# Patient Record
Sex: MALE | Race: WHITE | NOT HISPANIC OR LATINO | Employment: OTHER | URBAN - METROPOLITAN AREA
[De-identification: names, ages, dates, MRNs, and addresses within clinical notes are randomized per-mention and may not be internally consistent; named-entity substitution may affect disease eponyms.]

---

## 2020-10-17 ENCOUNTER — APPOINTMENT (OUTPATIENT)
Dept: URGENT CARE | Facility: PHYSICIAN GROUP | Age: 65
End: 2020-10-17
Payer: COMMERCIAL

## 2020-11-19 ENCOUNTER — HOSPITAL ENCOUNTER (OUTPATIENT)
Dept: LAB | Facility: MEDICAL CENTER | Age: 65
End: 2020-11-19
Attending: FAMILY MEDICINE
Payer: COMMERCIAL

## 2020-11-19 PROCEDURE — U0003 INFECTIOUS AGENT DETECTION BY NUCLEIC ACID (DNA OR RNA); SEVERE ACUTE RESPIRATORY SYNDROME CORONAVIRUS 2 (SARS-COV-2) (CORONAVIRUS DISEASE [COVID-19]), AMPLIFIED PROBE TECHNIQUE, MAKING USE OF HIGH THROUGHPUT TECHNOLOGIES AS DESCRIBED BY CMS-2020-01-R: HCPCS

## 2020-11-19 PROCEDURE — C9803 HOPD COVID-19 SPEC COLLECT: HCPCS

## 2020-11-20 LAB — COVID ORDER STATUS COVID19: NORMAL

## 2020-11-21 LAB
SARS-COV-2 RNA RESP QL NAA+PROBE: NOTDETECTED
SPECIMEN SOURCE: NORMAL

## 2020-12-17 ENCOUNTER — OFFICE VISIT (OUTPATIENT)
Dept: URGENT CARE | Facility: PHYSICIAN GROUP | Age: 65
End: 2020-12-17
Payer: COMMERCIAL

## 2020-12-17 VITALS
HEART RATE: 102 BPM | SYSTOLIC BLOOD PRESSURE: 174 MMHG | WEIGHT: 260 LBS | TEMPERATURE: 97.7 F | DIASTOLIC BLOOD PRESSURE: 100 MMHG | BODY MASS INDEX: 38.51 KG/M2 | OXYGEN SATURATION: 96 % | RESPIRATION RATE: 20 BRPM | HEIGHT: 69 IN

## 2020-12-17 DIAGNOSIS — M54.2 ANTERIOR NECK PAIN: ICD-10-CM

## 2020-12-17 DIAGNOSIS — J35.1 SWOLLEN TONSIL: ICD-10-CM

## 2020-12-17 DIAGNOSIS — R03.0 ELEVATED BLOOD PRESSURE READING: ICD-10-CM

## 2020-12-17 LAB
HETEROPH AB SER QL LA: NEGATIVE
INT CON NEG: NEGATIVE
INT CON NEG: NEGATIVE
INT CON POS: POSITIVE
INT CON POS: POSITIVE
S PYO AG THROAT QL: NEGATIVE

## 2020-12-17 PROCEDURE — 86308 HETEROPHILE ANTIBODY SCREEN: CPT | Performed by: FAMILY MEDICINE

## 2020-12-17 PROCEDURE — 99203 OFFICE O/P NEW LOW 30 MIN: CPT | Performed by: FAMILY MEDICINE

## 2020-12-17 PROCEDURE — 87880 STREP A ASSAY W/OPTIC: CPT | Performed by: FAMILY MEDICINE

## 2020-12-17 RX ORDER — MULTIVITAMIN
CAPSULE ORAL DAILY
COMMUNITY
End: 2023-06-21

## 2020-12-17 RX ORDER — VITAMIN B COMPLEX
CAPSULE ORAL DAILY
COMMUNITY

## 2020-12-18 NOTE — PROGRESS NOTES
"Subjective:      Nando Nelson is a 65 y.o. male who presents with Pharyngitis (swollen tonsils,blisters back of throat, x 4 weeks)            This is a new problem.  65 present for evaluation of blisters on the tonsils and intermittent swelling on the tonsils for the past 4 weeks but also having trouble especially at night as if he cannot breathe.  He feels his anterior right neck is swollen.  He denies any trouble swallowing per se.  He denies any trouble breathing at the present time.  No fever or chills reported.  Review of system otherwise negative.      Review of Systems   All other systems reviewed and are negative.         Objective:     BP (!) 174/100 (BP Location: Left arm, Patient Position: Sitting, BP Cuff Size: Large adult)   Pulse (!) 102   Temp 36.5 °C (97.7 °F) (Temporal)   Resp 20   Ht 1.753 m (5' 9\")   Wt 117.9 kg (260 lb)   SpO2 96%   BMI 38.40 kg/m²      Physical Exam  Constitutional:       General: He is not in acute distress.     Appearance: He is not ill-appearing, toxic-appearing or diaphoretic.   HENT:      Head: Normocephalic and atraumatic.        Comments: Some palpation of the right side of neck in the outlined area.  No erythema or swelling noted     Right Ear: Tympanic membrane, ear canal and external ear normal.      Left Ear: Tympanic membrane, ear canal and external ear normal.      Nose: No rhinorrhea.      Mouth/Throat:      Mouth: Mucous membranes are moist.      Pharynx: No pharyngeal swelling, oropharyngeal exudate, posterior oropharyngeal erythema or uvula swelling.        Comments: Some smaller blisters noted on the right tonsil  Eyes:      Conjunctiva/sclera: Conjunctivae normal.   Neck:      Musculoskeletal: Neck supple.   Cardiovascular:      Rate and Rhythm: Normal rate and regular rhythm.      Heart sounds: No murmur. No friction rub. No gallop.    Pulmonary:      Effort: Pulmonary effort is normal. No respiratory distress.      Breath sounds: No stridor. No wheezing " or rales.   Lymphadenopathy:      Cervical: No cervical adenopathy.   Skin:     General: Skin is warm.      Coloration: Skin is not jaundiced or pale.      Findings: No rash.   Neurological:      Mental Status: He is alert and oriented to person, place, and time.   Psychiatric:         Mood and Affect: Mood normal.            Negative mono and strep         Assessment/Plan:        1. Anterior neck pain    2. Elevated blood pressure reading    3. Sore throat  - POCT Rapid Strep A  - POCT Mononucleosis (mono)      Discussed in length that we cannot rule out any infectious process just by looking at the oropharynx and given the anterior neck pain, also intermittent trouble with breathing at night and patient feels the neck is swollen at times even if it is past few weeks he should have this looked at immediately in the ED setting where he can have his kidney function tested before IV contrast and imaging that needs to be done given his specially elevated blood pressure today which has been to some degree the case for months but not this high.  Plan per orders and instructions  Warning signs reviewed

## 2020-12-18 NOTE — PATIENT INSTRUCTIONS
With your history of trouble with the neck at times , one worry about a process in the neck soft tissue pressing on airways. Your blood pressure is also high and you need to have kidney function done before imaging with contrast that you need    Please to to nearest emergency department of your choice for evaluation

## 2021-03-03 DIAGNOSIS — Z23 NEED FOR VACCINATION: ICD-10-CM

## 2021-07-08 ENCOUNTER — SLEEP CENTER VISIT (OUTPATIENT)
Dept: SLEEP MEDICINE | Facility: MEDICAL CENTER | Age: 66
End: 2021-07-08
Payer: MEDICARE

## 2021-07-08 VITALS
SYSTOLIC BLOOD PRESSURE: 160 MMHG | WEIGHT: 260 LBS | DIASTOLIC BLOOD PRESSURE: 94 MMHG | HEIGHT: 69 IN | RESPIRATION RATE: 16 BRPM | OXYGEN SATURATION: 98 % | BODY MASS INDEX: 38.51 KG/M2 | HEART RATE: 89 BPM

## 2021-07-08 DIAGNOSIS — G47.33 OSA (OBSTRUCTIVE SLEEP APNEA): ICD-10-CM

## 2021-07-08 DIAGNOSIS — I10 HYPERTENSION, UNSPECIFIED TYPE: ICD-10-CM

## 2021-07-08 PROCEDURE — 99203 OFFICE O/P NEW LOW 30 MIN: CPT | Performed by: PREVENTIVE MEDICINE

## 2021-07-08 RX ORDER — AZELASTINE HCL 205.5 UG/1
2 SPRAY NASAL
COMMUNITY
Start: 2021-05-10

## 2021-07-08 RX ORDER — ZOLPIDEM TARTRATE 5 MG/1
5 TABLET ORAL NIGHTLY PRN
Qty: 2 TABLET | Refills: 0 | Status: SHIPPED | OUTPATIENT
Start: 2021-07-08 | End: 2021-12-16

## 2021-07-08 NOTE — PROGRESS NOTES
"  CC: \" I need a new Pap machine.\"    HPI:  Nando Nelson is a 66 y.o. male kindly referred by Qasim BRUSH M.D..  This patient has been using Pap therapy for multiple years.  His machine is well over 5 years old and he is interested in getting a new machine that actually has a humidifier..  He is not using his PAP therapy at this time because it is irritating to his nasal passages and sinuses in this dry climate.  He is experiencing allergic rhinitis due to pollen and he also has a history of essential hypertension and GERD and SOB..        Symptom Summary:  Snoring: +  Very loud snoring: +  Witnessed apneas: +  Resuscitative snorts: +  Nocturnal shortness of breath: +  Non-restorative sleep: +  EPWORTH SCORE:    11 /24, this is consistent with excessive daytime sleepiness  Insomnia: +  Nocturnal awakenings: +  Nocturia: +  Dyspnea-ASV okay including APAP and ASV and ASV is used for patients that have central sleep apnea it has a whole different he has measured system match normal breaths and APAP is just a CPAP of 2 pressures and your body can admits to feeling that the mean of 88-year you are lying will basically like the machine will have much air within that range is necessary to keep the airway open so gets done it for obstructive sleep apnea pain that help get fatigue: +  Tiredness: +  Falls asleep accidentally: +   Napping or returning to bed after arising: +  Restless legs: -  Limb movements during sleep: -  Nocturnal headaches: -  Morning Headaches: -    Significant comorbidities and modifying factors include  GERD, HTN, hx of Bladder cancer      There are no problems to display for this patient.      Past Medical History:   Diagnosis Date   • Apnea, sleep     sometimes   • Daytime sleepiness    • Gasping for breath     sometimes   • Snoring     Occ        History reviewed. No pertinent surgical history.    Family History   Problem Relation Age of Onset   • Sleep Apnea Sister        Social History "     Socioeconomic History   • Marital status:      Spouse name: Not on file   • Number of children: Not on file   • Years of education: Not on file   • Highest education level: Not on file   Occupational History   • Not on file   Tobacco Use   • Smoking status: Never Smoker   • Smokeless tobacco: Never Used   Vaping Use   • Vaping Use: Never used   Substance and Sexual Activity   • Alcohol use: Yes     Comment: occ    • Drug use: Yes     Types: Marijuana     Comment: occ    • Sexual activity: Not on file   Other Topics Concern   • Not on file   Social History Narrative   • Not on file     Social Determinants of Health     Financial Resource Strain:    • Difficulty of Paying Living Expenses:    Food Insecurity:    • Worried About Running Out of Food in the Last Year:    • Ran Out of Food in the Last Year:    Transportation Needs:    • Lack of Transportation (Medical):    • Lack of Transportation (Non-Medical):    Physical Activity:    • Days of Exercise per Week:    • Minutes of Exercise per Session:    Stress:    • Feeling of Stress :    Social Connections:    • Frequency of Communication with Friends and Family:    • Frequency of Social Gatherings with Friends and Family:    • Attends Taoism Services:    • Active Member of Clubs or Organizations:    • Attends Club or Organization Meetings:    • Marital Status:    Intimate Partner Violence:    • Fear of Current or Ex-Partner:    • Emotionally Abused:    • Physically Abused:    • Sexually Abused:            ALLERGIES: Patient has no known allergies.    ROS    Constitutional: Denies weight loss, endorses fatigue.  Ears/Nose/Mouth/Throat: Endorses rhinitis/nasal congestion,  Cardiovascular: Denies chest pain, tightness, palpitations, swelling in legs/feet, difficulty breathing when lying down but gets better when sitting up.   Respiratory: Endorses shortness of breath while awake,  Sleep: per HPI  Gastrointestinal: Endorses heartburn.  Genitourinary: REPORTS  "nocturia  Musculoskeletal: Endorses back pain.   Neurological: Denies frequent headaches,weakness, dizziness.    PHYSICAL EXAM    Neck circumference:19.5  in  /94 (BP Location: Left arm, Patient Position: Sitting, BP Cuff Size: Large adult)   Pulse 89   Resp 16   Ht 1.753 m (5' 9\")   Wt 118 kg (260 lb)   SpO2 98%   BMI 38.40 kg/m²   Appearance: Well-nourished, well-developed,  looks stated age, no acute distress  Eyes:   EOMI  ENMT: MASKED  Neck: Supple, trachea midline  Respiratory effort:  No intercostal retractions or use of accessory muscles  Musculoskeletal:  Grossly normal; gait and station normal  Neurologic:  oriented to person, time, place, and purpose; judgement intact  Psychiatric:  No depression, anxiety, agitation        Medical Decision Making:  The medical record was reviewed in its as pertains to this referral. This includes records from primary care, consultants notes,  referral request, hospital records, labs, imaging.    Any available diagnostic and titration nocturnal polysomnograms, home sleep apnea tests, continuous nocturnal oximetry results, multiple sleep latency tests, and compliance reports were reviewed with the patient.    Assessment:    1. Hypertension, unspecified type  - Polysomnography, 4 or More; Future    2. RICHARD (obstructive sleep apnea)  - Polysomnography, 4 or More; Future  - zolpidem (AMBIEN) 5 MG Tab; Take 1 tablet by mouth at bedtime as needed for Sleep for up to 2 doses. Use only on the night of your sleep study.  Dispense: 2 tablet; Refill: 0    Other orders  - Azelastine HCl 0.15 % Solution; Administer 2 Sprays into each nostril.  - Fexofenadine HCl (ALLEGRA ALLERGY PO); Take  by mouth.  - Pseudoephedrine HCl (SUDAFED PO); Take  by mouth.      PLAN:   This patient was diagnosed with obstructive sleep apnea several years ago.  His machine is older and he does not have a humidification chamber.  We will schedule a nocturnal polysomnogram (split night).  Split at " greater than 5/h x 2 hours.  Patient will use Ambien.      The risks of untreated sleep apnea were discussed with the patient at length. Patients with RICHARD are at increased risk of cardiovascular disease including coronary artery disease, systemic arterial hypertension, pulmonary arterial hypertension, cardiac arrhythmias, and stroke. RICHARD patients have an increased risk of motor vehicle accidents, type 2 diabetes, chronic kidney disease, and non-alcoholic liver disease. The patient was advised to avoid driving a motor vehicle when drowsy.        Have advised the patient to follow up with the appropriate healthcare practitioners for all other medical problems and issues.              Return for PSG results MUST BE 5 week days after study, Discuss results of sleep study.

## 2021-07-12 ENCOUNTER — PATIENT MESSAGE (OUTPATIENT)
Dept: SLEEP MEDICINE | Facility: MEDICAL CENTER | Age: 66
End: 2021-07-12

## 2021-07-13 NOTE — TELEPHONE ENCOUNTER
From: Nando Nelson  To: Physician Magnolia Downey  Sent: 7/12/2021 11:18 AM PDT  Subject: Prescription Question    Hi Dr. Downye,  My pharmacy has not yet received the prescription for the sleep medication that I am to take for my sleep study on July 25th. Should I be concerned? or will that be sent to the pharmacy just prior to the 25th?  Thank you,  Nando Nelson

## 2021-07-25 ENCOUNTER — SLEEP STUDY (OUTPATIENT)
Dept: SLEEP MEDICINE | Facility: MEDICAL CENTER | Age: 66
End: 2021-07-25
Attending: PREVENTIVE MEDICINE
Payer: MEDICARE

## 2021-07-25 DIAGNOSIS — I10 HYPERTENSION, UNSPECIFIED TYPE: ICD-10-CM

## 2021-07-25 DIAGNOSIS — G47.33 OSA (OBSTRUCTIVE SLEEP APNEA): ICD-10-CM

## 2021-07-27 NOTE — PROCEDURES
RECORDING TECHNIQUE:   After the scalp was prepared, gold plated electrodes were applied to the scalp according to the International 10-20 System. EEG (electroencephalogram) was continuously monitored from the O1-M2, O2-M1, C3-M2, C4-M1, F3-M2, and F4-M1. EOGs (electrooculograms) were monitored by electrodes placed at the left and right outer canthi.  Chin EMG (electromyogram) was monitored by electrodes placed on the mentalis and sub-mentalis muscles.  Nasal and oral airflow were monitored using a triple port thermocouple as well as oronasal pressure transducer.  Respiratory effort was measured by inductive plethysmography technology employing abdominal and thoracic belts.  Blood oxygen saturation and pulse were monitored by pulse oximetry.  Heart rhythm was monitored by surface electrocardiogram.  Leg EMG was studied using surface electrodes placed on left and right anterior tibialis.  A microphone was used to monitor tracheal sounds and snoring.  Body position was monitored and documented by technician observation.    MONTAGE: Standard    STUDY TYPE: Split-night titration    SCORING CRITERIA:    A modification of the AASM manual for scoring of sleep and associated events was used.  Obstructive apneas were scored by cessation of airflow for at least 10 seconds with continuing respiratory effort.  Central apneas  were  scored by cessation of airflow for at least 10 seconds with no respiratory effort.  Hypopneas were scored by a 30% or more reduction in airflow for at least 10 seconds accompanied by arterial oxygen desaturation of 3%  or an arousal.  CMS, for Medicare patients , hypopneas are scored by 30% with mild reduction in airflow for at least 10 seconds accompanied by arterial saturation decreased at 4%.       Study data:    DIAGNOSTIC:  Study start time was 10:55:59 PM. Total recording time was 3h 0.0m (180 minutes) with a total sleep time of 2h 41.5m (161 minutes) resulting in a sleep efficiency of  89.72%.  Sleep latency from the start of the study was 06 minutes and REM latency from sleep onset was 61 minutes.  Respiratory:   There were 41 apneas in total consisting of 40 obstructive apneas, 0 mixed apneas, and 1 central apneas. There were 153 hypopneas in total.  The apnea index was 15.23 per hour and the hypopnea index was 56.84 per hour.  The overall AHI was 72.1, with a REM AHI of 82.50, and a supine AHI of 123.03.  Limb Movements:  There were a total of 81 periodic leg movements, of which 0 were PLMS arousals. This resulted in a PLMS index of 30.1 and a PLMS arousal index of 0.0  Oximetry:  The mean SaO2 was 87.0% for the diagnostic portion of the study, with a minimum SaO2 of 68.0%..  This patient had 139 minutes with oxygen saturation under 88 percent.    TREATMENT:    Treatment recording time was 4h 24.0m (264 minutes) with a total sleep time of 3h 49.0m (229 minutes) resulting in a sleep efficiency of 86.7%.   Sleep latency from the start of treatment was 14 minutes and REM latency from sleep onset was 1h 12.0m minutes.   The patient had 91 arousals in total for an arousal index of 23.8.  Respiratory:   There were 48 apneas in total consisting of 23 obstructive apneas, 25 central apneas, and 0 mixed apneas for an apnea index of 12.58.   The patient had 96 hypopneas in total, which resulted in a hypopnea index of 25.15.   The overall AHI was 37.73, with a REM AHI of 20.66, and a supine AHI of 47.93.   Limb Movements:  There were a total of 74 periodic leg movements, of which 0 were PLMS arousals. This resulted in a PLMS index of 19.4 and a PLMS arousal index of 0.0.  Oximetry:  The mean SaO2 during treatment was 90.0%, with a minimum oxygen saturation of 78.0%..  This patient spent 105.5 minutes under 89 percent oxygen saturation      TITRATION:CPAP was tried from 6cm H2O to 15 cm H2O. Bipap was tried from 16/12 cm H2O to 18/14 cm H2O.         This was a fully attended sleep study .   This test was  technically adequate.  CPAP was tried from 6cm H2O to 15 cm H2O. Bipap was tried from 16/12 cm H2O to 18/14 cm H2O. Although it appears that he did  best on 18/14 cmH2O with an AHI of 4.44 as the last pressure of the night-- he had been tries on this exact pressure just 30 mins earlier and he had an AHI of 86.67.  This titration was not  adequate.       ASSESSMENT:     DIAGNOSTIC :  Severe  obstructive sleep apnea hypopnea - AHI 72.1  Severe Nocturnal desaturation - jinny saturation 68% - saturations <89% below for 139 minutes of TST.    TREATMENT :  Severe obstructive sleep apnea hypopnea - AHI 37.73  Severe Nocturnal desaturation - jinny saturation 78.0% - saturations <89% below for 105.5 minutes of TST.        RECOMMENDATION:      This patient had a split night study on July 25, 2021.  The patient failed CPAP and then BiPAP was tried.  However the BiPAP titration was not adequate to identify the proper pressures for treatment.  It was during the BiPAP titration that he developed treatment emergent centrals.  This patient will be returned to the lab for a full night titration that will include BiPAP and possibly ASV.  This patient also had persistent severe nocturnal desaturation splint may indeed need oxygen supplementation unless ideal pressures of modality can be identified to treat his sleep apnea.                 HYPNOGRAM:

## 2021-08-13 ENCOUNTER — SLEEP CENTER VISIT (OUTPATIENT)
Dept: SLEEP MEDICINE | Facility: MEDICAL CENTER | Age: 66
End: 2021-08-13
Payer: MEDICARE

## 2021-08-13 VITALS
BODY MASS INDEX: 39.1 KG/M2 | RESPIRATION RATE: 16 BRPM | HEIGHT: 69 IN | OXYGEN SATURATION: 97 % | DIASTOLIC BLOOD PRESSURE: 80 MMHG | WEIGHT: 264 LBS | HEART RATE: 97 BPM | SYSTOLIC BLOOD PRESSURE: 150 MMHG

## 2021-08-13 DIAGNOSIS — G47.39 TREATMENT-EMERGENT CENTRAL SLEEP APNEA: ICD-10-CM

## 2021-08-13 DIAGNOSIS — G47.33 OSA (OBSTRUCTIVE SLEEP APNEA): ICD-10-CM

## 2021-08-13 DIAGNOSIS — G47.34 NOCTURNAL OXYGEN DESATURATION: ICD-10-CM

## 2021-08-13 PROCEDURE — 95811 POLYSOM 6/>YRS CPAP 4/> PARM: CPT | Performed by: PREVENTIVE MEDICINE

## 2021-08-13 PROCEDURE — 99213 OFFICE O/P EST LOW 20 MIN: CPT | Performed by: PREVENTIVE MEDICINE

## 2021-08-13 NOTE — PROGRESS NOTES
"CC: \" The sleep study was OK.\"        HPI:  Nando Nelson is a 66 y.o.male  kindly referred by Qasim BRUSH M.D. This patient has been using Pap therapy for multiple years.  His machine is well over 5 years old and he is interested in getting a new machine that actually has a humidifier..  He is not using his PAP therapy at this time because it is irritating to his nasal passages and sinuses in this dry climate.  He is experiencing allergic rhinitis due to pollen and he also has a history of essential hypertension and GERD and SOB..    SLEEP STUDY RESULTS:    This was a fully attended sleep study .   This test was technically adequate.  CPAP was tried from 6cm H2O to 15 cm H2O. Bipap was tried from 16/12 cm H2O to 18/14 cm H2O. Although it appears that he did  best on 18/14 cmH2O with an AHI of 4.44 as the last pressure of the night-- he had been tries on this exact pressure just 30 mins earlier and he had an AHI of 86.67.  This titration was not  adequate.       ASSESSMENT:     DIAGNOSTIC :  Severe  obstructive sleep apnea hypopnea - AHI 72.1  Severe Nocturnal desaturation - jinny saturation 68% - saturations <89% below for 139 minutes of TST.     TREATMENT :  Severe obstructive sleep apnea hypopnea - AHI 37.73  Severe Nocturnal desaturation - jinny saturation 78.0% - saturations <89% below for 105.5 minutes of TST.        There are no problems to display for this patient.      Past Medical History:   Diagnosis Date   • Apnea, sleep     sometimes   • Daytime sleepiness    • Gasping for breath     sometimes   • Snoring     Occ        History reviewed. No pertinent surgical history.    Family History   Problem Relation Age of Onset   • Sleep Apnea Sister        Social History     Socioeconomic History   • Marital status:      Spouse name: Not on file   • Number of children: Not on file   • Years of education: Not on file   • Highest education level: Not on file   Occupational History   • Not on file " "  Tobacco Use   • Smoking status: Never Smoker   • Smokeless tobacco: Never Used   Vaping Use   • Vaping Use: Never used   Substance and Sexual Activity   • Alcohol use: Yes     Comment: occ    • Drug use: Yes     Types: Marijuana     Comment: occ    • Sexual activity: Not on file   Other Topics Concern   • Not on file   Social History Narrative   • Not on file     Social Determinants of Health     Financial Resource Strain:    • Difficulty of Paying Living Expenses:    Food Insecurity:    • Worried About Running Out of Food in the Last Year:    • Ran Out of Food in the Last Year:    Transportation Needs:    • Lack of Transportation (Medical):    • Lack of Transportation (Non-Medical):    Physical Activity:    • Days of Exercise per Week:    • Minutes of Exercise per Session:    Stress:    • Feeling of Stress :    Social Connections:    • Frequency of Communication with Friends and Family:    • Frequency of Social Gatherings with Friends and Family:    • Attends Congregational Services:    • Active Member of Clubs or Organizations:    • Attends Club or Organization Meetings:    • Marital Status:    Intimate Partner Violence:    • Fear of Current or Ex-Partner:    • Emotionally Abused:    • Physically Abused:    • Sexually Abused:        Current Outpatient Medications   Medication Sig Dispense Refill   • Azelastine HCl 0.15 % Solution Administer 2 Sprays into each nostril.     • Fexofenadine HCl (ALLEGRA ALLERGY PO) Take  by mouth.     • Pseudoephedrine HCl (SUDAFED PO) Take  by mouth.     • Multiple Vitamin (MULTIVITAMIN) capsule Take  by mouth.     • B Complex Cap Take  by mouth.     • zolpidem (AMBIEN) 5 MG Tab Take 1 tablet by mouth at bedtime as needed for Sleep for up to 2 doses. Use only on the night of your sleep study. (Patient not taking: Reported on 8/13/2021) 2 tablet 0     No current facility-administered medications for this visit.    \"CURRENT RX\"    ALLERGIES: Patient has no known " "allergies.    ROS    Constitutional: Denies fever, chills, sweats,  weight loss, fatigue  Cardiovascular: Denies chest pain, tightness, palpitations, swelling in legs/feet, fainting, difficulty breathing when lying down but gets better when sitting up.   Respiratory: Denies shortness of breath, cough, sputum, wheezing, painful breathing, coughing up blood.   Sleep: per HPI  Gastrointestinal: Denies  difficulty swallowing, nausea, abdominal pain, diarrhea, constipation, heartburn.  Musculoskeletal: Denies painful joints, sore muscles, back pain.        PHYSICAL EXAM      /80 (BP Location: Left arm, Patient Position: Sitting, BP Cuff Size: Large adult)   Pulse 97   Resp 16   Ht 1.753 m (5' 9\")   Wt 120 kg (264 lb)   SpO2 97%   BMI 38.99 kg/m²   Appearance: Well-nourished, looks stated age no acute distress  Eyes:  , EOMI  ENMT: masked  Neck: , trachea midline, no masses  Respiratory effort:  No intercostal retractions or use of accessory muscles  Musculoskeletal:  Grossly normal; gait and station normal; digits and nails normal  Neurologic: oriented to person, time, place, and purpose; judgement intact  Psychiatric:  No depression, anxiety, agitation      Medical Decision Making       The medical record was reviewed in its entirety including the referral notes, records from primary care, consultants notes, hospital records, lab, imaging, microbiology, immunology, and immunizations.  Care gaps identified and reviewed with the patient.    Diagnostic and titration nocturnal polysomnogram's, home sleep apnea tests, continuous nocturnal oximetry results, multiple sleep latency tests, and compliance reports reviewed.    ASSESSMENT:    1. RICHARD (obstructive sleep apnea)  - Polysomnography Titration; Future    2. Nocturnal oxygen desaturation  - Polysomnography Titration; Future    3. Treatment-emergent central sleep apnea  - Polysomnography Titration; Future      PLAN:/ASSESSMENT:     DIAGNOSTIC :  Severe  " obstructive sleep apnea hypopnea - AHI 72.1  Severe Nocturnal desaturation - jinny saturation 68% - saturations <89% below for 139 minutes of TST.     TREATMENT :  Severe obstructive sleep apnea hypopnea - AHI 37.73  Severe Nocturnal desaturation - jinny saturation 78.0% - saturations <89% below for 105.5 minutes of TST.        RECOMMENDATION:      This patient had a split night study on July 25, 2021.  The patient failed CPAP and then BiPAP was tried.  However the BiPAP titration was not adequate to identify the proper pressures for treatment.  It was during the BiPAP titration that he developed treatment emergent centrals.  This patient will lreturn to the lab for a full night titration that will include BiPAP and possibly ASV.  This patient also had persistent severe nocturnal desaturations and  may indeed need oxygen supplementation unless ideal pressures of the proper modality can be identified to treat his sleep apnea.      The risks of untreated sleep apnea were discussed with the patient at length. Patients with RICHARD are at increased risk of cardiovascular disease including coronary artery disease, systemic arterial hypertension, pulmonary arterial hypertension, cardiac arrythmias, and stroke. RICHARD patients have an increased risk of motor vehicle accidents, type 2 diabetes, chronic kidney disease, and non-alcoholic liver disease. The patient was advised to avoid driving a motor vehicle when drowsy.    RTC:

## 2021-08-30 ENCOUNTER — HOSPITAL ENCOUNTER (OUTPATIENT)
Facility: MEDICAL CENTER | Age: 66
End: 2021-08-30
Attending: PHYSICIAN ASSISTANT
Payer: MEDICARE

## 2021-08-30 PROCEDURE — 87077 CULTURE AEROBIC IDENTIFY: CPT

## 2021-08-30 PROCEDURE — 87086 URINE CULTURE/COLONY COUNT: CPT

## 2021-08-30 PROCEDURE — 87186 SC STD MICRODIL/AGAR DIL: CPT

## 2021-09-02 LAB
BACTERIA UR CULT: ABNORMAL
BACTERIA UR CULT: ABNORMAL
SIGNIFICANT IND 70042: ABNORMAL
SITE SITE: ABNORMAL
SOURCE SOURCE: ABNORMAL

## 2021-09-08 ENCOUNTER — SLEEP STUDY (OUTPATIENT)
Dept: SLEEP MEDICINE | Facility: MEDICAL CENTER | Age: 66
End: 2021-09-08
Attending: PREVENTIVE MEDICINE
Payer: MEDICARE

## 2021-09-08 DIAGNOSIS — G47.34 NOCTURNAL OXYGEN DESATURATION: ICD-10-CM

## 2021-09-08 DIAGNOSIS — G47.33 OSA (OBSTRUCTIVE SLEEP APNEA): ICD-10-CM

## 2021-09-08 DIAGNOSIS — G47.39 TREATMENT-EMERGENT CENTRAL SLEEP APNEA: ICD-10-CM

## 2021-09-24 NOTE — PROCEDURES
MONTAGE: Standard    STUDY TYPE: Titration    RECORDING TECHNIQUE:   After the scalp was prepared, gold plated electrodes were applied to the scalp according to the International 10-20 System. EEG (electroencephalogram) was continuously monitored from the O1-M2, O2-M1, C3-M2, C4-M1, F3-M2, and F4-M1. EOGs (electrooculograms) were monitored by electrodes placed at the left and right outer canthi. Chin EMG (electromyogram) was monitored by electrodes placed on the mentalis and sub-mentalis muscles. Nasal and oral airflow were monitored using a triple port thermocouple as well as oronasal pressure transducer. Respiratory effort was measured by inductive plethysmography technology employing abdominal and thoracic belts. Blood oxygen saturation and pulse were monitored by pulse oximetry. Heart rhythm was monitored by surface electrocardiogram. Leg EMG was studied using surface electrodes placed on left and right anterior tibialis. A microphone was used to monitor tracheal sounds and snoring. Body position was monitored and documented by technician observation.   SCORING CRITERIA:   A modification of the AASM manual for scoring of sleep and associated events was used. Obstructive apneas were scored by cessation of airflow for at least 10 seconds with continuing respiratory effort. Central apneas were scored by cessation of airflow for at least 10 seconds with no respiratory effort. Hypopneas were scored by a 30% or more reduction in airflow for at least 10 seconds accompanied by arterial oxygen desaturation of 3% or an arousal. For CMS (Medicare) patients, per AASM rule 1B, hypopneas are scored by 30% with mild reduction in airflow for at least 10 seconds accompanied by arterial saturation decreased at 4%.      Study start time was 10:55:22 PM. Diagnostic recording time was 6h 36.0m with a total sleep time of 5h 23.5m resulting in a sleep efficiency of 81.69%%. Sleep latency from the start of the study was 05 minutes  and the latency from sleep to REM was 137 minutes. In total,74 arousals were scored for an arousal index of 13.7.    Respiratory:  There were a total of 6 apneas consisting of 1 obstructive apneas, 0 mixed apneas, and 5 central apneas. A total of 29 hypopneas were scored. The apnea index was 1.11 per hour and the hypopnea index was 5.38 per hour resulting in an overall AHI of 6.49. AHI during REM was 8.7 and AHI while supine was 6.46.    Oximetry:  There was a mean oxygen saturation of 91.0% with a minimum oxygen saturation of 78.0%. The patient spent 68.8 minutes of TST with SaO2 <88%.    Cardiac:  The highest heart rate seen while awake was 86 BPM while the highest heart rate during sleep was 83 BPM with an average sleeping heart rate of 71 BPM.    Limb Movements: Data are unreliable.    Titration: Starting at 10/6 cmH2O patient was titrated further up to 18/14 cmH2O    This was a fully attended sleep study. This test was technically adequate. This patient was titrated on BiPAP starting at 10/6 cm of water pressure. Patient was titrated up to 18/14 cm of water pressure. Patient did best at 17/13 cm of water pressure. Patient spent 85 minutes at that pressure and their AHI was 0.8 which is considered normal.    Assessment:     Mild obstructive Sleep Apnea Hypopnea - AHI 6.49.  Moderate to severe nocturnal desaturation - jinny saturation 78.0% - saturations <88% below for 68.8 minutes of TST.    Recommendation:     It is recommended this patient be provided a ResMed air curve 10 the auto BiPAP machine.  With heated humidification.  It will be set in spontaneous mode with IPAP set at 17 cmH2O and EPAP set at 13 cmH2O.  If the patient needs more range this Pap machine can be set as an auto BiPAP.  Oxygen supplementation at 2 L/min will also be ordered.  In the future once this patient is stable and compliant on his Pap therapy a repeat OPO. can be done using his PAP therapy only to determine whether or not his  supplemental oxygen is truly needed.        HYPNOGRAM:

## 2021-10-05 PROCEDURE — 95811 POLYSOM 6/>YRS CPAP 4/> PARM: CPT | Performed by: PREVENTIVE MEDICINE

## 2021-10-06 ENCOUNTER — OFFICE VISIT (OUTPATIENT)
Dept: SLEEP MEDICINE | Facility: MEDICAL CENTER | Age: 66
End: 2021-10-06
Payer: MEDICARE

## 2021-10-06 VITALS
DIASTOLIC BLOOD PRESSURE: 86 MMHG | HEART RATE: 97 BPM | SYSTOLIC BLOOD PRESSURE: 150 MMHG | WEIGHT: 260 LBS | BODY MASS INDEX: 38.51 KG/M2 | RESPIRATION RATE: 16 BRPM | OXYGEN SATURATION: 95 % | HEIGHT: 69 IN

## 2021-10-06 DIAGNOSIS — G47.34 NOCTURNAL OXYGEN DESATURATION: ICD-10-CM

## 2021-10-06 DIAGNOSIS — G47.33 OSA TREATED WITH BIPAP: ICD-10-CM

## 2021-10-06 PROCEDURE — 99214 OFFICE O/P EST MOD 30 MIN: CPT | Performed by: PREVENTIVE MEDICINE

## 2021-10-06 NOTE — PROGRESS NOTES
"CC: \" How did I do on my sleep test?\"        HPI:  Nando Nelson is a 66 y.o.male  kindly referred by Qasim BRUSH M.D. This patient has been using Pap therapy for multiple years.  His machine is well over 5 years old and he is interested in getting a new machine that actually has a humidifier..  He is not using his PAP therapy at this time because it is irritating to his nasal passages and sinuses in this dry climate.  He is experiencing allergic rhinitis due to pollen and he also has a history of essential hypertension and GERD and SOB.  He was last seen in this clinic by this provider on August 13, 2021.  At that time his first sleep study results were discussed see below:     SLEEP STUDY RESULTS:  7/25/2021     This was a fully attended sleep study .   This test was technically adequate.  CPAP was tried from 6cm H2O to 15 cm H2O. Bipap was tried from 16/12 cm H2O to 18/14 cm H2O. Although it appears that he did  best on 18/14 cmH2O with an AHI of 4.44 as the last pressure of the night-- he had been tries on this exact pressure just 30 mins earlier and he had an AHI of 86.67.  This titration was not  adequate.       ASSESSMENT:     DIAGNOSTIC :  Severe  obstructive sleep apnea hypopnea - AHI 72.1  Severe Nocturnal desaturation - jinny saturation 68% - saturations <89% below for 139 minutes of TST.     TREATMENT :  Severe obstructive sleep apnea hypopnea - AHI 37.73  Severe Nocturnal desaturation - jinny saturation 78.0% - saturations <89% below for 105.5 minutes of TST.  This was an inadequate titration.    Patient was then referred to a full night titration with BiPAP.  He underwent that study on September 8, 2021.  See results below    Sleep study results:  TYPE: Full night titration with BiPAP  DATE: September 8, 2021  TREATMENT AHI: 6.49  TREATMENT Oxygen Jinny: 78.0% and patient spent 68.8 minutes under an SaO2 of 88%  TITRATION: Patient did best at an IPAP of 17 and EPAP of 13 cm water pressure.  He was " that pressure for well over an hour and his AHI was 0.8.          There are no problems to display for this patient.      Past Medical History:   Diagnosis Date   • Apnea, sleep     sometimes   • Daytime sleepiness    • Gasping for breath     sometimes   • Snoring     Occ        History reviewed. No pertinent surgical history.    Family History   Problem Relation Age of Onset   • Sleep Apnea Sister        Social History     Socioeconomic History   • Marital status:      Spouse name: Not on file   • Number of children: Not on file   • Years of education: Not on file   • Highest education level: Not on file   Occupational History   • Not on file   Tobacco Use   • Smoking status: Never Smoker   • Smokeless tobacco: Never Used   Vaping Use   • Vaping Use: Never used   Substance and Sexual Activity   • Alcohol use: Yes     Comment: occ    • Drug use: Yes     Types: Marijuana     Comment: occ    • Sexual activity: Not on file   Other Topics Concern   • Not on file   Social History Narrative   • Not on file     Social Determinants of Health     Financial Resource Strain:    • Difficulty of Paying Living Expenses:    Food Insecurity:    • Worried About Running Out of Food in the Last Year:    • Ran Out of Food in the Last Year:    Transportation Needs:    • Lack of Transportation (Medical):    • Lack of Transportation (Non-Medical):    Physical Activity:    • Days of Exercise per Week:    • Minutes of Exercise per Session:    Stress:    • Feeling of Stress :    Social Connections:    • Frequency of Communication with Friends and Family:    • Frequency of Social Gatherings with Friends and Family:    • Attends Sabianism Services:    • Active Member of Clubs or Organizations:    • Attends Club or Organization Meetings:    • Marital Status:    Intimate Partner Violence:    • Fear of Current or Ex-Partner:    • Emotionally Abused:    • Physically Abused:    • Sexually Abused:        Current Outpatient Medications  "  Medication Sig Dispense Refill   • Azelastine HCl 0.15 % Solution Administer 2 Sprays into each nostril.     • Fexofenadine HCl (ALLEGRA ALLERGY PO) Take  by mouth.     • Pseudoephedrine HCl (SUDAFED PO) Take  by mouth.     • Multiple Vitamin (MULTIVITAMIN) capsule Take  by mouth.     • B Complex Cap Take  by mouth.     • zolpidem (AMBIEN) 5 MG Tab Take 1 tablet by mouth at bedtime as needed for Sleep for up to 2 doses. Use only on the night of your sleep study. (Patient not taking: Reported on 8/13/2021) 2 tablet 0     No current facility-administered medications for this visit.    \"CURRENT RX\"    ALLERGIES: Patient has no known allergies.    ROS    Constitutional: Denies fever, chills, sweats,  weight loss, fatigue  Cardiovascular: Denies chest pain, tightness, palpitations, swelling in legs/feet, fainting, difficulty breathing when lying down but gets better when sitting up.   Respiratory: Denies shortness of breath, cough, sputum, wheezing, painful breathing, coughing up blood.   Sleep: per HPI  Gastrointestinal: Denies  difficulty swallowing, nausea, abdominal pain, diarrhea, constipation, heartburn.  Musculoskeletal: Denies painful joints, sore muscles, back pain.        PHYSICAL EXAM      /86 (BP Location: Left arm, Patient Position: Sitting, BP Cuff Size: Large adult)   Pulse 97   Resp 16   Ht 1.753 m (5' 9\")   Wt 118 kg (260 lb)   SpO2 95%   BMI 38.40 kg/m²   Appearance: Well-nourished, looks stated age no acute distress  Eyes:  PERRLA, EOMI  ENMT: masked  Neck: trachea midline, no masses  Respiratory effort:  No intercostal retractions or use of accessory muscles  Musculoskeletal:  Grossly normal; gait and station normal; digits and nails normal  Neurologic: without focal signs; oriented to person, time, place, and purpose; judgement intact  Psychiatric:  No depression, anxiety, agitation      Medical Decision Making       The medical record was reviewed in its entirety including the referral " notes, records from primary care, consultants notes, hospital records, lab, imaging, microbiology, immunology, and immunizations.  Care gaps identified and reviewed with the patient.    Diagnostic and titration nocturnal polysomnogram's, home sleep apnea tests, continuous nocturnal oximetry results, multiple sleep latency tests, and compliance reports reviewed.    ASSESSMENT:    1. RICHARD treated with BiPAP  - DME BiPAP    2. Nocturnal oxygen desaturation  - DME BiPAP      PLAN:     Based on his most recent titration study following PAP therapy will be ordered:  ResMed air curve 10 the auto BiPAP machine with heated humidification.  Will be set in spontaneous mode with IPAP set at 17 cmH2O and EPAP set at 13 cmH2O.  If the patient needs more range this BiPAP machine can be set using pressure support.  Additionally oxygen supplementation at 2 L/min will be ordered as well.  In the future, once patient is stable and compliant on his Pap therapy a repeat OPO. will be done using his PAP therapy only.  This will be done to determine whether or not he continues to need his supplemental oxygen.      The risks of untreated sleep apnea were discussed with the patient at length. Patients with RICHARD are at increased risk of cardiovascular disease including coronary artery disease, systemic arterial hypertension, pulmonary arterial hypertension, cardiac arrythmias, and stroke. RICHARD patients have an increased risk of motor vehicle accidents, type 2 diabetes, chronic kidney disease, and non-alcoholic liver disease. The patient was advised to avoid driving a motor vehicle when drowsy.    RTC:    8 weeks for 1st compliance estiven

## 2021-12-14 ENCOUNTER — OFFICE VISIT (OUTPATIENT)
Dept: SLEEP MEDICINE | Facility: MEDICAL CENTER | Age: 66
End: 2021-12-14
Payer: MEDICARE

## 2021-12-14 VITALS
BODY MASS INDEX: 39.1 KG/M2 | HEART RATE: 95 BPM | WEIGHT: 264 LBS | RESPIRATION RATE: 16 BRPM | OXYGEN SATURATION: 96 % | HEIGHT: 69 IN | DIASTOLIC BLOOD PRESSURE: 90 MMHG | SYSTOLIC BLOOD PRESSURE: 160 MMHG

## 2021-12-14 DIAGNOSIS — G47.33 OSA (OBSTRUCTIVE SLEEP APNEA): ICD-10-CM

## 2021-12-14 DIAGNOSIS — G47.34 NOCTURNAL OXYGEN DESATURATION: ICD-10-CM

## 2021-12-14 PROCEDURE — 99214 OFFICE O/P EST MOD 30 MIN: CPT | Performed by: PREVENTIVE MEDICINE

## 2021-12-14 NOTE — PROGRESS NOTES
"CC: \" Overall it is going well but once in a while I get a pressure coming through the mask just feels too high.\"        HPI:  Nando Nelson is a 66 y.o.male  kindly referred by Qasim BRUSH M.D. Patient reports that he can see the benefit of PAP therapy..  He reports that he is sleeping more deeply and is less tired during the day.    COMPLIANCE DATA:  Machine type: ResMed air curve 10 V auto  Date range: 11/14/2021 through 12/13/2021  AHI: 5.3  TIME USED: 7 hours 46 minutes   PRESSURE SETTINGS: Max IPAP 17 cm water min EPAP 13 cmH2O  LEAK: Intermittent  OTHER: The air pressure will climb if patient has severe mask leak    SLEEP STUDY RESULTS:  7/25/2021     This was a fully attended sleep study .   This test was technically adequate.  CPAP was tried from 6cm H2O to 15 cm H2O. Bipap was tried from 16/12 cm H2O to 18/14 cm H2O. Although it appears that he did  best on 18/14 cmH2O with an AHI of 4.44 as the last pressure of the night-- he had been tries on this exact pressure just 30 mins earlier and he had an AHI of 86.67.  This titration was not  adequate.       ASSESSMENT:     DIAGNOSTIC :  Severe  obstructive sleep apnea hypopnea - AHI 72.1  Severe Nocturnal desaturation - jinny saturation 68% - saturations <89% below for 139 minutes of TST.     TREATMENT :  Severe obstructive sleep apnea hypopnea - AHI 37.73  Severe Nocturnal desaturation - jinny saturation 78.0% - saturations <89% below for 105.5 minutes of TST.  This was an inadequate titration.     Patient was then referred to a full night titration with BiPAP.  He underwent that study on September 8, 2021.  See results below     Sleep study results:  TYPE: Full night titration with BiPAP  DATE: September 8, 2021  TREATMENT AHI: 6.49  TREATMENT Oxygen Jinny: 78.0% and patient spent 68.8 minutes under an SaO2 of 88%  TITRATION: Patient did best at an IPAP of 17 and EPAP of 13 cm water pressure.  He was that pressure for well over an hour and his AHI was " 0.8.    This patient was started on bilevel 17/13 as well as 2 L/min of oxygen due to his September 8, 2021 titration study significant desaturations.    Past Medical History:   Diagnosis Date   • Apnea, sleep     sometimes   • Daytime sleepiness    • Gasping for breath     sometimes   • Snoring     Occ        History reviewed. No pertinent surgical history.    Family History   Problem Relation Age of Onset   • Sleep Apnea Sister        Social History     Socioeconomic History   • Marital status:      Spouse name: Not on file   • Number of children: Not on file   • Years of education: Not on file   • Highest education level: Not on file   Occupational History   • Not on file   Tobacco Use   • Smoking status: Never Smoker   • Smokeless tobacco: Never Used   Vaping Use   • Vaping Use: Never used   Substance and Sexual Activity   • Alcohol use: Yes     Comment: occ    • Drug use: Yes     Types: Marijuana     Comment: occ    • Sexual activity: Not on file   Other Topics Concern   • Not on file   Social History Narrative   • Not on file     Social Determinants of Health     Financial Resource Strain:    • Difficulty of Paying Living Expenses: Not on file   Food Insecurity:    • Worried About Running Out of Food in the Last Year: Not on file   • Ran Out of Food in the Last Year: Not on file   Transportation Needs:    • Lack of Transportation (Medical): Not on file   • Lack of Transportation (Non-Medical): Not on file   Physical Activity:    • Days of Exercise per Week: Not on file   • Minutes of Exercise per Session: Not on file   Stress:    • Feeling of Stress : Not on file   Social Connections:    • Frequency of Communication with Friends and Family: Not on file   • Frequency of Social Gatherings with Friends and Family: Not on file   • Attends Druze Services: Not on file   • Active Member of Clubs or Organizations: Not on file   • Attends Club or Organization Meetings: Not on file   • Marital Status: Not on  "file   Intimate Partner Violence:    • Fear of Current or Ex-Partner: Not on file   • Emotionally Abused: Not on file   • Physically Abused: Not on file   • Sexually Abused: Not on file   Housing Stability:    • Unable to Pay for Housing in the Last Year: Not on file   • Number of Places Lived in the Last Year: Not on file   • Unstable Housing in the Last Year: Not on file       Current Outpatient Medications   Medication Sig Dispense Refill   • Azelastine HCl 0.15 % Solution Administer 2 Sprays into each nostril.     • Fexofenadine HCl (ALLEGRA ALLERGY PO) Take  by mouth.     • Pseudoephedrine HCl (SUDAFED PO) Take  by mouth.     • Multiple Vitamin (MULTIVITAMIN) capsule Take  by mouth.     • B Complex Cap Take  by mouth.     • zolpidem (AMBIEN) 5 MG Tab Take 1 tablet by mouth at bedtime as needed for Sleep for up to 2 doses. Use only on the night of your sleep study. (Patient not taking: Reported on 8/13/2021) 2 tablet 0     No current facility-administered medications for this visit.    \"CURRENT RX\"    ALLERGIES: Patient has no known allergies.    ROS    Constitutional: Denies  weight loss, less fatigue  Cardiovascular: Denies chest pain, tightness, palpitations, swelling in legs/feet, difficulty breathing when lying down but gets better when sitting up.   Respiratory: Denies shortness of breath during the day  Sleep: per HPI  Gastrointestinal: Denies  difficulty swallowing, heartburn.  Musculoskeletal: Denies painful joints, sore muscles, back pain.        PHYSICAL EXAM      /90 (BP Location: Left arm, Patient Position: Sitting, BP Cuff Size: Large adult)   Pulse 95   Resp 16   Ht 1.753 m (5' 9\")   Wt 120 kg (264 lb)   SpO2 96%   BMI 38.99 kg/m²   Appearance: Well-nourished, looks stated age, no acute distress  Eyes:   EOMI  ENMT: masked  Neck: Supple, trachea midline, no masses  Respiratory effort:  No intercostal retractions or use of accessory muscles  Lung auscultation:  No wheezes rhonchi rubs " or rales  Cardiac: No murmurs, rubs, or gallops; regular rhythm, normal rate; no edema  Musculoskeletal:  Grossly normal; gait and station normal; digits and nails normal  Skin:  No cyanosis  Neurologic: oriented to person, time, place, and purpose; judgement intact  Psychiatric:  No depression, anxiety, agitation      Medical Decision Making       The medical record was reviewed in its entirety including the referral notes, records from primary care, consultants notes, hospital records, lab, imaging, microbiology, immunology, and immunizations.  Care gaps identified and reviewed with the patient.    Diagnostic and titration nocturnal polysomnogram's, home sleep apnea tests, continuous nocturnal oximetry results, multiple sleep latency tests, and compliance reports reviewed.    ASSESSMENT/PLAN:    1. RICHARD (obstructive sleep apnea)    - Overnight Oximetry; Future: This oximetry is being done to determine whether or not this patient actually requires oxygen supplementation.  He was under 88% oxygen saturation for at least 60 minutes on his titration study which would normally call for oxygen supplementation.  However once his PAP therapy is stabilized he may not need oxygen supplementation.  - DME Mask and Supplies    2. Nocturnal oxygen desaturation    - Overnight Oximetry; Future  - DME Mask and Supplies    Has been advised to continue the current Pap Therapy.  Also advised to clean equipment frequently, and get new mask and supplies as allowed by insurance and DME.  Patient was advised to NEVER use  OZONE containing cleaning systems such as So Clean.    The risks of untreated sleep apnea were discussed with the patient at length. Patients with RICHARD are at increased risk of cardiovascular disease including coronary artery disease, systemic arterial hypertension, pulmonary arterial hypertension, cardiac arrhythmias, and stroke. RICHARD patients have an increased risk of motor vehicle accidents, type 2 diabetes, chronic  kidney disease, and non-alcoholic liver disease. The patient was advised to avoid driving a motor vehicle when drowsy.      Have advised the patient to follow up with the appropriate healthcare practitioners for all other medical problems and issues.      RTC:  3 months for results of OPO.

## 2022-01-28 ENCOUNTER — HOME STUDY (OUTPATIENT)
Dept: SLEEP MEDICINE | Facility: MEDICAL CENTER | Age: 67
End: 2022-01-28
Attending: PREVENTIVE MEDICINE
Payer: MEDICARE

## 2022-01-28 DIAGNOSIS — G47.33 OSA (OBSTRUCTIVE SLEEP APNEA): ICD-10-CM

## 2022-01-28 DIAGNOSIS — G47.34 NOCTURNAL OXYGEN DESATURATION: ICD-10-CM

## 2022-01-28 PROCEDURE — 94762 N-INVAS EAR/PLS OXIMTRY CONT: CPT | Performed by: PREVENTIVE MEDICINE

## 2022-02-01 NOTE — PROCEDURES
OXIMETRY while using PAP Therapy, NO supplemental oxygen.    Interpretation:     This overnight oximetry was recorded on January 28, 2022 with the patient on Pap therapy.  The analysis duration was 8 hours and 1 minute.  161 total oximetric events occurred encompassing 87.3 minutes .  The average event duration was 32.5 seconds .  The saturations were less than 90% for 13.4% of the recording.  The jinny saturation was 73% .  The patient spent 23.3 minutes with saturations < 88%.  Overall, this continuous nocturnal oximetry demonstrates the need for ongoing supplemental oxygen at a minimum of 2 L/min while on positive airway pressure therapy.      Recommendation: Continue oxygen supplementation at 2 L/min bleed in with Pap therapy.  Also the compliance was reviewed on this patient and a pressure support was added of 2 cmH2O.    Clinical correlation is needed.

## 2022-05-02 ENCOUNTER — APPOINTMENT (RX ONLY)
Dept: URBAN - METROPOLITAN AREA CLINIC 6 | Facility: CLINIC | Age: 67
Setting detail: DERMATOLOGY
End: 2022-05-02

## 2022-05-02 DIAGNOSIS — D492 NEOPLASM OF UNSPECIFIED NATURE OF BONE, SOFT TISSUE, AND SKIN: ICD-10-CM

## 2022-05-02 DIAGNOSIS — L82.0 INFLAMED SEBORRHEIC KERATOSIS: ICD-10-CM

## 2022-05-02 PROBLEM — R22.1 LOCALIZED SWELLING, MASS AND LUMP, NECK: Status: ACTIVE | Noted: 2022-05-02

## 2022-05-02 PROCEDURE — 99202 OFFICE O/P NEW SF 15 MIN: CPT

## 2022-05-02 PROCEDURE — ? ADDITIONAL NOTES

## 2022-05-02 PROCEDURE — ? ORDER ULTRASOUND

## 2022-05-02 PROCEDURE — ? COUNSELING

## 2022-05-02 PROCEDURE — ? LIQUID NITROGEN (COSMETIC)

## 2022-05-02 ASSESSMENT — LOCATION SIMPLE DESCRIPTION DERM
LOCATION SIMPLE: POSTERIOR NECK
LOCATION SIMPLE: LEFT CHEEK

## 2022-05-02 ASSESSMENT — LOCATION DETAILED DESCRIPTION DERM
LOCATION DETAILED: LEFT LATERAL MALAR CHEEK
LOCATION DETAILED: RIGHT LATERAL TRAPEZIAL NECK

## 2022-05-02 ASSESSMENT — LOCATION ZONE DERM
LOCATION ZONE: FACE
LOCATION ZONE: NECK

## 2022-05-02 NOTE — PROCEDURE: ADDITIONAL NOTES
Detail Level: Simple
Render Risk Assessment In Note?: no
Additional Notes: 6 x 4.5 cm. Patient reports the mass has grown in size over the past few years and has drained fluid in the past, but not for a long time. Patient would like the mass removed.

## 2022-05-02 NOTE — PROCEDURE: ORDER ULTRASOUND
Detail Level: Simple
Provider: Rajwinder Mitchell
Ultrasound Protocol: Ultrasound of Subcutaneous Mass
Priority: normal

## 2022-06-01 ENCOUNTER — APPOINTMENT (RX ONLY)
Dept: URBAN - METROPOLITAN AREA CLINIC 6 | Facility: CLINIC | Age: 67
Setting detail: DERMATOLOGY
End: 2022-06-01

## 2022-06-01 DIAGNOSIS — L72.0 EPIDERMAL CYST: ICD-10-CM

## 2022-06-01 PROCEDURE — 12042 INTMD RPR N-HF/GENIT2.6-7.5: CPT

## 2022-06-01 PROCEDURE — ? EXCISION

## 2022-06-01 PROCEDURE — 11426 EXC H-F-NK-SP B9+MARG >4 CM: CPT

## 2022-06-01 ASSESSMENT — LOCATION ZONE DERM: LOCATION ZONE: NECK

## 2022-06-01 ASSESSMENT — LOCATION DETAILED DESCRIPTION DERM: LOCATION DETAILED: RIGHT LATERAL TRAPEZIAL NECK

## 2022-06-01 ASSESSMENT — LOCATION SIMPLE DESCRIPTION DERM: LOCATION SIMPLE: POSTERIOR NECK

## 2022-06-01 NOTE — PROCEDURE: EXCISION
Medical Necessity Information: It is in your best interest to select a reason for this procedure from the list below. All of these items fulfill various CMS LCD requirements except lesion extends to a margin.
Include Z78.9 (Other Specified Conditions Influencing Health Status) As An Associated Diagnosis?: No
Medical Necessity Clause: This procedure was medically necessary because the lesion that was treated was:
Lab: [id number]
Lab Facility: 
Surgeon (Optional): Dr. Chuy Orosco
Biopsy Photograph Reviewed: Yes
Size Of Lesion In Cm: 5.5
X Size Of Lesion In Cm (Optional): 0
Size Of Margin In Cm: 0.1
Anesthesia Volume In Cc: 12
Eye Clamp Note Details: An eye clamp was used during the procedure.
Excision Method: Fusiform
Saucerization Depth: dermis and superficial adipose tissue
Repair Type: Intermediate
Suturegard Retention Suture: 2-0 Nylon
Retention Suture Bite Size: 3 mm
Length To Time In Minutes Device Was In Place: 10
Number Of Hemigard Strips Per Side: 1
Intermediate / Complex Repair - Final Wound Length In Cm: 6.8
Undermining Type: Entire Wound
Debridement Text: The wound edges were debrided prior to proceeding with the closure to facilitate wound healing.
Helical Rim Text: The closure involved the helical rim.
Vermilion Border Text: The closure involved the vermilion border.
Nostril Rim Text: The closure involved the nostril rim.
Retention Suture Text: Retention sutures were placed to support the closure and prevent dehiscence.
Suture Removal: 14 days
Epidermal Closure Graft Donor Site (Optional): simple interrupted
Graft Donor Site Bandage (Optional-Leave Blank If You Don't Want In Note): Steri-strips and a pressure bandage were applied to the donor site.
Detail Level: Detailed
Excision Depth: muscle
Scalpel Size: 15 blade
Anesthesia Type: 1% lidocaine with epinephrine
Additional Anesthesia Volume In Cc: 6
Hemostasis: Electrocautery
Estimated Blood Loss (Cc): minimal
Deep Sutures: 4-0 Monocryl
Epidermal Sutures: 4-0 Surgipro
Wound Care: Petrolatum
Dressing: pressure dressing with telfa
Suturegard Intro: Intraoperative tissue expansion was performed, utilizing the SUTUREGARD device, in order to reduce wound tension.
Suturegard Body: The suture ends were repeatedly re-tightened and re-clamped to achieve the desired tissue expansion.
Hemigard Intro: Due to skin fragility and wound tension, it was decided to use HEMIGARD adhesive retention suture devices to permit a linear closure. The skin was cleaned and dried for a 6cm distance away from the wound. Excessive hair, if present, was removed to allow for adhesion.
Hemigard Postcare Instructions: The HEMIGARD strips are to remain completely dry for at least 5-7 days.
Positioning (Leave Blank If You Do Not Want): The patient was placed in a comfortable position exposing the surgical site.
Complex Repair Preamble Text (Leave Blank If You Do Not Want): Extensive wide undermining was performed.
Intermediate Repair Preamble Text (Leave Blank If You Do Not Want): Undermining was performed with blunt dissection.
Fusiform Excision Additional Text (Leave Blank If You Do Not Want): The margin was drawn around the clinically apparent lesion.  A fusiform shape was then drawn on the skin incorporating the lesion and margins.  Incisions were then made along these lines to the appropriate tissue plane and the lesion was extirpated.
Eliptical Excision Additional Text (Leave Blank If You Do Not Want): The margin was drawn around the clinically apparent lesion.  An elliptical shape was then drawn on the skin incorporating the lesion and margins.  Incisions were then made along these lines to the appropriate tissue plane and the lesion was extirpated.
Saucerization Excision Additional Text (Leave Blank If You Do Not Want): The margin was drawn around the clinically apparent lesion.  Incisions were then made along these lines, in a tangential fashion, to the appropriate tissue plane and the lesion was extirpated.
Slit Excision Additional Text (Leave Blank If You Do Not Want): A linear line was drawn on the skin overlying the lesion. An incision was made slowly until the lesion was visualized.  Once visualized, the lesion was removed with blunt dissection.
Excisional Biopsy Additional Text (Leave Blank If You Do Not Want): The margin was drawn around the clinically apparent lesion. An elliptical shape was then drawn on the skin incorporating the lesion and margins.  Incisions were then made along these lines to the appropriate tissue plane and the lesion was extirpated.
Perilesional Excision Additional Text (Leave Blank If You Do Not Want): The margin was drawn around the clinically apparent lesion. Incisions were then made along these lines to the appropriate tissue plane and the lesion was extirpated.
Repair Performed By Another Provider Text (Leave Blank If You Do Not Want): After the tissue was excised the defect was repaired by another provider.
No Repair - Repaired With Adjacent Surgical Defect Text (Leave Blank If You Do Not Want): After the excision the defect was repaired concurrently with another surgical defect which was in close approximation.
Adjacent Tissue Transfer Text: The defect edges were debeveled with a #15 scalpel blade.  Given the location of the defect and the proximity to free margins an adjacent tissue transfer was deemed most appropriate.  Using a sterile surgical marker, an appropriate flap was drawn incorporating the defect and placing the expected incisions within the relaxed skin tension lines where possible.    The area thus outlined was incised deep to adipose tissue with a #15 scalpel blade.  The skin margins were undermined to an appropriate distance in all directions utilizing iris scissors.
Advancement Flap (Single) Text: The defect edges were debeveled with a #15 scalpel blade.  Given the location of the defect and the proximity to free margins a single advancement flap was deemed most appropriate.  Using a sterile surgical marker, an appropriate advancement flap was drawn incorporating the defect and placing the expected incisions within the relaxed skin tension lines where possible.    The area thus outlined was incised deep to adipose tissue with a #15 scalpel blade.  The skin margins were undermined to an appropriate distance in all directions utilizing iris scissors.
Advancement Flap (Double) Text: The defect edges were debeveled with a #15 scalpel blade.  Given the location of the defect and the proximity to free margins a double advancement flap was deemed most appropriate.  Using a sterile surgical marker, the appropriate advancement flaps were drawn incorporating the defect and placing the expected incisions within the relaxed skin tension lines where possible.    The area thus outlined was incised deep to adipose tissue with a #15 scalpel blade.  The skin margins were undermined to an appropriate distance in all directions utilizing iris scissors.
Burow's Advancement Flap Text: The defect edges were debeveled with a #15 scalpel blade.  Given the location of the defect and the proximity to free margins a Burow's advancement flap was deemed most appropriate.  Using a sterile surgical marker, the appropriate advancement flap was drawn incorporating the defect and placing the expected incisions within the relaxed skin tension lines where possible.    The area thus outlined was incised deep to adipose tissue with a #15 scalpel blade.  The skin margins were undermined to an appropriate distance in all directions utilizing iris scissors.
Chonodrocutaneous Helical Advancement Flap Text: The defect edges were debeveled with a #15 scalpel blade.  Given the location of the defect and the proximity to free margins a chondrocutaneous helical advancement flap was deemed most appropriate.  Using a sterile surgical marker, the appropriate advancement flap was drawn incorporating the defect and placing the expected incisions within the relaxed skin tension lines where possible.    The area thus outlined was incised deep to adipose tissue with a #15 scalpel blade.  The skin margins were undermined to an appropriate distance in all directions utilizing iris scissors.
Crescentic Advancement Flap Text: The defect edges were debeveled with a #15 scalpel blade.  Given the location of the defect and the proximity to free margins a crescentic advancement flap was deemed most appropriate.  Using a sterile surgical marker, the appropriate advancement flap was drawn incorporating the defect and placing the expected incisions within the relaxed skin tension lines where possible.    The area thus outlined was incised deep to adipose tissue with a #15 scalpel blade.  The skin margins were undermined to an appropriate distance in all directions utilizing iris scissors.
A-T Advancement Flap Text: The defect edges were debeveled with a #15 scalpel blade.  Given the location of the defect, shape of the defect and the proximity to free margins an A-T advancement flap was deemed most appropriate.  Using a sterile surgical marker, an appropriate advancement flap was drawn incorporating the defect and placing the expected incisions within the relaxed skin tension lines where possible.    The area thus outlined was incised deep to adipose tissue with a #15 scalpel blade.  The skin margins were undermined to an appropriate distance in all directions utilizing iris scissors.
O-T Advancement Flap Text: The defect edges were debeveled with a #15 scalpel blade.  Given the location of the defect, shape of the defect and the proximity to free margins an O-T advancement flap was deemed most appropriate.  Using a sterile surgical marker, an appropriate advancement flap was drawn incorporating the defect and placing the expected incisions within the relaxed skin tension lines where possible.    The area thus outlined was incised deep to adipose tissue with a #15 scalpel blade.  The skin margins were undermined to an appropriate distance in all directions utilizing iris scissors.
O-L Flap Text: The defect edges were debeveled with a #15 scalpel blade.  Given the location of the defect, shape of the defect and the proximity to free margins an O-L flap was deemed most appropriate.  Using a sterile surgical marker, an appropriate advancement flap was drawn incorporating the defect and placing the expected incisions within the relaxed skin tension lines where possible.    The area thus outlined was incised deep to adipose tissue with a #15 scalpel blade.  The skin margins were undermined to an appropriate distance in all directions utilizing iris scissors.
O-Z Flap Text: The defect edges were debeveled with a #15 scalpel blade.  Given the location of the defect, shape of the defect and the proximity to free margins an O-Z flap was deemed most appropriate.  Using a sterile surgical marker, an appropriate transposition flap was drawn incorporating the defect and placing the expected incisions within the relaxed skin tension lines where possible. The area thus outlined was incised deep to adipose tissue with a #15 scalpel blade.  The skin margins were undermined to an appropriate distance in all directions utilizing iris scissors.
Double O-Z Flap Text: The defect edges were debeveled with a #15 scalpel blade.  Given the location of the defect, shape of the defect and the proximity to free margins a Double O-Z flap was deemed most appropriate.  Using a sterile surgical marker, an appropriate transposition flap was drawn incorporating the defect and placing the expected incisions within the relaxed skin tension lines where possible. The area thus outlined was incised deep to adipose tissue with a #15 scalpel blade.  The skin margins were undermined to an appropriate distance in all directions utilizing iris scissors.
V-Y Flap Text: The defect edges were debeveled with a #15 scalpel blade.  Given the location of the defect, shape of the defect and the proximity to free margins a V-Y flap was deemed most appropriate.  Using a sterile surgical marker, an appropriate advancement flap was drawn incorporating the defect and placing the expected incisions within the relaxed skin tension lines where possible.    The area thus outlined was incised deep to adipose tissue with a #15 scalpel blade.  The skin margins were undermined to an appropriate distance in all directions utilizing iris scissors.
Advancement-Rotation Flap Text: The defect edges were debeveled with a #15 scalpel blade.  Given the location of the defect, shape of the defect and the proximity to free margins an advancement-rotation flap was deemed most appropriate.  Using a sterile surgical marker, an appropriate flap was drawn incorporating the defect and placing the expected incisions within the relaxed skin tension lines where possible. The area thus outlined was incised deep to adipose tissue with a #15 scalpel blade.  The skin margins were undermined to an appropriate distance in all directions utilizing iris scissors.
Mercedes Flap Text: The defect edges were debeveled with a #15 scalpel blade.  Given the location of the defect, shape of the defect and the proximity to free margins a Mercedes flap was deemed most appropriate.  Using a sterile surgical marker, an appropriate advancement flap was drawn incorporating the defect and placing the expected incisions within the relaxed skin tension lines where possible. The area thus outlined was incised deep to adipose tissue with a #15 scalpel blade.  The skin margins were undermined to an appropriate distance in all directions utilizing iris scissors.
Modified Advancement Flap Text: The defect edges were debeveled with a #15 scalpel blade.  Given the location of the defect, shape of the defect and the proximity to free margins a modified advancement flap was deemed most appropriate.  Using a sterile surgical marker, an appropriate advancement flap was drawn incorporating the defect and placing the expected incisions within the relaxed skin tension lines where possible.    The area thus outlined was incised deep to adipose tissue with a #15 scalpel blade.  The skin margins were undermined to an appropriate distance in all directions utilizing iris scissors.
Mucosal Advancement Flap Text: Given the location of the defect, shape of the defect and the proximity to free margins a mucosal advancement flap was deemed most appropriate. Incisions were made with a 15 blade scalpel in the appropriate fashion along the cutaneous vermilion border and the mucosal lip. The remaining actinically damaged mucosal tissue was excised.  The mucosal advancement flap was then elevated to the gingival sulcus with care taken to preserve the neurovascular structures and advanced into the primary defect. Care was taken to ensure that precise realignment of the vermilion border was achieved.
Peng Advancement Flap Text: The defect edges were debeveled with a #15 scalpel blade.  Given the location of the defect, shape of the defect and the proximity to free margins a Peng advancement flap was deemed most appropriate.  Using a sterile surgical marker, an appropriate advancement flap was drawn incorporating the defect and placing the expected incisions within the relaxed skin tension lines where possible. The area thus outlined was incised deep to adipose tissue with a #15 scalpel blade.  The skin margins were undermined to an appropriate distance in all directions utilizing iris scissors.
Hatchet Flap Text: The defect edges were debeveled with a #15 scalpel blade.  Given the location of the defect, shape of the defect and the proximity to free margins a hatchet flap was deemed most appropriate.  Using a sterile surgical marker, an appropriate hatchet flap was drawn incorporating the defect and placing the expected incisions within the relaxed skin tension lines where possible.    The area thus outlined was incised deep to adipose tissue with a #15 scalpel blade.  The skin margins were undermined to an appropriate distance in all directions utilizing iris scissors.
Rotation Flap Text: The defect edges were debeveled with a #15 scalpel blade.  Given the location of the defect, shape of the defect and the proximity to free margins a rotation flap was deemed most appropriate.  Using a sterile surgical marker, an appropriate rotation flap was drawn incorporating the defect and placing the expected incisions within the relaxed skin tension lines where possible.    The area thus outlined was incised deep to adipose tissue with a #15 scalpel blade.  The skin margins were undermined to an appropriate distance in all directions utilizing iris scissors.
Spiral Flap Text: The defect edges were debeveled with a #15 scalpel blade.  Given the location of the defect, shape of the defect and the proximity to free margins a spiral flap was deemed most appropriate.  Using a sterile surgical marker, an appropriate rotation flap was drawn incorporating the defect and placing the expected incisions within the relaxed skin tension lines where possible. The area thus outlined was incised deep to adipose tissue with a #15 scalpel blade.  The skin margins were undermined to an appropriate distance in all directions utilizing iris scissors.
Staged Advancement Flap Text: The defect edges were debeveled with a #15 scalpel blade.  Given the location of the defect, shape of the defect and the proximity to free margins a staged advancement flap was deemed most appropriate.  Using a sterile surgical marker, an appropriate advancement flap was drawn incorporating the defect and placing the expected incisions within the relaxed skin tension lines where possible. The area thus outlined was incised deep to adipose tissue with a #15 scalpel blade.  The skin margins were undermined to an appropriate distance in all directions utilizing iris scissors.
Star Wedge Flap Text: The defect edges were debeveled with a #15 scalpel blade.  Given the location of the defect, shape of the defect and the proximity to free margins a star wedge flap was deemed most appropriate.  Using a sterile surgical marker, an appropriate rotation flap was drawn incorporating the defect and placing the expected incisions within the relaxed skin tension lines where possible. The area thus outlined was incised deep to adipose tissue with a #15 scalpel blade.  The skin margins were undermined to an appropriate distance in all directions utilizing iris scissors.
Transposition Flap Text: The defect edges were debeveled with a #15 scalpel blade.  Given the location of the defect and the proximity to free margins a transposition flap was deemed most appropriate.  Using a sterile surgical marker, an appropriate transposition flap was drawn incorporating the defect.    The area thus outlined was incised deep to adipose tissue with a #15 scalpel blade.  The skin margins were undermined to an appropriate distance in all directions utilizing iris scissors.
Muscle Hinge Flap Text: The defect edges were debeveled with a #15 scalpel blade.  Given the size, depth and location of the defect and the proximity to free margins a muscle hinge flap was deemed most appropriate.  Using a sterile surgical marker, an appropriate hinge flap was drawn incorporating the defect. The area thus outlined was incised with a #15 scalpel blade.  The skin margins were undermined to an appropriate distance in all directions utilizing iris scissors.
Mustarde Flap Text: The defect edges were debeveled with a #15 scalpel blade.  Given the size, depth and location of the defect and the proximity to free margins a Mustarde flap was deemed most appropriate.  Using a sterile surgical marker, an appropriate flap was drawn incorporating the defect. The area thus outlined was incised with a #15 scalpel blade.  The skin margins were undermined to an appropriate distance in all directions utilizing iris scissors.
Nasal Turnover Hinge Flap Text: The defect edges were debeveled with a #15 scalpel blade.  Given the size, depth, location of the defect and the defect being full thickness a nasal turnover hinge flap was deemed most appropriate.  Using a sterile surgical marker, an appropriate hinge flap was drawn incorporating the defect. The area thus outlined was incised with a #15 scalpel blade. The flap was designed to recreate the nasal mucosal lining and the alar rim. The skin margins were undermined to an appropriate distance in all directions utilizing iris scissors.
Nasalis-Muscle-Based Myocutaneous Island Pedicle Flap Text: Using a #15 blade, an incision was made around the donor flap to the level of the nasalis muscle. Wide lateral undermining was then performed in both the subcutaneous plane above the nasalis muscle, and in a submuscular plane just above periosteum. This allowed the formation of a free nasalis muscle axial pedicle (based on the angular artery) which was still attached to the actual cutaneous flap, increasing its mobility and vascular viability. Hemostasis was obtained with pinpoint electrocoagulation. The flap was mobilized into position and the pivotal anchor points positioned and stabilized with buried interrupted sutures. Subcutaneous and dermal tissues were closed in a multilayered fashion with sutures. Tissue redundancies were excised, and the epidermal edges were apposed without significant tension and sutured with sutures.
Orbicularis Oris Muscle Flap Text: The defect edges were debeveled with a #15 scalpel blade.  Given that the defect affected the competency of the oral sphincter an orbicularis oris muscle flap was deemed most appropriate to restore this competency and normal muscle function.  Using a sterile surgical marker, an appropriate flap was drawn incorporating the defect. The area thus outlined was incised with a #15 scalpel blade.
Melolabial Transposition Flap Text: The defect edges were debeveled with a #15 scalpel blade.  Given the location of the defect and the proximity to free margins a melolabial flap was deemed most appropriate.  Using a sterile surgical marker, an appropriate melolabial transposition flap was drawn incorporating the defect.    The area thus outlined was incised deep to adipose tissue with a #15 scalpel blade.  The skin margins were undermined to an appropriate distance in all directions utilizing iris scissors.
Rhombic Flap Text: The defect edges were debeveled with a #15 scalpel blade.  Given the location of the defect and the proximity to free margins a rhombic flap was deemed most appropriate.  Using a sterile surgical marker, an appropriate rhombic flap was drawn incorporating the defect.    The area thus outlined was incised deep to adipose tissue with a #15 scalpel blade.  The skin margins were undermined to an appropriate distance in all directions utilizing iris scissors.
Rhomboid Transposition Flap Text: The defect edges were debeveled with a #15 scalpel blade.  Given the location of the defect and the proximity to free margins a rhomboid transposition flap was deemed most appropriate.  Using a sterile surgical marker, an appropriate rhomboid flap was drawn incorporating the defect.    The area thus outlined was incised deep to adipose tissue with a #15 scalpel blade.  The skin margins were undermined to an appropriate distance in all directions utilizing iris scissors.
Bi-Rhombic Flap Text: The defect edges were debeveled with a #15 scalpel blade.  Given the location of the defect and the proximity to free margins a bi-rhombic flap was deemed most appropriate.  Using a sterile surgical marker, an appropriate rhombic flap was drawn incorporating the defect. The area thus outlined was incised deep to adipose tissue with a #15 scalpel blade.  The skin margins were undermined to an appropriate distance in all directions utilizing iris scissors.
Helical Rim Advancement Flap Text: The defect edges were debeveled with a #15 blade scalpel.  Given the location of the defect and the proximity to free margins (helical rim) a double helical rim advancement flap was deemed most appropriate.  Using a sterile surgical marker, the appropriate advancement flaps were drawn incorporating the defect and placing the expected incisions between the helical rim and antihelix where possible.  The area thus outlined was incised through and through with a #15 scalpel blade.  With a skin hook and iris scissors, the flaps were gently and sharply undermined and freed up.
Bilateral Helical Rim Advancement Flap Text: The defect edges were debeveled with a #15 blade scalpel.  Given the location of the defect and the proximity to free margins (helical rim) a bilateral helical rim advancement flap was deemed most appropriate.  Using a sterile surgical marker, the appropriate advancement flaps were drawn incorporating the defect and placing the expected incisions between the helical rim and antihelix where possible.  The area thus outlined was incised through and through with a #15 scalpel blade.  With a skin hook and iris scissors, the flaps were gently and sharply undermined and freed up.
Ear Star Wedge Flap Text: The defect edges were debeveled with a #15 blade scalpel.  Given the location of the defect and the proximity to free margins (helical rim) an ear star wedge flap was deemed most appropriate.  Using a sterile surgical marker, the appropriate flap was drawn incorporating the defect and placing the expected incisions between the helical rim and antihelix where possible.  The area thus outlined was incised through and through with a #15 scalpel blade.
Banner Transposition Flap Text: The defect edges were debeveled with a #15 scalpel blade.  Given the location of the defect and the proximity to free margins a Banner transposition flap was deemed most appropriate.  Using a sterile surgical marker, an appropriate flap drawn around the defect. The area thus outlined was incised deep to adipose tissue with a #15 scalpel blade.  The skin margins were undermined to an appropriate distance in all directions utilizing iris scissors.
Bilobed Flap Text: The defect edges were debeveled with a #15 scalpel blade.  Given the location of the defect and the proximity to free margins a bilobe flap was deemed most appropriate.  Using a sterile surgical marker, an appropriate bilobe flap drawn around the defect.    The area thus outlined was incised deep to adipose tissue with a #15 scalpel blade.  The skin margins were undermined to an appropriate distance in all directions utilizing iris scissors.
Bilobed Transposition Flap Text: The defect edges were debeveled with a #15 scalpel blade.  Given the location of the defect and the proximity to free margins a bilobed transposition flap was deemed most appropriate.  Using a sterile surgical marker, an appropriate bilobe flap drawn around the defect.    The area thus outlined was incised deep to adipose tissue with a #15 scalpel blade.  The skin margins were undermined to an appropriate distance in all directions utilizing iris scissors.
Trilobed Flap Text: The defect edges were debeveled with a #15 scalpel blade.  Given the location of the defect and the proximity to free margins a trilobed flap was deemed most appropriate.  Using a sterile surgical marker, an appropriate trilobed flap drawn around the defect.    The area thus outlined was incised deep to adipose tissue with a #15 scalpel blade.  The skin margins were undermined to an appropriate distance in all directions utilizing iris scissors.
Dorsal Nasal Flap Text: The defect edges were debeveled with a #15 scalpel blade.  Given the location of the defect and the proximity to free margins a dorsal nasal flap was deemed most appropriate.  Using a sterile surgical marker, an appropriate dorsal nasal flap was drawn around the defect.    The area thus outlined was incised deep to adipose tissue with a #15 scalpel blade.  The skin margins were undermined to an appropriate distance in all directions utilizing iris scissors.
Island Pedicle Flap Text: The defect edges were debeveled with a #15 scalpel blade.  Given the location of the defect, shape of the defect and the proximity to free margins an island pedicle advancement flap was deemed most appropriate.  Using a sterile surgical marker, an appropriate advancement flap was drawn incorporating the defect, outlining the appropriate donor tissue and placing the expected incisions within the relaxed skin tension lines where possible.    The area thus outlined was incised deep to adipose tissue with a #15 scalpel blade.  The skin margins were undermined to an appropriate distance in all directions around the primary defect and laterally outward around the island pedicle utilizing iris scissors.  There was minimal undermining beneath the pedicle flap.
Island Pedicle Flap With Canthal Suspension Text: The defect edges were debeveled with a #15 scalpel blade.  Given the location of the defect, shape of the defect and the proximity to free margins an island pedicle advancement flap was deemed most appropriate.  Using a sterile surgical marker, an appropriate advancement flap was drawn incorporating the defect, outlining the appropriate donor tissue and placing the expected incisions within the relaxed skin tension lines where possible. The area thus outlined was incised deep to adipose tissue with a #15 scalpel blade.  The skin margins were undermined to an appropriate distance in all directions around the primary defect and laterally outward around the island pedicle utilizing iris scissors.  There was minimal undermining beneath the pedicle flap. A suspension suture was placed in the canthal tendon to prevent tension and prevent ectropion.
Alar Island Pedicle Flap Text: The defect edges were debeveled with a #15 scalpel blade.  Given the location of the defect, shape of the defect and the proximity to the alar rim an island pedicle advancement flap was deemed most appropriate.  Using a sterile surgical marker, an appropriate advancement flap was drawn incorporating the defect, outlining the appropriate donor tissue and placing the expected incisions within the nasal ala running parallel to the alar rim. The area thus outlined was incised with a #15 scalpel blade.  The skin margins were undermined minimally to an appropriate distance in all directions around the primary defect and laterally outward around the island pedicle utilizing iris scissors.  There was minimal undermining beneath the pedicle flap.
Double Island Pedicle Flap Text: The defect edges were debeveled with a #15 scalpel blade.  Given the location of the defect, shape of the defect and the proximity to free margins a double island pedicle advancement flap was deemed most appropriate.  Using a sterile surgical marker, an appropriate advancement flap was drawn incorporating the defect, outlining the appropriate donor tissue and placing the expected incisions within the relaxed skin tension lines where possible.    The area thus outlined was incised deep to adipose tissue with a #15 scalpel blade.  The skin margins were undermined to an appropriate distance in all directions around the primary defect and laterally outward around the island pedicle utilizing iris scissors.  There was minimal undermining beneath the pedicle flap.
Island Pedicle Flap-Requiring Vessel Identification Text: The defect edges were debeveled with a #15 scalpel blade.  Given the location of the defect, shape of the defect and the proximity to free margins an island pedicle advancement flap was deemed most appropriate.  Using a sterile surgical marker, an appropriate advancement flap was drawn, based on the axial vessel mentioned above, incorporating the defect, outlining the appropriate donor tissue and placing the expected incisions within the relaxed skin tension lines where possible.    The area thus outlined was incised deep to adipose tissue with a #15 scalpel blade.  The skin margins were undermined to an appropriate distance in all directions around the primary defect and laterally outward around the island pedicle utilizing iris scissors.  There was minimal undermining beneath the pedicle flap.
Keystone Flap Text: The defect edges were debeveled with a #15 scalpel blade.  Given the location of the defect, shape of the defect a keystone flap was deemed most appropriate.  Using a sterile surgical marker, an appropriate keystone flap was drawn incorporating the defect, outlining the appropriate donor tissue and placing the expected incisions within the relaxed skin tension lines where possible. The area thus outlined was incised deep to adipose tissue with a #15 scalpel blade.  The skin margins were undermined to an appropriate distance in all directions around the primary defect and laterally outward around the flap utilizing iris scissors.
O-T Plasty Text: The defect edges were debeveled with a #15 scalpel blade.  Given the location of the defect, shape of the defect and the proximity to free margins an O-T plasty was deemed most appropriate.  Using a sterile surgical marker, an appropriate O-T plasty was drawn incorporating the defect and placing the expected incisions within the relaxed skin tension lines where possible.    The area thus outlined was incised deep to adipose tissue with a #15 scalpel blade.  The skin margins were undermined to an appropriate distance in all directions utilizing iris scissors.
O-Z Plasty Text: The defect edges were debeveled with a #15 scalpel blade.  Given the location of the defect, shape of the defect and the proximity to free margins an O-Z plasty (double transposition flap) was deemed most appropriate.  Using a sterile surgical marker, the appropriate transposition flaps were drawn incorporating the defect and placing the expected incisions within the relaxed skin tension lines where possible.    The area thus outlined was incised deep to adipose tissue with a #15 scalpel blade.  The skin margins were undermined to an appropriate distance in all directions utilizing iris scissors.  Hemostasis was achieved with electrocautery.  The flaps were then transposed into place, one clockwise and the other counterclockwise, and anchored with interrupted buried subcutaneous sutures.
Double O-Z Plasty Text: The defect edges were debeveled with a #15 scalpel blade.  Given the location of the defect, shape of the defect and the proximity to free margins a Double O-Z plasty (double transposition flap) was deemed most appropriate.  Using a sterile surgical marker, the appropriate transposition flaps were drawn incorporating the defect and placing the expected incisions within the relaxed skin tension lines where possible. The area thus outlined was incised deep to adipose tissue with a #15 scalpel blade.  The skin margins were undermined to an appropriate distance in all directions utilizing iris scissors.  Hemostasis was achieved with electrocautery.  The flaps were then transposed into place, one clockwise and the other counterclockwise, and anchored with interrupted buried subcutaneous sutures.
V-Y Plasty Text: The defect edges were debeveled with a #15 scalpel blade.  Given the location of the defect, shape of the defect and the proximity to free margins an V-Y advancement flap was deemed most appropriate.  Using a sterile surgical marker, an appropriate advancement flap was drawn incorporating the defect and placing the expected incisions within the relaxed skin tension lines where possible.    The area thus outlined was incised deep to adipose tissue with a #15 scalpel blade.  The skin margins were undermined to an appropriate distance in all directions utilizing iris scissors.
H Plasty Text: Given the location of the defect, shape of the defect and the proximity to free margins a H-plasty was deemed most appropriate for repair.  Using a sterile surgical marker, the appropriate advancement arms of the H-plasty were drawn incorporating the defect and placing the expected incisions within the relaxed skin tension lines where possible. The area thus outlined was incised deep to adipose tissue with a #15 scalpel blade. The skin margins were undermined to an appropriate distance in all directions utilizing iris scissors.  The opposing advancement arms were then advanced into place in opposite direction and anchored with interrupted buried subcutaneous sutures.
W Plasty Text: The lesion was extirpated to the level of the fat with a #15 scalpel blade.  Given the location of the defect, shape of the defect and the proximity to free margins a W-plasty was deemed most appropriate for repair.  Using a sterile surgical marker, the appropriate transposition arms of the W-plasty were drawn incorporating the defect and placing the expected incisions within the relaxed skin tension lines where possible.    The area thus outlined was incised deep to adipose tissue with a #15 scalpel blade.  The skin margins were undermined to an appropriate distance in all directions utilizing iris scissors.  The opposing transposition arms were then transposed into place in opposite direction and anchored with interrupted buried subcutaneous sutures.
Z Plasty Text: The lesion was extirpated to the level of the fat with a #15 scalpel blade.  Given the location of the defect, shape of the defect and the proximity to free margins a Z-plasty was deemed most appropriate for repair.  Using a sterile surgical marker, the appropriate transposition arms of the Z-plasty were drawn incorporating the defect and placing the expected incisions within the relaxed skin tension lines where possible.    The area thus outlined was incised deep to adipose tissue with a #15 scalpel blade.  The skin margins were undermined to an appropriate distance in all directions utilizing iris scissors.  The opposing transposition arms were then transposed into place in opposite direction and anchored with interrupted buried subcutaneous sutures.
Zygomaticofacial Flap Text: Given the location of the defect, shape of the defect and the proximity to free margins a zygomaticofacial flap was deemed most appropriate for repair.  Using a sterile surgical marker, the appropriate flap was drawn incorporating the defect and placing the expected incisions within the relaxed skin tension lines where possible. The area thus outlined was incised deep to adipose tissue with a #15 scalpel blade with preservation of a vascular pedicle.  The skin margins were undermined to an appropriate distance in all directions utilizing iris scissors.  The flap was then placed into the defect and anchored with interrupted buried subcutaneous sutures.
Cheek Interpolation Flap Text: A decision was made to reconstruct the defect utilizing an interpolation axial flap and a staged reconstruction.  A telfa template was made of the defect.  This telfa template was then used to outline the Cheek Interpolation flap.  The donor area for the pedicle flap was then injected with anesthesia.  The flap was excised through the skin and subcutaneous tissue down to the layer of the underlying musculature.  The interpolation flap was carefully excised within this deep plane to maintain its blood supply.  The edges of the donor site were undermined.   The donor site was closed in a primary fashion.  The pedicle was then rotated into position and sutured.  Once the tube was sutured into place, adequate blood supply was confirmed with blanching and refill.  The pedicle was then wrapped with xeroform gauze and dressed appropriately with a telfa and gauze bandage to ensure continued blood supply and protect the attached pedicle.
Cheek-To-Nose Interpolation Flap Text: A decision was made to reconstruct the defect utilizing an interpolation axial flap and a staged reconstruction.  A telfa template was made of the defect.  This telfa template was then used to outline the Cheek-To-Nose Interpolation flap.  The donor area for the pedicle flap was then injected with anesthesia.  The flap was excised through the skin and subcutaneous tissue down to the layer of the underlying musculature.  The interpolation flap was carefully excised within this deep plane to maintain its blood supply.  The edges of the donor site were undermined.   The donor site was closed in a primary fashion.  The pedicle was then rotated into position and sutured.  Once the tube was sutured into place, adequate blood supply was confirmed with blanching and refill.  The pedicle was then wrapped with xeroform gauze and dressed appropriately with a telfa and gauze bandage to ensure continued blood supply and protect the attached pedicle.
Interpolation Flap Text: A decision was made to reconstruct the defect utilizing an interpolation axial flap and a staged reconstruction.  A telfa template was made of the defect.  This telfa template was then used to outline the interpolation flap.  The donor area for the pedicle flap was then injected with anesthesia.  The flap was excised through the skin and subcutaneous tissue down to the layer of the underlying musculature.  The interpolation flap was carefully excised within this deep plane to maintain its blood supply.  The edges of the donor site were undermined.   The donor site was closed in a primary fashion.  The pedicle was then rotated into position and sutured.  Once the tube was sutured into place, adequate blood supply was confirmed with blanching and refill.  The pedicle was then wrapped with xeroform gauze and dressed appropriately with a telfa and gauze bandage to ensure continued blood supply and protect the attached pedicle.
Melolabial Interpolation Flap Text: A decision was made to reconstruct the defect utilizing an interpolation axial flap and a staged reconstruction.  A telfa template was made of the defect.  This telfa template was then used to outline the melolabial interpolation flap.  The donor area for the pedicle flap was then injected with anesthesia.  The flap was excised through the skin and subcutaneous tissue down to the layer of the underlying musculature.  The pedicle flap was carefully excised within this deep plane to maintain its blood supply.  The edges of the donor site were undermined.   The donor site was closed in a primary fashion.  The pedicle was then rotated into position and sutured.  Once the tube was sutured into place, adequate blood supply was confirmed with blanching and refill.  The pedicle was then wrapped with xeroform gauze and dressed appropriately with a telfa and gauze bandage to ensure continued blood supply and protect the attached pedicle.
Mastoid Interpolation Flap Text: A decision was made to reconstruct the defect utilizing an interpolation axial flap and a staged reconstruction.  A telfa template was made of the defect.  This telfa template was then used to outline the mastoid interpolation flap.  The donor area for the pedicle flap was then injected with anesthesia.  The flap was excised through the skin and subcutaneous tissue down to the layer of the underlying musculature.  The pedicle flap was carefully excised within this deep plane to maintain its blood supply.  The edges of the donor site were undermined.   The donor site was closed in a primary fashion.  The pedicle was then rotated into position and sutured.  Once the tube was sutured into place, adequate blood supply was confirmed with blanching and refill.  The pedicle was then wrapped with xeroform gauze and dressed appropriately with a telfa and gauze bandage to ensure continued blood supply and protect the attached pedicle.
Posterior Auricular Interpolation Flap Text: A decision was made to reconstruct the defect utilizing an interpolation axial flap and a staged reconstruction.  A telfa template was made of the defect.  This telfa template was then used to outline the posterior auricular interpolation flap.  The donor area for the pedicle flap was then injected with anesthesia.  The flap was excised through the skin and subcutaneous tissue down to the layer of the underlying musculature.  The pedicle flap was carefully excised within this deep plane to maintain its blood supply.  The edges of the donor site were undermined.   The donor site was closed in a primary fashion.  The pedicle was then rotated into position and sutured.  Once the tube was sutured into place, adequate blood supply was confirmed with blanching and refill.  The pedicle was then wrapped with xeroform gauze and dressed appropriately with a telfa and gauze bandage to ensure continued blood supply and protect the attached pedicle.
Paramedian Forehead Flap Text: A decision was made to reconstruct the defect utilizing an interpolation axial flap and a staged reconstruction.  A telfa template was made of the defect.  This telfa template was then used to outline the paramedian forehead pedicle flap.  The donor area for the pedicle flap was then injected with anesthesia.  The flap was excised through the skin and subcutaneous tissue down to the layer of the underlying musculature.  The pedicle flap was carefully excised within this deep plane to maintain its blood supply.  The edges of the donor site were undermined.   The donor site was closed in a primary fashion.  The pedicle was then rotated into position and sutured.  Once the tube was sutured into place, adequate blood supply was confirmed with blanching and refill.  The pedicle was then wrapped with xeroform gauze and dressed appropriately with a telfa and gauze bandage to ensure continued blood supply and protect the attached pedicle.
Lip Wedge Excision Repair Text: Given the location of the defect and the proximity to free margins a full thickness wedge repair was deemed most appropriate.  Using a sterile surgical marker, the appropriate repair was drawn incorporating the defect and placing the expected incisions perpendicular to the vermilion border.  The vermilion border was also meticulously outlined to ensure appropriate reapproximation during the repair.  The area thus outlined was incised through and through with a #15 scalpel blade.  The muscularis and dermis were reaproximated with deep sutures following hemostasis. Care was taken to realign the vermilion border before proceeding with the superficial closure.  Once the vermilion was realigned the superfical and mucosal closure was finished.
Ftsg Text: The defect edges were debeveled with a #15 scalpel blade.  Given the location of the defect, shape of the defect and the proximity to free margins a full thickness skin graft was deemed most appropriate.  Using a sterile surgical marker, the primary defect shape was transferred to the donor site. The area thus outlined was incised deep to adipose tissue with a #15 scalpel blade.  The harvested graft was then trimmed of adipose tissue until only dermis and epidermis was left.  The skin margins of the secondary defect were undermined to an appropriate distance in all directions utilizing iris scissors.  The secondary defect was closed with interrupted buried subcutaneous sutures.  The skin edges were then re-apposed with running  sutures.  The skin graft was then placed in the primary defect and oriented appropriately.
Split-Thickness Skin Graft Text: The defect edges were debeveled with a #15 scalpel blade.  Given the location of the defect, shape of the defect and the proximity to free margins a split thickness skin graft was deemed most appropriate.  Using a sterile surgical marker, the primary defect shape was transferred to the donor site. The split thickness graft was then harvested.  The skin graft was then placed in the primary defect and oriented appropriately.
Burow's Graft Text: The defect edges were debeveled with a #15 scalpel blade.  Given the location of the defect, shape of the defect, the proximity to free margins and the presence of a standing cone deformity a Burow's skin graft was deemed most appropriate. The standing cone was removed and this tissue was then trimmed to the shape of the primary defect. The adipose tissue was also removed until only dermis and epidermis were left.  The skin margins of the secondary defect were undermined to an appropriate distance in all directions utilizing iris scissors.  The secondary defect was closed with interrupted buried subcutaneous sutures.  The skin edges were then re-apposed with running  sutures.  The skin graft was then placed in the primary defect and oriented appropriately.
Cartilage Graft Text: The defect edges were debeveled with a #15 scalpel blade.  Given the location of the defect, shape of the defect, the fact the defect involved a full thickness cartilage defect a cartilage graft was deemed most appropriate.  An appropriate donor site was identified, cleansed, and anesthetized. The cartilage graft was then harvested and transferred to the recipient site, oriented appropriately and then sutured into place.  The secondary defect was then repaired using a primary closure.
Composite Graft Text: The defect edges were debeveled with a #15 scalpel blade.  Given the location of the defect, shape of the defect, the proximity to free margins and the fact the defect was full thickness a composite graft was deemed most appropriate.  The defect was outline and then transferred to the donor site.  A full thickness graft was then excised from the donor site. The graft was then placed in the primary defect, oriented appropriately and then sutured into place.  The secondary defect was then repaired using a primary closure.
Epidermal Autograft Text: The defect edges were debeveled with a #15 scalpel blade.  Given the location of the defect, shape of the defect and the proximity to free margins an epidermal autograft was deemed most appropriate.  Using a sterile surgical marker, the primary defect shape was transferred to the donor site. The epidermal graft was then harvested.  The skin graft was then placed in the primary defect and oriented appropriately.
Dermal Autograft Text: The defect edges were debeveled with a #15 scalpel blade.  Given the location of the defect, shape of the defect and the proximity to free margins a dermal autograft was deemed most appropriate.  Using a sterile surgical marker, the primary defect shape was transferred to the donor site. The area thus outlined was incised deep to adipose tissue with a #15 scalpel blade.  The harvested graft was then trimmed of adipose and epidermal tissue until only dermis was left.  The skin graft was then placed in the primary defect and oriented appropriately.
Skin Substitute Text: The defect edges were debeveled with a #15 scalpel blade.  Given the location of the defect, shape of the defect and the proximity to free margins a skin substitute graft was deemed most appropriate.  The graft material was trimmed to fit the size of the defect. The graft was then placed in the primary defect and oriented appropriately.
Tissue Cultured Epidermal Autograft Text: The defect edges were debeveled with a #15 scalpel blade.  Given the location of the defect, shape of the defect and the proximity to free margins a tissue cultured epidermal autograft was deemed most appropriate.  The graft was then trimmed to fit the size of the defect.  The graft was then placed in the primary defect and oriented appropriately.
Xenograft Text: The defect edges were debeveled with a #15 scalpel blade.  Given the location of the defect, shape of the defect and the proximity to free margins a xenograft was deemed most appropriate.  The graft was then trimmed to fit the size of the defect.  The graft was then placed in the primary defect and oriented appropriately.
Purse String (Intermediate) Text: Given the location of the defect and the characteristics of the surrounding skin a purse string intermediate closure was deemed most appropriate.  Undermining was performed circumferentially around the surgical defect.  A purse string suture was then placed and tightened.
Purse String (Simple) Text: Given the location of the defect and the characteristics of the surrounding skin a purse string simple closure was deemed most appropriate.  Undermining was performed circumferentially around the surgical defect.  A purse string suture was then placed and tightened.
Complex Repair And Single Advancement Flap Text: The defect edges were debeveled with a #15 scalpel blade.  The primary defect was closed partially with a complex linear closure.  Given the location of the remaining defect, shape of the defect and the proximity to free margins a single advancement flap was deemed most appropriate for complete closure of the defect.  Using a sterile surgical marker, an appropriate advancement flap was drawn incorporating the defect and placing the expected incisions within the relaxed skin tension lines where possible.    The area thus outlined was incised deep to adipose tissue with a #15 scalpel blade.  The skin margins were undermined to an appropriate distance in all directions utilizing iris scissors.
Complex Repair And Double Advancement Flap Text: The defect edges were debeveled with a #15 scalpel blade.  The primary defect was closed partially with a complex linear closure.  Given the location of the remaining defect, shape of the defect and the proximity to free margins a double advancement flap was deemed most appropriate for complete closure of the defect.  Using a sterile surgical marker, an appropriate advancement flap was drawn incorporating the defect and placing the expected incisions within the relaxed skin tension lines where possible.    The area thus outlined was incised deep to adipose tissue with a #15 scalpel blade.  The skin margins were undermined to an appropriate distance in all directions utilizing iris scissors.
Complex Repair And Modified Advancement Flap Text: The defect edges were debeveled with a #15 scalpel blade.  The primary defect was closed partially with a complex linear closure.  Given the location of the remaining defect, shape of the defect and the proximity to free margins a modified advancement flap was deemed most appropriate for complete closure of the defect.  Using a sterile surgical marker, an appropriate advancement flap was drawn incorporating the defect and placing the expected incisions within the relaxed skin tension lines where possible.    The area thus outlined was incised deep to adipose tissue with a #15 scalpel blade.  The skin margins were undermined to an appropriate distance in all directions utilizing iris scissors.
Complex Repair And A-T Advancement Flap Text: The defect edges were debeveled with a #15 scalpel blade.  The primary defect was closed partially with a complex linear closure.  Given the location of the remaining defect, shape of the defect and the proximity to free margins an A-T advancement flap was deemed most appropriate for complete closure of the defect.  Using a sterile surgical marker, an appropriate advancement flap was drawn incorporating the defect and placing the expected incisions within the relaxed skin tension lines where possible.    The area thus outlined was incised deep to adipose tissue with a #15 scalpel blade.  The skin margins were undermined to an appropriate distance in all directions utilizing iris scissors.
Complex Repair And O-T Advancement Flap Text: The defect edges were debeveled with a #15 scalpel blade.  The primary defect was closed partially with a complex linear closure.  Given the location of the remaining defect, shape of the defect and the proximity to free margins an O-T advancement flap was deemed most appropriate for complete closure of the defect.  Using a sterile surgical marker, an appropriate advancement flap was drawn incorporating the defect and placing the expected incisions within the relaxed skin tension lines where possible.    The area thus outlined was incised deep to adipose tissue with a #15 scalpel blade.  The skin margins were undermined to an appropriate distance in all directions utilizing iris scissors.
Complex Repair And O-L Flap Text: The defect edges were debeveled with a #15 scalpel blade.  The primary defect was closed partially with a complex linear closure.  Given the location of the remaining defect, shape of the defect and the proximity to free margins an O-L flap was deemed most appropriate for complete closure of the defect.  Using a sterile surgical marker, an appropriate flap was drawn incorporating the defect and placing the expected incisions within the relaxed skin tension lines where possible.    The area thus outlined was incised deep to adipose tissue with a #15 scalpel blade.  The skin margins were undermined to an appropriate distance in all directions utilizing iris scissors.
Complex Repair And Bilobe Flap Text: The defect edges were debeveled with a #15 scalpel blade.  The primary defect was closed partially with a complex linear closure.  Given the location of the remaining defect, shape of the defect and the proximity to free margins a bilobe flap was deemed most appropriate for complete closure of the defect.  Using a sterile surgical marker, an appropriate advancement flap was drawn incorporating the defect and placing the expected incisions within the relaxed skin tension lines where possible.    The area thus outlined was incised deep to adipose tissue with a #15 scalpel blade.  The skin margins were undermined to an appropriate distance in all directions utilizing iris scissors.
Complex Repair And Melolabial Flap Text: The defect edges were debeveled with a #15 scalpel blade.  The primary defect was closed partially with a complex linear closure.  Given the location of the remaining defect, shape of the defect and the proximity to free margins a melolabial flap was deemed most appropriate for complete closure of the defect.  Using a sterile surgical marker, an appropriate advancement flap was drawn incorporating the defect and placing the expected incisions within the relaxed skin tension lines where possible.    The area thus outlined was incised deep to adipose tissue with a #15 scalpel blade.  The skin margins were undermined to an appropriate distance in all directions utilizing iris scissors.
Complex Repair And Rotation Flap Text: The defect edges were debeveled with a #15 scalpel blade.  The primary defect was closed partially with a complex linear closure.  Given the location of the remaining defect, shape of the defect and the proximity to free margins a rotation flap was deemed most appropriate for complete closure of the defect.  Using a sterile surgical marker, an appropriate advancement flap was drawn incorporating the defect and placing the expected incisions within the relaxed skin tension lines where possible.    The area thus outlined was incised deep to adipose tissue with a #15 scalpel blade.  The skin margins were undermined to an appropriate distance in all directions utilizing iris scissors.
Complex Repair And Rhombic Flap Text: The defect edges were debeveled with a #15 scalpel blade.  The primary defect was closed partially with a complex linear closure.  Given the location of the remaining defect, shape of the defect and the proximity to free margins a rhombic flap was deemed most appropriate for complete closure of the defect.  Using a sterile surgical marker, an appropriate advancement flap was drawn incorporating the defect and placing the expected incisions within the relaxed skin tension lines where possible.    The area thus outlined was incised deep to adipose tissue with a #15 scalpel blade.  The skin margins were undermined to an appropriate distance in all directions utilizing iris scissors.
Complex Repair And Transposition Flap Text: The defect edges were debeveled with a #15 scalpel blade.  The primary defect was closed partially with a complex linear closure.  Given the location of the remaining defect, shape of the defect and the proximity to free margins a transposition flap was deemed most appropriate for complete closure of the defect.  Using a sterile surgical marker, an appropriate advancement flap was drawn incorporating the defect and placing the expected incisions within the relaxed skin tension lines where possible.    The area thus outlined was incised deep to adipose tissue with a #15 scalpel blade.  The skin margins were undermined to an appropriate distance in all directions utilizing iris scissors.
Complex Repair And V-Y Plasty Text: The defect edges were debeveled with a #15 scalpel blade.  The primary defect was closed partially with a complex linear closure.  Given the location of the remaining defect, shape of the defect and the proximity to free margins a V-Y plasty was deemed most appropriate for complete closure of the defect.  Using a sterile surgical marker, an appropriate advancement flap was drawn incorporating the defect and placing the expected incisions within the relaxed skin tension lines where possible.    The area thus outlined was incised deep to adipose tissue with a #15 scalpel blade.  The skin margins were undermined to an appropriate distance in all directions utilizing iris scissors.
Complex Repair And M Plasty Text: The defect edges were debeveled with a #15 scalpel blade.  The primary defect was closed partially with a complex linear closure.  Given the location of the remaining defect, shape of the defect and the proximity to free margins an M plasty was deemed most appropriate for complete closure of the defect.  Using a sterile surgical marker, an appropriate advancement flap was drawn incorporating the defect and placing the expected incisions within the relaxed skin tension lines where possible.    The area thus outlined was incised deep to adipose tissue with a #15 scalpel blade.  The skin margins were undermined to an appropriate distance in all directions utilizing iris scissors.
Complex Repair And Double M Plasty Text: The defect edges were debeveled with a #15 scalpel blade.  The primary defect was closed partially with a complex linear closure.  Given the location of the remaining defect, shape of the defect and the proximity to free margins a double M plasty was deemed most appropriate for complete closure of the defect.  Using a sterile surgical marker, an appropriate advancement flap was drawn incorporating the defect and placing the expected incisions within the relaxed skin tension lines where possible.    The area thus outlined was incised deep to adipose tissue with a #15 scalpel blade.  The skin margins were undermined to an appropriate distance in all directions utilizing iris scissors.
Complex Repair And W Plasty Text: The defect edges were debeveled with a #15 scalpel blade.  The primary defect was closed partially with a complex linear closure.  Given the location of the remaining defect, shape of the defect and the proximity to free margins a W plasty was deemed most appropriate for complete closure of the defect.  Using a sterile surgical marker, an appropriate advancement flap was drawn incorporating the defect and placing the expected incisions within the relaxed skin tension lines where possible.    The area thus outlined was incised deep to adipose tissue with a #15 scalpel blade.  The skin margins were undermined to an appropriate distance in all directions utilizing iris scissors.
Complex Repair And Z Plasty Text: The defect edges were debeveled with a #15 scalpel blade.  The primary defect was closed partially with a complex linear closure.  Given the location of the remaining defect, shape of the defect and the proximity to free margins a Z plasty was deemed most appropriate for complete closure of the defect.  Using a sterile surgical marker, an appropriate advancement flap was drawn incorporating the defect and placing the expected incisions within the relaxed skin tension lines where possible.    The area thus outlined was incised deep to adipose tissue with a #15 scalpel blade.  The skin margins were undermined to an appropriate distance in all directions utilizing iris scissors.
Complex Repair And Dorsal Nasal Flap Text: The defect edges were debeveled with a #15 scalpel blade.  The primary defect was closed partially with a complex linear closure.  Given the location of the remaining defect, shape of the defect and the proximity to free margins a dorsal nasal flap was deemed most appropriate for complete closure of the defect.  Using a sterile surgical marker, an appropriate flap was drawn incorporating the defect and placing the expected incisions within the relaxed skin tension lines where possible.    The area thus outlined was incised deep to adipose tissue with a #15 scalpel blade.  The skin margins were undermined to an appropriate distance in all directions utilizing iris scissors.
Complex Repair And Ftsg Text: The defect edges were debeveled with a #15 scalpel blade.  The primary defect was closed partially with a complex linear closure.  Given the location of the defect, shape of the defect and the proximity to free margins a full thickness skin graft was deemed most appropriate to repair the remaining defect.  The graft was trimmed to fit the size of the remaining defect.  The graft was then placed in the primary defect, oriented appropriately, and sutured into place.
Complex Repair And Burow's Graft Text: The defect edges were debeveled with a #15 scalpel blade.  The primary defect was closed partially with a complex linear closure.  Given the location of the defect, shape of the defect, the proximity to free margins and the presence of a standing cone deformity a Burow's graft was deemed most appropriate to repair the remaining defect.  The graft was trimmed to fit the size of the remaining defect.  The graft was then placed in the primary defect, oriented appropriately, and sutured into place.
Complex Repair And Split-Thickness Skin Graft Text: The defect edges were debeveled with a #15 scalpel blade.  The primary defect was closed partially with a complex linear closure.  Given the location of the defect, shape of the defect and the proximity to free margins a split thickness skin graft was deemed most appropriate to repair the remaining defect.  The graft was trimmed to fit the size of the remaining defect.  The graft was then placed in the primary defect, oriented appropriately, and sutured into place.
Complex Repair And Epidermal Autograft Text: The defect edges were debeveled with a #15 scalpel blade.  The primary defect was closed partially with a complex linear closure.  Given the location of the defect, shape of the defect and the proximity to free margins an epidermal autograft was deemed most appropriate to repair the remaining defect.  The graft was trimmed to fit the size of the remaining defect.  The graft was then placed in the primary defect, oriented appropriately, and sutured into place.
Complex Repair And Dermal Autograft Text: The defect edges were debeveled with a #15 scalpel blade.  The primary defect was closed partially with a complex linear closure.  Given the location of the defect, shape of the defect and the proximity to free margins an dermal autograft was deemed most appropriate to repair the remaining defect.  The graft was trimmed to fit the size of the remaining defect.  The graft was then placed in the primary defect, oriented appropriately, and sutured into place.
Complex Repair And Tissue Cultured Epidermal Autograft Text: The defect edges were debeveled with a #15 scalpel blade.  The primary defect was closed partially with a complex linear closure.  Given the location of the defect, shape of the defect and the proximity to free margins an tissue cultured epidermal autograft was deemed most appropriate to repair the remaining defect.  The graft was trimmed to fit the size of the remaining defect.  The graft was then placed in the primary defect, oriented appropriately, and sutured into place.
Complex Repair And Xenograft Text: The defect edges were debeveled with a #15 scalpel blade.  The primary defect was closed partially with a complex linear closure.  Given the location of the defect, shape of the defect and the proximity to free margins a xenograft was deemed most appropriate to repair the remaining defect.  The graft was trimmed to fit the size of the remaining defect.  The graft was then placed in the primary defect, oriented appropriately, and sutured into place.
Complex Repair And Skin Substitute Graft Text: The defect edges were debeveled with a #15 scalpel blade.  The primary defect was closed partially with a complex linear closure.  Given the location of the remaining defect, shape of the defect and the proximity to free margins a skin substitute graft was deemed most appropriate to repair the remaining defect.  The graft was trimmed to fit the size of the remaining defect.  The graft was then placed in the primary defect, oriented appropriately, and sutured into place.
Path Notes (To The Dermatopathologist): Please check margins.
Consent was obtained from the patient. The risks and benefits to therapy were discussed in detail. Specifically, the risks of infection, scarring, bleeding, prolonged wound healing, incomplete removal, allergy to anesthesia, nerve injury and recurrence were addressed. Prior to the procedure, the treatment site was clearly identified and confirmed by the patient. All components of Universal Protocol/PAUSE Rule completed.
Post-Care Instructions: I reviewed with the patient in detail post-care instructions. Patient is not to engage in any heavy lifting, exercise, or swimming for the next 14 days. Should the patient develop any fevers, chills, bleeding, severe pain patient will contact the office immediately.
Home Suture Removal Text: Patient was provided a home suture removal kit and will remove their sutures at home.  If they have any questions or difficulties they will call the office.
Where Do You Want The Question To Include Opioid Counseling Located?: Case Summary Tab
Billing Type: Third-Party Bill
Information: Selecting Yes will display possible errors in your note based on the variables you have selected. This validation is only offered as a suggestion for you. PLEASE NOTE THAT THE VALIDATION TEXT WILL BE REMOVED WHEN YOU FINALIZE YOUR NOTE. IF YOU WANT TO FAX A PRELIMINARY NOTE YOU WILL NEED TO TOGGLE THIS TO 'NO' IF YOU DO NOT WANT IT IN YOUR FAXED NOTE.

## 2022-06-01 NOTE — PROCEDURE: MIPS QUALITY
Quality 431: Preventive Care And Screening: Unhealthy Alcohol Use - Screening: Patient not identified as an unhealthy alcohol user when screened for unhealthy alcohol use using a systematic screening method
Quality 110: Preventive Care And Screening: Influenza Immunization: Influenza immunization was not ordered or administered, reason not given
Quality 226: Preventive Care And Screening: Tobacco Use: Screening And Cessation Intervention: Patient screened for tobacco use and is an ex/non-smoker
Quality 111:Pneumonia Vaccination Status For Older Adults: Pneumococcal vaccine administered on or after patient’s 60th birthday and before the end of the measurement period
Detail Level: Detailed
Quality 130: Documentation Of Current Medications In The Medical Record: Current Medications Documented

## 2022-06-15 ENCOUNTER — APPOINTMENT (RX ONLY)
Dept: URBAN - METROPOLITAN AREA CLINIC 6 | Facility: CLINIC | Age: 67
Setting detail: DERMATOLOGY
End: 2022-06-15

## 2022-06-15 DIAGNOSIS — Z48.02 ENCOUNTER FOR REMOVAL OF SUTURES: ICD-10-CM

## 2022-06-15 PROCEDURE — 99024 POSTOP FOLLOW-UP VISIT: CPT

## 2022-06-15 PROCEDURE — ? SUTURE REMOVAL (GLOBAL PERIOD)

## 2022-06-15 ASSESSMENT — LOCATION ZONE DERM: LOCATION ZONE: NECK

## 2022-06-15 ASSESSMENT — LOCATION SIMPLE DESCRIPTION DERM: LOCATION SIMPLE: POSTERIOR NECK

## 2022-06-15 ASSESSMENT — LOCATION DETAILED DESCRIPTION DERM: LOCATION DETAILED: RIGHT LATERAL TRAPEZIAL NECK

## 2022-08-03 ENCOUNTER — APPOINTMENT (RX ONLY)
Dept: URBAN - METROPOLITAN AREA CLINIC 6 | Facility: CLINIC | Age: 67
Setting detail: DERMATOLOGY
End: 2022-08-03

## 2022-08-03 DIAGNOSIS — L57.0 ACTINIC KERATOSIS: ICD-10-CM

## 2022-08-03 PROCEDURE — 17003 DESTRUCT PREMALG LES 2-14: CPT

## 2022-08-03 PROCEDURE — ? COUNSELING

## 2022-08-03 PROCEDURE — 17000 DESTRUCT PREMALG LESION: CPT

## 2022-08-03 PROCEDURE — ? LIQUID NITROGEN

## 2022-08-03 ASSESSMENT — LOCATION DETAILED DESCRIPTION DERM
LOCATION DETAILED: LEFT INFERIOR LATERAL MALAR CHEEK
LOCATION DETAILED: LEFT MEDIAL ZYGOMA
LOCATION DETAILED: LEFT INFERIOR MEDIAL MALAR CHEEK

## 2022-08-03 ASSESSMENT — LOCATION SIMPLE DESCRIPTION DERM
LOCATION SIMPLE: LEFT ZYGOMA
LOCATION SIMPLE: LEFT CHEEK

## 2022-08-03 ASSESSMENT — LOCATION ZONE DERM: LOCATION ZONE: FACE

## 2022-08-03 NOTE — PROCEDURE: LIQUID NITROGEN
Show Aperture Variable?: Yes
Consent: The patient's consent was obtained including but not limited to risks of crusting, scabbing, blistering, scarring, darker or lighter pigmentary change, recurrence, incomplete removal and infection.
Render Post-Care Instructions In Note?: no
Detail Level: Zone
Post-Care Instructions: I reviewed with the patient in detail post-care instructions. Patient is to wear sunprotection, and avoid picking at any of the treated lesions. Pt may apply Vaseline to crusted or scabbing areas.
Duration Of Freeze Thaw-Cycle (Seconds): 8
Number Of Freeze-Thaw Cycles: 2 freeze-thaw cycles

## 2022-09-27 ENCOUNTER — PRE-ADMISSION TESTING (OUTPATIENT)
Dept: ADMISSIONS | Facility: MEDICAL CENTER | Age: 67
End: 2022-09-27
Attending: NEUROLOGICAL SURGERY
Payer: MEDICARE

## 2022-09-27 ENCOUNTER — HOSPITAL ENCOUNTER (OUTPATIENT)
Dept: RADIOLOGY | Facility: MEDICAL CENTER | Age: 67
End: 2022-09-27
Attending: NEUROLOGICAL SURGERY
Payer: MEDICARE

## 2022-09-27 DIAGNOSIS — R94.31 NONSPECIFIC ABNORMAL ELECTROCARDIOGRAM (ECG) (EKG): ICD-10-CM

## 2022-09-27 DIAGNOSIS — Z01.811 PRE-OPERATIVE RESPIRATORY EXAMINATION: ICD-10-CM

## 2022-09-27 DIAGNOSIS — R82.90 NONSPECIFIC FINDING ON EXAMINATION OF URINE: ICD-10-CM

## 2022-09-27 DIAGNOSIS — Z01.810 PRE-OPERATIVE CARDIOVASCULAR EXAMINATION: ICD-10-CM

## 2022-09-27 DIAGNOSIS — M54.16 LUMBAR RADICULOPATHY: ICD-10-CM

## 2022-09-27 DIAGNOSIS — Z01.812 PRE-OPERATIVE LABORATORY EXAMINATION: ICD-10-CM

## 2022-09-27 DIAGNOSIS — M51.26 DISPLACEMENT OF LUMBAR INTERVERTEBRAL DISC WITHOUT MYELOPATHY: ICD-10-CM

## 2022-09-27 DIAGNOSIS — R79.1 ABNORMAL COAGULATION PROFILE: ICD-10-CM

## 2022-09-27 LAB
ANION GAP SERPL CALC-SCNC: 8 MMOL/L (ref 7–16)
APPEARANCE UR: CLEAR
APTT PPP: 26.7 SEC (ref 24.7–36)
BASOPHILS # BLD AUTO: 0.8 % (ref 0–1.8)
BASOPHILS # BLD: 0.06 K/UL (ref 0–0.12)
BILIRUB UR QL STRIP.AUTO: NEGATIVE
BUN SERPL-MCNC: 23 MG/DL (ref 8–22)
CALCIUM SERPL-MCNC: 9.8 MG/DL (ref 8.5–10.5)
CHLORIDE SERPL-SCNC: 104 MMOL/L (ref 96–112)
CO2 SERPL-SCNC: 26 MMOL/L (ref 20–33)
COLOR UR: NORMAL
CREAT SERPL-MCNC: 1.27 MG/DL (ref 0.5–1.4)
EKG IMPRESSION: NORMAL
EOSINOPHIL # BLD AUTO: 0.19 K/UL (ref 0–0.51)
EOSINOPHIL NFR BLD: 2.5 % (ref 0–6.9)
ERYTHROCYTE [DISTWIDTH] IN BLOOD BY AUTOMATED COUNT: 44.3 FL (ref 35.9–50)
EST. AVERAGE GLUCOSE BLD GHB EST-MCNC: 131 MG/DL
GFR SERPLBLD CREATININE-BSD FMLA CKD-EPI: 62 ML/MIN/1.73 M 2
GLUCOSE SERPL-MCNC: 82 MG/DL (ref 65–99)
GLUCOSE UR STRIP.AUTO-MCNC: NEGATIVE MG/DL
HBA1C MFR BLD: 6.2 % (ref 4–5.6)
HCT VFR BLD AUTO: 50.1 % (ref 42–52)
HGB BLD-MCNC: 16.5 G/DL (ref 14–18)
IMM GRANULOCYTES # BLD AUTO: 0.03 K/UL (ref 0–0.11)
IMM GRANULOCYTES NFR BLD AUTO: 0.4 % (ref 0–0.9)
INR PPP: 1.06 (ref 0.87–1.13)
KETONES UR STRIP.AUTO-MCNC: NEGATIVE MG/DL
LEUKOCYTE ESTERASE UR QL STRIP.AUTO: NEGATIVE
LYMPHOCYTES # BLD AUTO: 1.84 K/UL (ref 1–4.8)
LYMPHOCYTES NFR BLD: 23.7 % (ref 22–41)
MCH RBC QN AUTO: 29.5 PG (ref 27–33)
MCHC RBC AUTO-ENTMCNC: 32.9 G/DL (ref 33.7–35.3)
MCV RBC AUTO: 89.5 FL (ref 81.4–97.8)
MICRO URNS: NORMAL
MONOCYTES # BLD AUTO: 0.61 K/UL (ref 0–0.85)
MONOCYTES NFR BLD AUTO: 7.9 % (ref 0–13.4)
NEUTROPHILS # BLD AUTO: 5.02 K/UL (ref 1.82–7.42)
NEUTROPHILS NFR BLD: 64.7 % (ref 44–72)
NITRITE UR QL STRIP.AUTO: NEGATIVE
NRBC # BLD AUTO: 0 K/UL
NRBC BLD-RTO: 0 /100 WBC
PH UR STRIP.AUTO: 5.5 [PH] (ref 5–8)
PLATELET # BLD AUTO: 233 K/UL (ref 164–446)
PMV BLD AUTO: 9.9 FL (ref 9–12.9)
POTASSIUM SERPL-SCNC: 4.5 MMOL/L (ref 3.6–5.5)
PROT UR QL STRIP: NEGATIVE MG/DL
PROTHROMBIN TIME: 13.7 SEC (ref 12–14.6)
RBC # BLD AUTO: 5.6 M/UL (ref 4.7–6.1)
RBC UR QL AUTO: NEGATIVE
SODIUM SERPL-SCNC: 138 MMOL/L (ref 135–145)
SP GR UR STRIP.AUTO: 1.03
UROBILINOGEN UR STRIP.AUTO-MCNC: 0.2 MG/DL
WBC # BLD AUTO: 7.8 K/UL (ref 4.8–10.8)

## 2022-09-27 PROCEDURE — 80048 BASIC METABOLIC PNL TOTAL CA: CPT

## 2022-09-27 PROCEDURE — 83036 HEMOGLOBIN GLYCOSYLATED A1C: CPT

## 2022-09-27 PROCEDURE — 71046 X-RAY EXAM CHEST 2 VIEWS: CPT

## 2022-09-27 PROCEDURE — 36415 COLL VENOUS BLD VENIPUNCTURE: CPT

## 2022-09-27 PROCEDURE — 93010 ELECTROCARDIOGRAM REPORT: CPT | Performed by: INTERNAL MEDICINE

## 2022-09-27 PROCEDURE — 72110 X-RAY EXAM L-2 SPINE 4/>VWS: CPT

## 2022-09-27 PROCEDURE — 85730 THROMBOPLASTIN TIME PARTIAL: CPT

## 2022-09-27 PROCEDURE — 93005 ELECTROCARDIOGRAM TRACING: CPT

## 2022-09-27 PROCEDURE — 81003 URINALYSIS AUTO W/O SCOPE: CPT

## 2022-09-27 PROCEDURE — 85025 COMPLETE CBC W/AUTO DIFF WBC: CPT

## 2022-09-27 PROCEDURE — 85610 PROTHROMBIN TIME: CPT

## 2022-09-27 RX ORDER — IBUPROFEN 600 MG/1
600 TABLET ORAL PRN
COMMUNITY
End: 2023-06-21

## 2022-09-27 RX ORDER — CELECOXIB 200 MG/1
CAPSULE ORAL
COMMUNITY
Start: 2022-07-11 | End: 2022-09-27

## 2022-09-27 RX ORDER — TRAMADOL HYDROCHLORIDE 50 MG/1
TABLET ORAL PRN
COMMUNITY
End: 2023-06-21

## 2022-09-27 RX ORDER — METHOCARBAMOL 750 MG/1
TABLET, FILM COATED ORAL
Status: ON HOLD | COMMUNITY
End: 2022-10-11

## 2022-10-11 ENCOUNTER — APPOINTMENT (OUTPATIENT)
Dept: RADIOLOGY | Facility: MEDICAL CENTER | Age: 67
End: 2022-10-11
Attending: NEUROLOGICAL SURGERY
Payer: MEDICARE

## 2022-10-11 ENCOUNTER — HOSPITAL ENCOUNTER (OUTPATIENT)
Facility: MEDICAL CENTER | Age: 67
End: 2022-10-11
Attending: NEUROLOGICAL SURGERY | Admitting: NEUROLOGICAL SURGERY
Payer: MEDICARE

## 2022-10-11 ENCOUNTER — ANESTHESIA (OUTPATIENT)
Dept: SURGERY | Facility: MEDICAL CENTER | Age: 67
End: 2022-10-11
Payer: MEDICARE

## 2022-10-11 ENCOUNTER — ANESTHESIA EVENT (OUTPATIENT)
Dept: SURGERY | Facility: MEDICAL CENTER | Age: 67
End: 2022-10-11
Payer: MEDICARE

## 2022-10-11 VITALS
RESPIRATION RATE: 16 BRPM | OXYGEN SATURATION: 96 % | SYSTOLIC BLOOD PRESSURE: 150 MMHG | TEMPERATURE: 97.2 F | HEART RATE: 80 BPM | HEIGHT: 69 IN | WEIGHT: 252.65 LBS | BODY MASS INDEX: 37.42 KG/M2 | DIASTOLIC BLOOD PRESSURE: 73 MMHG

## 2022-10-11 DIAGNOSIS — G89.18 PAIN, ACUTE POSTOPERATIVE: ICD-10-CM

## 2022-10-11 PROCEDURE — 700102 HCHG RX REV CODE 250 W/ 637 OVERRIDE(OP): Performed by: ANESTHESIOLOGY

## 2022-10-11 PROCEDURE — 700111 HCHG RX REV CODE 636 W/ 250 OVERRIDE (IP): Performed by: ANESTHESIOLOGY

## 2022-10-11 PROCEDURE — 95940 IONM IN OPERATNG ROOM 15 MIN: CPT | Mod: XU | Performed by: NEUROLOGICAL SURGERY

## 2022-10-11 PROCEDURE — 700101 HCHG RX REV CODE 250: Performed by: ANESTHESIOLOGY

## 2022-10-11 PROCEDURE — 700101 HCHG RX REV CODE 250: Performed by: NEUROLOGICAL SURGERY

## 2022-10-11 PROCEDURE — C9290 INJ, BUPIVACAINE LIPOSOME: HCPCS | Performed by: NEUROLOGICAL SURGERY

## 2022-10-11 PROCEDURE — 95937 NEUROMUSCULAR JUNCTION TEST: CPT | Mod: XU | Performed by: NEUROLOGICAL SURGERY

## 2022-10-11 PROCEDURE — 160046 HCHG PACU - 1ST 60 MINS PHASE II: Performed by: NEUROLOGICAL SURGERY

## 2022-10-11 PROCEDURE — 160025 RECOVERY II MINUTES (STATS): Performed by: NEUROLOGICAL SURGERY

## 2022-10-11 PROCEDURE — 700105 HCHG RX REV CODE 258: Performed by: NEUROLOGICAL SURGERY

## 2022-10-11 PROCEDURE — 160041 HCHG SURGERY MINUTES - EA ADDL 1 MIN LEVEL 4: Performed by: NEUROLOGICAL SURGERY

## 2022-10-11 PROCEDURE — 160048 HCHG OR STATISTICAL LEVEL 1-5: Performed by: NEUROLOGICAL SURGERY

## 2022-10-11 PROCEDURE — 95955 EEG DURING SURGERY: CPT | Mod: XU | Performed by: NEUROLOGICAL SURGERY

## 2022-10-11 PROCEDURE — A9270 NON-COVERED ITEM OR SERVICE: HCPCS | Performed by: ANESTHESIOLOGY

## 2022-10-11 PROCEDURE — 95938 SOMATOSENSORY TESTING: CPT | Mod: XU | Performed by: NEUROLOGICAL SURGERY

## 2022-10-11 PROCEDURE — 160035 HCHG PACU - 1ST 60 MINS PHASE I: Performed by: NEUROLOGICAL SURGERY

## 2022-10-11 PROCEDURE — 160009 HCHG ANES TIME/MIN: Performed by: NEUROLOGICAL SURGERY

## 2022-10-11 PROCEDURE — C1755 CATHETER, INTRASPINAL: HCPCS | Performed by: NEUROLOGICAL SURGERY

## 2022-10-11 PROCEDURE — 95861 NEEDLE EMG 2 EXTREMITIES: CPT | Mod: XU | Performed by: NEUROLOGICAL SURGERY

## 2022-10-11 PROCEDURE — 72020 X-RAY EXAM OF SPINE 1 VIEW: CPT

## 2022-10-11 PROCEDURE — 00630 ANES PX LUMBAR REGION NOS: CPT | Performed by: ANESTHESIOLOGY

## 2022-10-11 PROCEDURE — 160002 HCHG RECOVERY MINUTES (STAT): Performed by: NEUROLOGICAL SURGERY

## 2022-10-11 PROCEDURE — 700111 HCHG RX REV CODE 636 W/ 250 OVERRIDE (IP): Performed by: NEUROLOGICAL SURGERY

## 2022-10-11 PROCEDURE — 160029 HCHG SURGERY MINUTES - 1ST 30 MINS LEVEL 4: Performed by: NEUROLOGICAL SURGERY

## 2022-10-11 RX ORDER — HALOPERIDOL 5 MG/ML
1 INJECTION INTRAMUSCULAR
Status: DISCONTINUED | OUTPATIENT
Start: 2022-10-11 | End: 2022-10-11 | Stop reason: HOSPADM

## 2022-10-11 RX ORDER — BUPIVACAINE HYDROCHLORIDE AND EPINEPHRINE 5; 5 MG/ML; UG/ML
INJECTION, SOLUTION EPIDURAL; INTRACAUDAL; PERINEURAL
Status: DISCONTINUED | OUTPATIENT
Start: 2022-10-11 | End: 2022-10-11 | Stop reason: HOSPADM

## 2022-10-11 RX ORDER — DIPHENHYDRAMINE HYDROCHLORIDE 50 MG/ML
12.5 INJECTION INTRAMUSCULAR; INTRAVENOUS
Status: DISCONTINUED | OUTPATIENT
Start: 2022-10-11 | End: 2022-10-11 | Stop reason: HOSPADM

## 2022-10-11 RX ORDER — ROCURONIUM BROMIDE 10 MG/ML
INJECTION, SOLUTION INTRAVENOUS PRN
Status: DISCONTINUED | OUTPATIENT
Start: 2022-10-11 | End: 2022-10-11 | Stop reason: SURG

## 2022-10-11 RX ORDER — ACETAMINOPHEN 500 MG
1000 TABLET ORAL ONCE
Status: COMPLETED | OUTPATIENT
Start: 2022-10-11 | End: 2022-10-11

## 2022-10-11 RX ORDER — VANCOMYCIN HYDROCHLORIDE 1 G/20ML
INJECTION, POWDER, LYOPHILIZED, FOR SOLUTION INTRAVENOUS
Status: COMPLETED | OUTPATIENT
Start: 2022-10-11 | End: 2022-10-11

## 2022-10-11 RX ORDER — HYDROMORPHONE HYDROCHLORIDE 1 MG/ML
0.1 INJECTION, SOLUTION INTRAMUSCULAR; INTRAVENOUS; SUBCUTANEOUS
Status: DISCONTINUED | OUTPATIENT
Start: 2022-10-11 | End: 2022-10-11 | Stop reason: HOSPADM

## 2022-10-11 RX ORDER — PHENYLEPHRINE HCL IN 0.9% NACL 0.5 MG/5ML
SYRINGE (ML) INTRAVENOUS PRN
Status: DISCONTINUED | OUTPATIENT
Start: 2022-10-11 | End: 2022-10-11 | Stop reason: SURG

## 2022-10-11 RX ORDER — HYDROCODONE BITARTRATE AND ACETAMINOPHEN 10; 325 MG/1; MG/1
1 TABLET ORAL EVERY 4 HOURS PRN
Qty: 84 TABLET | Refills: 0 | Status: SHIPPED | OUTPATIENT
Start: 2022-10-11 | End: 2022-10-25

## 2022-10-11 RX ORDER — OXYCODONE HCL 5 MG/5 ML
10 SOLUTION, ORAL ORAL
Status: COMPLETED | OUTPATIENT
Start: 2022-10-11 | End: 2022-10-11

## 2022-10-11 RX ORDER — HYDROMORPHONE HYDROCHLORIDE 1 MG/ML
0.2 INJECTION, SOLUTION INTRAMUSCULAR; INTRAVENOUS; SUBCUTANEOUS
Status: DISCONTINUED | OUTPATIENT
Start: 2022-10-11 | End: 2022-10-11 | Stop reason: HOSPADM

## 2022-10-11 RX ORDER — SODIUM CHLORIDE, SODIUM LACTATE, POTASSIUM CHLORIDE, CALCIUM CHLORIDE 600; 310; 30; 20 MG/100ML; MG/100ML; MG/100ML; MG/100ML
INJECTION, SOLUTION INTRAVENOUS CONTINUOUS
Status: DISCONTINUED | OUTPATIENT
Start: 2022-10-11 | End: 2022-10-11

## 2022-10-11 RX ORDER — SODIUM CHLORIDE, SODIUM LACTATE, POTASSIUM CHLORIDE, CALCIUM CHLORIDE 600; 310; 30; 20 MG/100ML; MG/100ML; MG/100ML; MG/100ML
INJECTION, SOLUTION INTRAVENOUS CONTINUOUS
Status: DISCONTINUED | OUTPATIENT
Start: 2022-10-11 | End: 2022-10-11 | Stop reason: HOSPADM

## 2022-10-11 RX ORDER — GLYCOPYRROLATE 0.2 MG/ML
INJECTION INTRAMUSCULAR; INTRAVENOUS PRN
Status: DISCONTINUED | OUTPATIENT
Start: 2022-10-11 | End: 2022-10-11 | Stop reason: SURG

## 2022-10-11 RX ORDER — DEXAMETHASONE SODIUM PHOSPHATE 4 MG/ML
INJECTION, SOLUTION INTRA-ARTICULAR; INTRALESIONAL; INTRAMUSCULAR; INTRAVENOUS; SOFT TISSUE PRN
Status: DISCONTINUED | OUTPATIENT
Start: 2022-10-11 | End: 2022-10-11 | Stop reason: SURG

## 2022-10-11 RX ORDER — NEOSTIGMINE METHYLSULFATE 1 MG/ML
INJECTION, SOLUTION INTRAVENOUS PRN
Status: DISCONTINUED | OUTPATIENT
Start: 2022-10-11 | End: 2022-10-11 | Stop reason: SURG

## 2022-10-11 RX ORDER — HYDROMORPHONE HYDROCHLORIDE 1 MG/ML
0.4 INJECTION, SOLUTION INTRAMUSCULAR; INTRAVENOUS; SUBCUTANEOUS
Status: DISCONTINUED | OUTPATIENT
Start: 2022-10-11 | End: 2022-10-11 | Stop reason: HOSPADM

## 2022-10-11 RX ORDER — HYDROMORPHONE HYDROCHLORIDE 2 MG/ML
INJECTION, SOLUTION INTRAMUSCULAR; INTRAVENOUS; SUBCUTANEOUS PRN
Status: DISCONTINUED | OUTPATIENT
Start: 2022-10-11 | End: 2022-10-11 | Stop reason: SURG

## 2022-10-11 RX ORDER — MIDAZOLAM HYDROCHLORIDE 1 MG/ML
1 INJECTION INTRAMUSCULAR; INTRAVENOUS
Status: DISCONTINUED | OUTPATIENT
Start: 2022-10-11 | End: 2022-10-11 | Stop reason: HOSPADM

## 2022-10-11 RX ORDER — METOPROLOL TARTRATE 1 MG/ML
1 INJECTION, SOLUTION INTRAVENOUS
Status: DISCONTINUED | OUTPATIENT
Start: 2022-10-11 | End: 2022-10-11 | Stop reason: HOSPADM

## 2022-10-11 RX ORDER — HYDRALAZINE HYDROCHLORIDE 20 MG/ML
5 INJECTION INTRAMUSCULAR; INTRAVENOUS
Status: DISCONTINUED | OUTPATIENT
Start: 2022-10-11 | End: 2022-10-11 | Stop reason: HOSPADM

## 2022-10-11 RX ORDER — ONDANSETRON 2 MG/ML
4 INJECTION INTRAMUSCULAR; INTRAVENOUS
Status: DISCONTINUED | OUTPATIENT
Start: 2022-10-11 | End: 2022-10-11 | Stop reason: HOSPADM

## 2022-10-11 RX ORDER — OXYCODONE HCL 5 MG/5 ML
5 SOLUTION, ORAL ORAL
Status: COMPLETED | OUTPATIENT
Start: 2022-10-11 | End: 2022-10-11

## 2022-10-11 RX ORDER — ONDANSETRON 2 MG/ML
INJECTION INTRAMUSCULAR; INTRAVENOUS PRN
Status: DISCONTINUED | OUTPATIENT
Start: 2022-10-11 | End: 2022-10-11 | Stop reason: SURG

## 2022-10-11 RX ORDER — CEFAZOLIN SODIUM 1 G/3ML
INJECTION, POWDER, FOR SOLUTION INTRAMUSCULAR; INTRAVENOUS
Status: DISCONTINUED | OUTPATIENT
Start: 2022-10-11 | End: 2022-10-11 | Stop reason: HOSPADM

## 2022-10-11 RX ORDER — CEFAZOLIN SODIUM 1 G/3ML
INJECTION, POWDER, FOR SOLUTION INTRAMUSCULAR; INTRAVENOUS PRN
Status: DISCONTINUED | OUTPATIENT
Start: 2022-10-11 | End: 2022-10-11 | Stop reason: SURG

## 2022-10-11 RX ORDER — MIDAZOLAM HYDROCHLORIDE 1 MG/ML
INJECTION INTRAMUSCULAR; INTRAVENOUS PRN
Status: DISCONTINUED | OUTPATIENT
Start: 2022-10-11 | End: 2022-10-11 | Stop reason: SURG

## 2022-10-11 RX ORDER — SCOLOPAMINE TRANSDERMAL SYSTEM 1 MG/1
1 PATCH, EXTENDED RELEASE TRANSDERMAL
Status: DISCONTINUED | OUTPATIENT
Start: 2022-10-11 | End: 2022-10-11 | Stop reason: HOSPADM

## 2022-10-11 RX ORDER — LABETALOL HYDROCHLORIDE 5 MG/ML
5 INJECTION, SOLUTION INTRAVENOUS
Status: DISCONTINUED | OUTPATIENT
Start: 2022-10-11 | End: 2022-10-11 | Stop reason: HOSPADM

## 2022-10-11 RX ORDER — METHOCARBAMOL 750 MG/1
750 TABLET, FILM COATED ORAL 3 TIMES DAILY PRN
Qty: 90 TABLET | Refills: 0 | Status: SHIPPED
Start: 2022-10-11 | End: 2023-06-21

## 2022-10-11 RX ORDER — MEPERIDINE HYDROCHLORIDE 25 MG/ML
12.5 INJECTION INTRAMUSCULAR; INTRAVENOUS; SUBCUTANEOUS
Status: DISCONTINUED | OUTPATIENT
Start: 2022-10-11 | End: 2022-10-11 | Stop reason: HOSPADM

## 2022-10-11 RX ADMIN — HYDROMORPHONE HYDROCHLORIDE 0.5 MG: 2 INJECTION INTRAMUSCULAR; INTRAVENOUS; SUBCUTANEOUS at 13:44

## 2022-10-11 RX ADMIN — SODIUM CHLORIDE, POTASSIUM CHLORIDE, SODIUM LACTATE AND CALCIUM CHLORIDE: 600; 310; 30; 20 INJECTION, SOLUTION INTRAVENOUS at 14:01

## 2022-10-11 RX ADMIN — ROCURONIUM BROMIDE 40 MG: 10 INJECTION, SOLUTION INTRAVENOUS at 13:19

## 2022-10-11 RX ADMIN — GLYCOPYRROLATE 0.6 MG: 0.2 INJECTION INTRAMUSCULAR; INTRAVENOUS at 14:45

## 2022-10-11 RX ADMIN — PROPOFOL 200 MG: 10 INJECTION, EMULSION INTRAVENOUS at 13:19

## 2022-10-11 RX ADMIN — DEXAMETHASONE SODIUM PHOSPHATE 8 MG: 4 INJECTION, SOLUTION INTRA-ARTICULAR; INTRALESIONAL; INTRAMUSCULAR; INTRAVENOUS; SOFT TISSUE at 13:27

## 2022-10-11 RX ADMIN — OXYCODONE HYDROCHLORIDE 5 MG: 5 SOLUTION ORAL at 15:25

## 2022-10-11 RX ADMIN — EPHEDRINE SULFATE 10 MG: 50 INJECTION, SOLUTION INTRAVENOUS at 13:58

## 2022-10-11 RX ADMIN — GLYCOPYRROLATE 0.3 MG: 0.2 INJECTION INTRAMUSCULAR; INTRAVENOUS at 13:49

## 2022-10-11 RX ADMIN — SCOPOLAMINE 1 PATCH: 1.5 PATCH, EXTENDED RELEASE TRANSDERMAL at 11:32

## 2022-10-11 RX ADMIN — EPHEDRINE SULFATE 10 MG: 50 INJECTION, SOLUTION INTRAVENOUS at 13:47

## 2022-10-11 RX ADMIN — FENTANYL CITRATE 100 MCG: 50 INJECTION, SOLUTION INTRAMUSCULAR; INTRAVENOUS at 13:15

## 2022-10-11 RX ADMIN — HYDRALAZINE HYDROCHLORIDE 5 MG: 20 INJECTION INTRAMUSCULAR; INTRAVENOUS at 15:44

## 2022-10-11 RX ADMIN — FENTANYL CITRATE 50 MCG: 50 INJECTION, SOLUTION INTRAMUSCULAR; INTRAVENOUS at 14:04

## 2022-10-11 RX ADMIN — CEFAZOLIN 3 G: 330 INJECTION, POWDER, FOR SOLUTION INTRAMUSCULAR; INTRAVENOUS at 13:27

## 2022-10-11 RX ADMIN — SODIUM CHLORIDE, POTASSIUM CHLORIDE, SODIUM LACTATE AND CALCIUM CHLORIDE: 600; 310; 30; 20 INJECTION, SOLUTION INTRAVENOUS at 11:32

## 2022-10-11 RX ADMIN — ONDANSETRON 4 MG: 2 INJECTION INTRAMUSCULAR; INTRAVENOUS at 14:19

## 2022-10-11 RX ADMIN — NEOSTIGMINE METHYLSULFATE 3 MG: 1 INJECTION INTRAVENOUS at 14:45

## 2022-10-11 RX ADMIN — PROPOFOL 25 MG: 10 INJECTION, EMULSION INTRAVENOUS at 13:20

## 2022-10-11 RX ADMIN — Medication 100 MCG: at 13:59

## 2022-10-11 RX ADMIN — ACETAMINOPHEN 1000 MG: 500 TABLET ORAL at 11:32

## 2022-10-11 RX ADMIN — Medication 100 MCG: at 13:51

## 2022-10-11 RX ADMIN — FENTANYL CITRATE 25 MCG: 50 INJECTION, SOLUTION INTRAMUSCULAR; INTRAVENOUS at 15:25

## 2022-10-11 RX ADMIN — MIDAZOLAM HYDROCHLORIDE 1 MG: 1 INJECTION, SOLUTION INTRAMUSCULAR; INTRAVENOUS at 13:14

## 2022-10-11 ASSESSMENT — PAIN DESCRIPTION - PAIN TYPE
TYPE: SURGICAL PAIN

## 2022-10-11 ASSESSMENT — PAIN SCALES - GENERAL: PAIN_LEVEL: 2

## 2022-10-11 NOTE — ANESTHESIA PREPROCEDURE EVALUATION
Case: [id number] Date/Time: 10/11/22 1245    Procedure: MINIMALLY INVASIVE RIGHT LUMBAR 4-5 MICRODISCECTOMY    Pre-op diagnosis: LUMBAR DISC DISPLACEMENT, RADICULOPATHY    Location: [facility] OR 06 / SURGERY [facility]    Surgeons: Elijah Ray M.D.      68 yo male with medical problems    P Med Hx:  Daytime sleepiness  Snoring  Gasping for breath  Seasonal allergies  Urinary bladder disorder  RICHARD on CPAP  History of elevated glucose    P Surg Hx:  Dental implant  Shoulder Cyst Exc  Bladder tumor removal  No reported problems with previous anesthesia    Quit smoking 1990  No current EtOH use reported  THC use    NPO    Relevant Problems   No relevant active problems       Physical Exam    Airway   Mallampati: II  TM distance: >3 FB  Neck ROM: full       Cardiovascular - normal exam  Rhythm: regular  Rate: normal  (-) murmur     Dental - normal exam           Pulmonary - normal exam  Breath sounds clear to auscultation     Abdominal    Neurological - normal exam                 Anesthesia Plan    ASA 2       Plan - general       Airway plan will be ETT    (Prone)      Induction: intravenous    Postoperative Plan: Postoperative administration of opioids is intended.    Pertinent diagnostic labs and testing reviewed    Informed Consent:    Anesthetic plan and risks discussed with patient.    Use of blood products discussed with: patient whom consented to blood products.

## 2022-10-11 NOTE — ANESTHESIA TIME REPORT
Anesthesia Start and Stop Event Times     Date Time Event    10/11/2022 1312 Ready for Procedure     1314 Anesthesia Start     1507 Anesthesia Stop        Responsible Staff  10/11/22    Name Role Begin End    Sha Peoples M.D. Anesth 1314 1507        Overtime Reason:  no overtime (within assigned shift)    Comments:

## 2022-10-11 NOTE — OR SURGEON
Immediate Post OP Note    PreOp Diagnosis: L4-5 DDD, right radiculopathy.       PostOp Diagnosis: Same.       Procedure(s):  MINIMALLY INVASIVE RIGHT LUMBAR 4-5 MICRODISCECTOMY - Wound Class: Clean    Surgeon(s):  Elijah Ray M.D.    Anesthesiologist/Type of Anesthesia:  Anesthesiologist: Sha Peoples M.D./General    Surgical Staff:  Assistant: Sajan Haque P.A.-C.  Circulator: Magnolia Christine R.N.  Relief Circulator: Nirmal Villatoro R.N.  Scrub Person: Sade Mancilla  Radiology Technologist: Munira Helton    Specimens removed if any:  * No specimens in log *    Estimated Blood Loss: < 40 cc     Findings: L4-5 DDD, see dictation.     Complications: None.          10/11/2022 3:09 PM Sajan Haque P.A.-C.

## 2022-10-11 NOTE — ANESTHESIA PROCEDURE NOTES
Airway    Date/Time: 10/11/2022 1:20 PM  Performed by: Sha Peoples M.D.  Authorized by: Sha Peoples M.D.     Location:  OR  Urgency:  Elective  Indications for Airway Management:  Anesthesia      Spontaneous Ventilation: absent    Sedation Level:  Deep  Preoxygenated: Yes    Patient Position:  Sniffing  Mask Difficulty Assessment:  1 - vent by mask  Final Airway Type:  Endotracheal airway  Final Endotracheal Airway:  ETT  Cuffed: Yes    Technique Used for Successful ETT Placement:  Direct laryngoscopy    Insertion Site:  Oral  Blade Type:  Sravanthi  Laryngoscope Blade/Videolaryngoscope Blade Size:  4  ETT Size (mm):  7.5  Measured from:  Lips  ETT to Lips (cm):  23  Placement Verified by: auscultation and capnometry    Cormack-Lehane Classification:  Grade I - full view of glottis  Number of Attempts at Approach:  1           Melissa, Smita's mother, called to find out the results of Smita's recent blood tests.  Please call mom to go over them with her.  Her number is [phone].

## 2022-10-11 NOTE — OR NURSING
1502 - Pt to PACU 6A from OR.  Bedside report from anesthesiologist and RN.  Attached to monitoring, VSS, breathing is calm and unlabored. 5L mask, dressings to lower back are clean/dry/intact.  No sign pain or nausea.     1513 - PA bedside to assess patient, no concerns.  Pt more awake, on room air, denies pain or nausea. Had some water.     1530 - Pt medicated for reported pain, had juice and some crackers.  Updated pt wife regarding status.      1628 - Pt stable to discharge.  Instructions given, patient and wife verbalize understanding.  IV And armbands removed.  Taken via wheelchair with CNA escort to car.  Pt has all belongings with them.

## 2022-10-11 NOTE — DISCHARGE INSTRUCTIONS
HOME CARE INSTRUCTIONS    ACTIVITY: Rest and take it easy for the first 24 hours.  A responsible adult is recommended to remain with you during that time.  It is normal to feel sleepy.  We encourage you to not do anything that requires balance, judgment or coordination.    FOR 24 HOURS DO NOT:  Drive, operate machinery or run household appliances.  Drink beer or alcoholic beverages.  Make important decisions or sign legal documents.    SPECIAL INSTRUCTIONS: Ok to shower tomorrow.  Avoid repetitive lifting, pushing, pulling greater than 15 pounds. Follow up with De in 2 - 6 weeks.  Call with questions or concerns.   Scopolamine patch can stay in place for 3 days, may remove sooner if desired.  Wash hands thoroughly with soap and water if patch is touched and when removed.     DIET: To avoid nausea, slowly advance diet as tolerated, avoiding spicy or greasy foods for the first day.  Add more substantial food to your diet according to your physician's instructions.  Babies can be fed formula or breast milk as soon as they are hungry.  INCREASE FLUIDS AND FIBER TO AVOID CONSTIPATION.    MEDICATIONS: Resume taking daily medication.  Take prescribed pain medication with food.  If no medication is prescribed, you may take non-aspirin pain medication if needed.  PAIN MEDICATION CAN BE VERY CONSTIPATING.  Take a stool softener or laxative such as senokot, pericolace, or milk of magnesia if needed.    Last pain medication given: 1,000mg Tylenol given at 11:30.  Oxycodone given at 3:30pm.     A follow-up appointment should be arranged with your doctor; call to schedule.    You should CALL YOUR PHYSICIAN if you develop:  Fever greater than 101 degrees F.  Pain not relieved by medication, or persistent nausea or vomiting.  Excessive bleeding (blood soaking through dressing) or unexpected drainage from the wound.  Extreme redness or swelling around the incision site, drainage of pus or foul smelling drainage.  Inability  to urinate or empty your bladder within 8 hours.  Problems with breathing or chest pain.    You should call 911 if you develop problems with breathing or chest pain.  If you are unable to contact your doctor or surgical center, you should go to the nearest emergency room or urgent care center.    Physician's telephone #: Dr. Ray [phone]    MILD FLU-LIKE SYMPTOMS ARE NORMAL.  YOU MAY EXPERIENCE GENERALIZED MUSCLE ACHES, THROAT IRRITATION, HEADACHE AND/OR SOME NAUSEA.    If any questions arise, call your doctor.  If your doctor is not available, please feel free to call the Surgical Center at [phone].  The Center is open Monday through Friday from 7AM to 7PM.      A registered nurse may call you a few days after your surgery to see how you are doing after your procedure.    You may also receive a survey in the mail within the next two weeks and we ask that you take a few moments to complete the survey and return it to us.  Our goal is to provide you with very good care and we value your comments.     Depression / Suicide Risk    As you are discharged from this [facility] facility, it is important to learn how to keep safe from harming yourself.    Recognize the warning signs:  Abrupt changes in personality, positive or negative- including increase in energy   Giving away possessions  Change in eating patterns- significant weight changes-  positive or negative  Change in sleeping patterns- unable to sleep or sleeping all the time   Unwillingness or inability to communicate  Depression  Unusual sadness, discouragement and loneliness  Talk of wanting to die  Neglect of personal appearance   Rebelliousness- reckless behavior  Withdrawal from people/activities they love  Confusion- inability to concentrate     If you or a loved one observes any of these behaviors or has concerns about self-harm, here's what you can do:  Talk about it- your feelings and reasons for harming yourself  Remove any means that  you might use to hurt yourself (examples: pills, rope, extension cords, firearm)  Get professional help from the community (Mental Health, Substance Abuse, psychological counseling)  Do not be alone:Call your Safe Contact- someone whom you trust who will be there for you.  Call your local CRISIS HOTLINE 739-9368 or 881-892-3933  Call your local Children's Mobile Crisis Response Team [place] (377) 421-9491 or www.[employer]  Call the toll free National Suicide Prevention Hotlines   National Suicide Prevention Lifeline 036-713-DWFU (3398)  [facility] Line Network 800-SUICIDE (249-9103)    I acknowledge receipt and understanding of these Home Care instructions.

## 2022-10-11 NOTE — ANESTHESIA POSTPROCEDURE EVALUATION
Patient: Nando Nelson    Procedure Summary     Date: 10/11/22 Room / Location: [facility] 06 / SURGERY [facility]    Anesthesia Start: 1314 Anesthesia Stop: 1507    Procedure: MINIMALLY INVASIVE RIGHT LUMBAR 4-5 MICRODISCECTOMY (Back) Diagnosis:       Disc displacement, lumbar      Radiculopathy      (LUMBAR DISC DISPLACEMENT, RADICULOPATHY)    Surgeons: Elijah Ray M.D. Responsible Provider: Sha Peoples M.D.    Anesthesia Type: general ASA Status: 2          Final Anesthesia Type: general  Last vitals  BP   Blood Pressure : (!) 167/83    Temp   36.2 °C (97.2 °F)    Pulse   83   Resp   18    SpO2   90 %      Anesthesia Post Evaluation    Patient location during evaluation: PACU  Patient participation: complete - patient participated  Level of consciousness: awake and alert  Pain score: 2    Airway patency: patent  Anesthetic complications: no  Cardiovascular status: hemodynamically stable  Respiratory status: acceptable  Hydration status: euvolemic    PONV: none          No notable events documented.     Nurse Pain Score: 5 (NPRS)

## 2022-10-12 NOTE — OP REPORT
DATE OF SERVICE:  10/11/2022     PREOPERATIVE DIAGNOSES.  1.  Right L4-L5 radiculopathies.  2.  Right L4-L5 lateral recess and foraminal stenosis.  3.  Right L4-L5 disk herniation.  4.  Failed conservative care.     POSTOPERATIVE DIAGNOSES:    1.  Right L4-L5 radiculopathies.  2.  Right L4-L5 lateral recess and foraminal stenosis.  3.  Right L4-L5 disk herniation.  4.  Failed conservative care.     PROCEDURES:  Minimally invasive approach with Lattus retractor system.  1.  Right L4 laminotomy and foraminotomy, decompression of right L4 nerve   root.  2.  Right L5 laminotomy and foraminotomy, decompression of right L5 nerve   root.  3.  Right L4 transpedicular approach far lateral decompression of right L4   nerve root.  4.  Right L5 transpedicular approach far lateral decompression of right L5   nerve root.  5.  Right L4-L5 microdiskectomy.  6.  SSEPs and EMG monitoring performed by neuromonitoring associates, which   remained stable throughout.     SURGEON:  Elijah Ray MD, Neurosurgery and Spine Surgery.     ASSISTANT:  Sajan Haque PA-C     ANESTHESIA:   General endotracheal anesthesia     ANESTHESIOLOGIST:  Sha Peoples MD     COMPLICATIONS:  None.     ESTIMATED BLOOD LOSS:  Less than 50 mL.     PREOPERATIVE NOTE:  This extremely pleasant 67-year-old male who presents with   right lower extremity radicular leg pain, weakness and paresthesia consistent   right L4-5 radiculopathies.  MRI showed right L4-L5 lateral recess and   foraminal stenosis and a far lateral disk herniation at L4-L5.  The patient   failed extensive conservative care.  I discussed surgical procedure,   alternatives, goals, risks, benefits, complications in detail with the   patient.  The patient understood and consented to surgery.  Details well   contained in the office visit notes.     DESCRIPTION OF PROCEDURE:  The patient was brought to the operating room and   placed under general anesthesia.  He was placed prone on the  operating OSI   table with care taken to avoid bony prominences, peripheral nerves.  Lumbar   region prepped and draped in the usual sterile fashion.  Following   localization of cross table fluoroscopy, a small incision made right of   midline over L4-L5 and the fascia was incised using serial dilator tubes for   muscle splitting approach and docked on the right L4 pars.  Sequential   dilation was achieved and the Lattus retractor system was applied.  The blades   opened up allowing excellent exposure at L4-L5 level.     Using Midas Sandeep AM-8 drill bit, Kerrison rongeurs and curettes, right L4   laminotomy and foraminotomy was completed, decompression of right L4 nerve   root.  Separately and distinctly right L5 laminotomy and foraminotomy was   completed, decompression of right L5 nerve root.  Marked facet and ligamentous   hypertrophy was undercut and removed.  Given the amount of stenosis, I   drilled down the right L4 pedicle for transpedicular decompression of right L4   nerve root.  Separately and distinctly, I drilled down the right L5 pedicle   for transpedicular decompression of right L5 nerve root.  This required extra   degree of expertise, effort and time, hence a modifier-59 is required for CPT   code 61930-16804.  The edges of bone were bone waxed.  Thrombin Gelfoam placed   in epidural gutters for hemostasis.  Paz ball hook was easily placed over   the exiting nerve roots.  Thecal sac and nerve roots were nice and freed up   throughout.  I protected the nerve roots.  Microscope was used for   microdissection of spinal canal.  I incised the disk at L4-L5, removed the far   lateral disk herniation freeing up the right L4-L5 nerve root complexes.    Wound was irrigated, hemostasis achieved.  I placed an epidural catheter with   Marcaine and fentanyl for postoperative analgesia, infiltrated the muscle with   Exparel analgesia and closed the wound with 0 Vicryl deep fascia, 2-0 Vicryl   deep dermal  layer, Steri-Strips to skin.  Small sterile dressing was applied.    Swabs, needles, instruments correct by two count.  No complications were   encountered.  The patient tolerated the procedure, stable and transferred to   recovery room.  The patient will be observed at [facility]   until he meets discharge criteria.  The patient will follow up at [facility] in 2 weeks and 6 weeks' time as arranged.     INTRAOPERATIVE FINDINGS:  Severe stenosis, right side L4-L5 with superimposed   disk herniation, which was freed up throughout.  No complications were   encountered.  The patient was stable in recovery room, doing well.        ______________________________  STEPAN ALMAGUER MD    JAFUA/TIFFANY/CHUNG    DD:  10/11/2022 17:07  DT:  10/11/2022 17:50    Job#:  [id number]    CC:Qasim Andrea MD(User)

## 2022-10-24 ENCOUNTER — HOSPITAL ENCOUNTER (OUTPATIENT)
Dept: RADIOLOGY | Facility: MEDICAL CENTER | Age: 67
End: 2022-10-24
Payer: MEDICARE

## 2022-11-02 ENCOUNTER — PATIENT MESSAGE (OUTPATIENT)
Dept: HEALTH INFORMATION MANAGEMENT | Facility: OTHER | Age: 67
End: 2022-11-02

## 2023-05-09 ENCOUNTER — HOSPITAL ENCOUNTER (OUTPATIENT)
Facility: MEDICAL CENTER | Age: 68
End: 2023-05-09
Attending: UROLOGY
Payer: MEDICARE

## 2023-05-09 PROCEDURE — 87077 CULTURE AEROBIC IDENTIFY: CPT

## 2023-05-09 PROCEDURE — 87186 SC STD MICRODIL/AGAR DIL: CPT

## 2023-05-09 PROCEDURE — 87086 URINE CULTURE/COLONY COUNT: CPT

## 2023-05-30 NOTE — HPI: SKIN LESION
Is This A New Presentation, Or A Follow-Up?: Skin Lesion
What Type Of Note Output Would You Prefer (Optional)?: Bullet Format
How Severe Is Your Skin Lesion?: mild
Has Your Skin Lesion Been Treated?: not been treated
no

## 2023-06-15 ENCOUNTER — HOSPITAL ENCOUNTER (OUTPATIENT)
Facility: MEDICAL CENTER | Age: 68
End: 2023-06-15
Attending: UROLOGY
Payer: MEDICARE

## 2023-06-15 ENCOUNTER — HOSPITAL ENCOUNTER (OUTPATIENT)
Dept: CARDIOLOGY | Facility: MEDICAL CENTER | Age: 68
End: 2023-06-15
Attending: UROLOGY
Payer: MEDICARE

## 2023-06-15 ENCOUNTER — HOSPITAL ENCOUNTER (OUTPATIENT)
Dept: LAB | Facility: MEDICAL CENTER | Age: 68
End: 2023-06-15
Attending: UROLOGY
Payer: MEDICARE

## 2023-06-15 DIAGNOSIS — Z01.810 PRE-OPERATIVE CARDIOVASCULAR EXAMINATION: ICD-10-CM

## 2023-06-15 LAB
ANION GAP SERPL CALC-SCNC: 16 MMOL/L (ref 7–16)
APPEARANCE UR: CLEAR
APTT PPP: 27.8 SEC (ref 24.7–36)
BACTERIA #/AREA URNS HPF: NEGATIVE /HPF
BASOPHILS # BLD AUTO: 1.1 % (ref 0–1.8)
BASOPHILS # BLD: 0.07 K/UL (ref 0–0.12)
BILIRUB UR QL STRIP.AUTO: NEGATIVE
BUN SERPL-MCNC: 24 MG/DL (ref 8–22)
CALCIUM SERPL-MCNC: 10 MG/DL (ref 8.5–10.5)
CHLORIDE SERPL-SCNC: 104 MMOL/L (ref 96–112)
CO2 SERPL-SCNC: 20 MMOL/L (ref 20–33)
COLOR UR: YELLOW
CREAT SERPL-MCNC: 0.85 MG/DL (ref 0.5–1.4)
EKG IMPRESSION: NORMAL
EOSINOPHIL # BLD AUTO: 0.14 K/UL (ref 0–0.51)
EOSINOPHIL NFR BLD: 2.2 % (ref 0–6.9)
EPI CELLS #/AREA URNS HPF: NEGATIVE /HPF
ERYTHROCYTE [DISTWIDTH] IN BLOOD BY AUTOMATED COUNT: 44.7 FL (ref 35.9–50)
GFR SERPLBLD CREATININE-BSD FMLA CKD-EPI: 95 ML/MIN/1.73 M 2
GLUCOSE SERPL-MCNC: 109 MG/DL (ref 65–99)
GLUCOSE UR STRIP.AUTO-MCNC: NEGATIVE MG/DL
HCT VFR BLD AUTO: 51.1 % (ref 42–52)
HGB BLD-MCNC: 16.6 G/DL (ref 14–18)
HYALINE CASTS #/AREA URNS LPF: ABNORMAL /LPF
IMM GRANULOCYTES # BLD AUTO: 0.01 K/UL (ref 0–0.11)
IMM GRANULOCYTES NFR BLD AUTO: 0.2 % (ref 0–0.9)
INR PPP: 0.98 (ref 0.87–1.13)
KETONES UR STRIP.AUTO-MCNC: NEGATIVE MG/DL
LEUKOCYTE ESTERASE UR QL STRIP.AUTO: ABNORMAL
LYMPHOCYTES # BLD AUTO: 1.51 K/UL (ref 1–4.8)
LYMPHOCYTES NFR BLD: 23.2 % (ref 22–41)
MCH RBC QN AUTO: 28.9 PG (ref 27–33)
MCHC RBC AUTO-ENTMCNC: 32.5 G/DL (ref 32.3–36.5)
MCV RBC AUTO: 89 FL (ref 81.4–97.8)
MICRO URNS: ABNORMAL
MONOCYTES # BLD AUTO: 0.52 K/UL (ref 0–0.85)
MONOCYTES NFR BLD AUTO: 8 % (ref 0–13.4)
NEUTROPHILS # BLD AUTO: 4.26 K/UL (ref 1.82–7.42)
NEUTROPHILS NFR BLD: 65.3 % (ref 44–72)
NITRITE UR QL STRIP.AUTO: NEGATIVE
NRBC # BLD AUTO: 0 K/UL
NRBC BLD-RTO: 0 /100 WBC (ref 0–0.2)
PH UR STRIP.AUTO: 5.5 [PH] (ref 5–8)
PLATELET # BLD AUTO: 221 K/UL (ref 164–446)
PMV BLD AUTO: 9.9 FL (ref 9–12.9)
POTASSIUM SERPL-SCNC: 4.7 MMOL/L (ref 3.6–5.5)
PROT UR QL STRIP: NEGATIVE MG/DL
PROTHROMBIN TIME: 12.9 SEC (ref 12–14.6)
RBC # BLD AUTO: 5.74 M/UL (ref 4.7–6.1)
RBC # URNS HPF: ABNORMAL /HPF
RBC UR QL AUTO: ABNORMAL
SODIUM SERPL-SCNC: 140 MMOL/L (ref 135–145)
SP GR UR STRIP.AUTO: 1.02
UROBILINOGEN UR STRIP.AUTO-MCNC: 0.2 MG/DL
WBC # BLD AUTO: 6.5 K/UL (ref 4.8–10.8)
WBC #/AREA URNS HPF: ABNORMAL /HPF

## 2023-06-15 PROCEDURE — 87086 URINE CULTURE/COLONY COUNT: CPT

## 2023-06-15 PROCEDURE — 87186 SC STD MICRODIL/AGAR DIL: CPT | Mod: 91

## 2023-06-15 PROCEDURE — 93005 ELECTROCARDIOGRAM TRACING: CPT

## 2023-06-15 PROCEDURE — 87077 CULTURE AEROBIC IDENTIFY: CPT

## 2023-06-15 PROCEDURE — 85610 PROTHROMBIN TIME: CPT

## 2023-06-15 PROCEDURE — 87086 URINE CULTURE/COLONY COUNT: CPT | Mod: 91

## 2023-06-15 PROCEDURE — 93010 ELECTROCARDIOGRAM REPORT: CPT | Performed by: INTERNAL MEDICINE

## 2023-06-15 PROCEDURE — 85730 THROMBOPLASTIN TIME PARTIAL: CPT

## 2023-06-15 PROCEDURE — 80048 BASIC METABOLIC PNL TOTAL CA: CPT

## 2023-06-15 PROCEDURE — 85025 COMPLETE CBC W/AUTO DIFF WBC: CPT

## 2023-06-15 PROCEDURE — 87077 CULTURE AEROBIC IDENTIFY: CPT | Mod: 91

## 2023-06-15 PROCEDURE — 36415 COLL VENOUS BLD VENIPUNCTURE: CPT

## 2023-06-15 PROCEDURE — 87186 SC STD MICRODIL/AGAR DIL: CPT

## 2023-06-15 PROCEDURE — 81001 URINALYSIS AUTO W/SCOPE: CPT

## 2023-06-18 SDOH — HEALTH STABILITY: PHYSICAL HEALTH: ON AVERAGE, HOW MANY MINUTES DO YOU ENGAGE IN EXERCISE AT THIS LEVEL?: 80 MIN

## 2023-06-18 SDOH — ECONOMIC STABILITY: TRANSPORTATION INSECURITY
IN THE PAST 12 MONTHS, HAS LACK OF RELIABLE TRANSPORTATION KEPT YOU FROM MEDICAL APPOINTMENTS, MEETINGS, WORK OR FROM GETTING THINGS NEEDED FOR DAILY LIVING?: NO

## 2023-06-18 SDOH — ECONOMIC STABILITY: INCOME INSECURITY: IN THE LAST 12 MONTHS, WAS THERE A TIME WHEN YOU WERE NOT ABLE TO PAY THE MORTGAGE OR RENT ON TIME?: NO

## 2023-06-18 SDOH — HEALTH STABILITY: MENTAL HEALTH
STRESS IS WHEN SOMEONE FEELS TENSE, NERVOUS, ANXIOUS, OR CAN'T SLEEP AT NIGHT BECAUSE THEIR MIND IS TROUBLED. HOW STRESSED ARE YOU?: NOT AT ALL

## 2023-06-18 SDOH — ECONOMIC STABILITY: FOOD INSECURITY: WITHIN THE PAST 12 MONTHS, YOU WORRIED THAT YOUR FOOD WOULD RUN OUT BEFORE YOU GOT MONEY TO BUY MORE.: NEVER TRUE

## 2023-06-18 SDOH — ECONOMIC STABILITY: HOUSING INSECURITY
IN THE LAST 12 MONTHS, WAS THERE A TIME WHEN YOU DID NOT HAVE A STEADY PLACE TO SLEEP OR SLEPT IN A SHELTER (INCLUDING NOW)?: NO

## 2023-06-18 SDOH — ECONOMIC STABILITY: FOOD INSECURITY: WITHIN THE PAST 12 MONTHS, THE FOOD YOU BOUGHT JUST DIDN'T LAST AND YOU DIDN'T HAVE MONEY TO GET MORE.: NEVER TRUE

## 2023-06-18 SDOH — ECONOMIC STABILITY: HOUSING INSECURITY: IN THE LAST 12 MONTHS, HOW MANY PLACES HAVE YOU LIVED?: 1

## 2023-06-18 SDOH — ECONOMIC STABILITY: INCOME INSECURITY: HOW HARD IS IT FOR YOU TO PAY FOR THE VERY BASICS LIKE FOOD, HOUSING, MEDICAL CARE, AND HEATING?: NOT VERY HARD

## 2023-06-18 SDOH — ECONOMIC STABILITY: TRANSPORTATION INSECURITY
IN THE PAST 12 MONTHS, HAS THE LACK OF TRANSPORTATION KEPT YOU FROM MEDICAL APPOINTMENTS OR FROM GETTING MEDICATIONS?: NO

## 2023-06-18 SDOH — ECONOMIC STABILITY: TRANSPORTATION INSECURITY
IN THE PAST 12 MONTHS, HAS LACK OF TRANSPORTATION KEPT YOU FROM MEETINGS, WORK, OR FROM GETTING THINGS NEEDED FOR DAILY LIVING?: NO

## 2023-06-18 SDOH — HEALTH STABILITY: PHYSICAL HEALTH: ON AVERAGE, HOW MANY DAYS PER WEEK DO YOU ENGAGE IN MODERATE TO STRENUOUS EXERCISE (LIKE A BRISK WALK)?: 4 DAYS

## 2023-06-18 ASSESSMENT — SOCIAL DETERMINANTS OF HEALTH (SDOH)
HOW OFTEN DO YOU GET TOGETHER WITH FRIENDS OR RELATIVES?: TWICE A WEEK
HOW OFTEN DO YOU ATTENT MEETINGS OF THE CLUB OR ORGANIZATION YOU BELONG TO?: NEVER
DO YOU BELONG TO ANY CLUBS OR ORGANIZATIONS SUCH AS CHURCH GROUPS UNIONS, FRATERNAL OR ATHLETIC GROUPS, OR SCHOOL GROUPS?: NO
DO YOU BELONG TO ANY CLUBS OR ORGANIZATIONS SUCH AS CHURCH GROUPS UNIONS, FRATERNAL OR ATHLETIC GROUPS, OR SCHOOL GROUPS?: NO
IN A TYPICAL WEEK, HOW MANY TIMES DO YOU TALK ON THE PHONE WITH FAMILY, FRIENDS, OR NEIGHBORS?: MORE THAN THREE TIMES A WEEK
HOW OFTEN DO YOU ATTEND CHURCH OR RELIGIOUS SERVICES?: 1 TO 4 TIMES PER YEAR
HOW MANY DRINKS CONTAINING ALCOHOL DO YOU HAVE ON A TYPICAL DAY WHEN YOU ARE DRINKING: 1 OR 2
HOW OFTEN DO YOU HAVE SIX OR MORE DRINKS ON ONE OCCASION: NEVER
WITHIN THE PAST 12 MONTHS, YOU WORRIED THAT YOUR FOOD WOULD RUN OUT BEFORE YOU GOT THE MONEY TO BUY MORE: NEVER TRUE
HOW OFTEN DO YOU HAVE A DRINK CONTAINING ALCOHOL: MONTHLY OR LESS
HOW HARD IS IT FOR YOU TO PAY FOR THE VERY BASICS LIKE FOOD, HOUSING, MEDICAL CARE, AND HEATING?: NOT VERY HARD
IN A TYPICAL WEEK, HOW MANY TIMES DO YOU TALK ON THE PHONE WITH FAMILY, FRIENDS, OR NEIGHBORS?: MORE THAN THREE TIMES A WEEK
HOW OFTEN DO YOU ATTEND CHURCH OR RELIGIOUS SERVICES?: 1 TO 4 TIMES PER YEAR
HOW OFTEN DO YOU GET TOGETHER WITH FRIENDS OR RELATIVES?: TWICE A WEEK
HOW OFTEN DO YOU ATTENT MEETINGS OF THE CLUB OR ORGANIZATION YOU BELONG TO?: NEVER

## 2023-06-18 ASSESSMENT — LIFESTYLE VARIABLES
SKIP TO QUESTIONS 9-10: 1
HOW MANY STANDARD DRINKS CONTAINING ALCOHOL DO YOU HAVE ON A TYPICAL DAY: 1 OR 2
AUDIT-C TOTAL SCORE: 1
HOW OFTEN DO YOU HAVE SIX OR MORE DRINKS ON ONE OCCASION: NEVER
HOW OFTEN DO YOU HAVE A DRINK CONTAINING ALCOHOL: MONTHLY OR LESS

## 2023-06-21 ENCOUNTER — OFFICE VISIT (OUTPATIENT)
Dept: MEDICAL GROUP | Facility: PHYSICIAN GROUP | Age: 68
End: 2023-06-21
Payer: MEDICARE

## 2023-06-21 VITALS
HEART RATE: 83 BPM | WEIGHT: 268.9 LBS | DIASTOLIC BLOOD PRESSURE: 68 MMHG | SYSTOLIC BLOOD PRESSURE: 136 MMHG | BODY MASS INDEX: 39.83 KG/M2 | TEMPERATURE: 98.4 F | HEIGHT: 69 IN | RESPIRATION RATE: 18 BRPM | OXYGEN SATURATION: 96 %

## 2023-06-21 DIAGNOSIS — N20.0 KIDNEY STONES: ICD-10-CM

## 2023-06-21 DIAGNOSIS — E55.9 VITAMIN D DEFICIENCY: ICD-10-CM

## 2023-06-21 DIAGNOSIS — G47.30 SLEEP APNEA, UNSPECIFIED TYPE: ICD-10-CM

## 2023-06-21 DIAGNOSIS — R63.5 WEIGHT GAIN: ICD-10-CM

## 2023-06-21 DIAGNOSIS — E78.5 HYPERLIPIDEMIA, UNSPECIFIED HYPERLIPIDEMIA TYPE: ICD-10-CM

## 2023-06-21 DIAGNOSIS — R73.9 HYPERGLYCEMIA: ICD-10-CM

## 2023-06-21 DIAGNOSIS — R74.01 HIGH ALANINE AMINOTRANSFERASE (ALT) LEVEL: ICD-10-CM

## 2023-06-21 PROBLEM — N40.1 LOWER URINARY TRACT SYMPTOMS DUE TO BENIGN PROSTATIC HYPERPLASIA: Status: ACTIVE | Noted: 2023-05-09

## 2023-06-21 PROBLEM — N21.0 URINARY BLADDER STONE: Status: ACTIVE | Noted: 2023-06-21

## 2023-06-21 PROCEDURE — 3075F SYST BP GE 130 - 139MM HG: CPT | Performed by: FAMILY MEDICINE

## 2023-06-21 PROCEDURE — 3078F DIAST BP <80 MM HG: CPT | Performed by: FAMILY MEDICINE

## 2023-06-21 PROCEDURE — 99214 OFFICE O/P EST MOD 30 MIN: CPT | Performed by: FAMILY MEDICINE

## 2023-06-21 RX ORDER — AMOXICILLIN AND CLAVULANATE POTASSIUM 500; 125 MG/1; MG/1
TABLET, FILM COATED ORAL
COMMUNITY
End: 2023-07-25

## 2023-06-21 RX ORDER — CLONIDINE HYDROCHLORIDE 0.1 MG/1
TABLET ORAL
COMMUNITY
End: 2023-06-21

## 2023-06-21 RX ORDER — DIAZEPAM 10 MG/1
TABLET ORAL
COMMUNITY
End: 2023-06-21

## 2023-06-21 RX ORDER — AZELASTINE HCL 205.5 UG/1
SPRAY NASAL
COMMUNITY
End: 2023-06-21

## 2023-06-21 RX ORDER — METHYLPREDNISOLONE 4 MG/1
TABLET ORAL
COMMUNITY
End: 2023-06-21

## 2023-06-21 RX ORDER — HYDROCODONE BITARTRATE AND ACETAMINOPHEN 10; 325 MG/1; MG/1
TABLET ORAL
COMMUNITY
End: 2023-06-21

## 2023-06-21 RX ORDER — ALBUTEROL SULFATE 90 UG/1
AEROSOL, METERED RESPIRATORY (INHALATION)
COMMUNITY
End: 2023-07-25

## 2023-06-21 RX ORDER — AMOXICILLIN 500 MG/1
CAPSULE ORAL
COMMUNITY
Start: 2023-05-15 | End: 2023-06-21

## 2023-06-21 RX ORDER — CELECOXIB 200 MG/1
CAPSULE ORAL
COMMUNITY
End: 2023-06-21

## 2023-06-21 RX ORDER — ALPRAZOLAM 0.5 MG/1
TABLET ORAL
COMMUNITY
End: 2023-06-21

## 2023-06-21 RX ORDER — OXYCODONE HYDROCHLORIDE AND ACETAMINOPHEN 5; 325 MG/1; MG/1
TABLET ORAL
COMMUNITY
End: 2023-06-21

## 2023-06-21 RX ORDER — AMOXICILLIN AND CLAVULANATE POTASSIUM 500; 125 MG/1; MG/1
TABLET, FILM COATED ORAL
COMMUNITY
Start: 2023-06-19 | End: 2023-06-21

## 2023-06-21 RX ORDER — NITROFURANTOIN 25; 75 MG/1; MG/1
CAPSULE ORAL
COMMUNITY
End: 2023-06-21

## 2023-06-21 ASSESSMENT — PATIENT HEALTH QUESTIONNAIRE - PHQ9: CLINICAL INTERPRETATION OF PHQ2 SCORE: 0

## 2023-07-04 ENCOUNTER — HOSPITAL ENCOUNTER (EMERGENCY)
Facility: MEDICAL CENTER | Age: 68
End: 2023-07-04
Attending: EMERGENCY MEDICINE
Payer: MEDICARE

## 2023-07-04 VITALS
TEMPERATURE: 96.8 F | SYSTOLIC BLOOD PRESSURE: 137 MMHG | RESPIRATION RATE: 16 BRPM | OXYGEN SATURATION: 92 % | BODY MASS INDEX: 41.31 KG/M2 | WEIGHT: 278.88 LBS | HEIGHT: 69 IN | DIASTOLIC BLOOD PRESSURE: 67 MMHG | HEART RATE: 72 BPM

## 2023-07-04 DIAGNOSIS — T83.9XXA FOLEY CATHETER PROBLEM, INITIAL ENCOUNTER (HCC): ICD-10-CM

## 2023-07-04 DIAGNOSIS — S37.30XA INJURY OF URETHRA, INITIAL ENCOUNTER: ICD-10-CM

## 2023-07-04 LAB
ANION GAP SERPL CALC-SCNC: 12 MMOL/L (ref 7–16)
APPEARANCE UR: ABNORMAL
BACTERIA #/AREA URNS HPF: ABNORMAL /HPF
BASOPHILS # BLD AUTO: 0.2 % (ref 0–1.8)
BASOPHILS # BLD: 0.04 K/UL (ref 0–0.12)
BILIRUB UR QL STRIP.AUTO: ABNORMAL
BUN SERPL-MCNC: 22 MG/DL (ref 8–22)
CALCIUM SERPL-MCNC: 9.6 MG/DL (ref 8.5–10.5)
CAOX CRY #/AREA URNS HPF: ABNORMAL /HPF
CHLORIDE SERPL-SCNC: 103 MMOL/L (ref 96–112)
CO2 SERPL-SCNC: 25 MMOL/L (ref 20–33)
COLOR UR: ABNORMAL
CREAT SERPL-MCNC: 1 MG/DL (ref 0.5–1.4)
EOSINOPHIL # BLD AUTO: 0.05 K/UL (ref 0–0.51)
EOSINOPHIL NFR BLD: 0.3 % (ref 0–6.9)
EPI CELLS #/AREA URNS HPF: ABNORMAL /HPF
ERYTHROCYTE [DISTWIDTH] IN BLOOD BY AUTOMATED COUNT: 43.5 FL (ref 35.9–50)
GFR SERPLBLD CREATININE-BSD FMLA CKD-EPI: 82 ML/MIN/1.73 M 2
GLUCOSE SERPL-MCNC: 121 MG/DL (ref 65–99)
GLUCOSE UR STRIP.AUTO-MCNC: 100 MG/DL
HCT VFR BLD AUTO: 49 % (ref 42–52)
HGB BLD-MCNC: 16.3 G/DL (ref 14–18)
HYALINE CASTS #/AREA URNS LPF: ABNORMAL /LPF
IMM GRANULOCYTES # BLD AUTO: 0.2 K/UL (ref 0–0.11)
IMM GRANULOCYTES NFR BLD AUTO: 1.1 % (ref 0–0.9)
KETONES UR STRIP.AUTO-MCNC: ABNORMAL MG/DL
LEUKOCYTE ESTERASE UR QL STRIP.AUTO: ABNORMAL
LYMPHOCYTES # BLD AUTO: 1.75 K/UL (ref 1–4.8)
LYMPHOCYTES NFR BLD: 9.9 % (ref 22–41)
MCH RBC QN AUTO: 29 PG (ref 27–33)
MCHC RBC AUTO-ENTMCNC: 33.3 G/DL (ref 32.3–36.5)
MCV RBC AUTO: 87.2 FL (ref 81.4–97.8)
MICRO URNS: ABNORMAL
MONOCYTES # BLD AUTO: 1.17 K/UL (ref 0–0.85)
MONOCYTES NFR BLD AUTO: 6.6 % (ref 0–13.4)
NEUTROPHILS # BLD AUTO: 14.5 K/UL (ref 1.82–7.42)
NEUTROPHILS NFR BLD: 81.9 % (ref 44–72)
NITRITE UR QL STRIP.AUTO: POSITIVE
NRBC # BLD AUTO: 0 K/UL
NRBC BLD-RTO: 0 /100 WBC (ref 0–0.2)
PH UR STRIP.AUTO: 6.5 [PH] (ref 5–8)
PLATELET # BLD AUTO: 239 K/UL (ref 164–446)
PMV BLD AUTO: 9.4 FL (ref 9–12.9)
POTASSIUM SERPL-SCNC: 4.4 MMOL/L (ref 3.6–5.5)
PROT UR QL STRIP: >=300 MG/DL
RBC # BLD AUTO: 5.62 M/UL (ref 4.7–6.1)
RBC # URNS HPF: >150 /HPF
RBC UR QL AUTO: ABNORMAL
SODIUM SERPL-SCNC: 140 MMOL/L (ref 135–145)
SP GR UR STRIP.AUTO: 1.02
UROBILINOGEN UR STRIP.AUTO-MCNC: 2 MG/DL
WBC # BLD AUTO: 17.7 K/UL (ref 4.8–10.8)
WBC #/AREA URNS HPF: ABNORMAL /HPF

## 2023-07-04 PROCEDURE — 87086 URINE CULTURE/COLONY COUNT: CPT

## 2023-07-04 PROCEDURE — 303105 HCHG CATHETER EXTRA

## 2023-07-04 PROCEDURE — 81001 URINALYSIS AUTO W/SCOPE: CPT

## 2023-07-04 PROCEDURE — 96374 THER/PROPH/DIAG INJ IV PUSH: CPT

## 2023-07-04 PROCEDURE — 85025 COMPLETE CBC W/AUTO DIFF WBC: CPT

## 2023-07-04 PROCEDURE — 36415 COLL VENOUS BLD VENIPUNCTURE: CPT

## 2023-07-04 PROCEDURE — 80048 BASIC METABOLIC PNL TOTAL CA: CPT

## 2023-07-04 PROCEDURE — 51702 INSERT TEMP BLADDER CATH: CPT

## 2023-07-04 PROCEDURE — 96375 TX/PRO/DX INJ NEW DRUG ADDON: CPT

## 2023-07-04 PROCEDURE — 99284 EMERGENCY DEPT VISIT MOD MDM: CPT

## 2023-07-04 PROCEDURE — 700101 HCHG RX REV CODE 250: Performed by: EMERGENCY MEDICINE

## 2023-07-04 PROCEDURE — 51798 US URINE CAPACITY MEASURE: CPT

## 2023-07-04 PROCEDURE — 700111 HCHG RX REV CODE 636 W/ 250 OVERRIDE (IP): Mod: JZ | Performed by: EMERGENCY MEDICINE

## 2023-07-04 RX ORDER — ONDANSETRON 2 MG/ML
4 INJECTION INTRAMUSCULAR; INTRAVENOUS EVERY 4 HOURS PRN
Status: DISCONTINUED | OUTPATIENT
Start: 2023-07-04 | End: 2023-07-04 | Stop reason: HOSPADM

## 2023-07-04 RX ORDER — LIDOCAINE HYDROCHLORIDE 20 MG/ML
JELLY TOPICAL
Status: COMPLETED | OUTPATIENT
Start: 2023-07-04 | End: 2023-07-04

## 2023-07-04 RX ORDER — MORPHINE SULFATE 4 MG/ML
4 INJECTION INTRAVENOUS ONCE
Status: COMPLETED | OUTPATIENT
Start: 2023-07-04 | End: 2023-07-04

## 2023-07-04 RX ORDER — CEFTRIAXONE 2 G/1
2000 INJECTION, POWDER, FOR SOLUTION INTRAMUSCULAR; INTRAVENOUS ONCE
Status: COMPLETED | OUTPATIENT
Start: 2023-07-04 | End: 2023-07-04

## 2023-07-04 RX ADMIN — LIDOCAINE HYDROCHLORIDE 1 APPLICATION: 20 JELLY TOPICAL at 16:57

## 2023-07-04 RX ADMIN — MORPHINE SULFATE 4 MG: 4 INJECTION INTRAVENOUS at 16:57

## 2023-07-04 RX ADMIN — ONDANSETRON 4 MG: 2 INJECTION INTRAMUSCULAR; INTRAVENOUS at 16:57

## 2023-07-04 RX ADMIN — CEFTRIAXONE SODIUM 2000 MG: 2 INJECTION, POWDER, FOR SOLUTION INTRAMUSCULAR; INTRAVENOUS at 17:35

## 2023-07-04 ASSESSMENT — PAIN DESCRIPTION - PAIN TYPE: TYPE: ACUTE PAIN

## 2023-07-04 NOTE — ED PROVIDER NOTES
"ER Provider Note    Scribed for Ksenia Love M.D. by Essie Carter. 7/4/2023  2:56 PM    Primary Care Provider: Lisa Otero M.D.    CHIEF COMPLAINT  Chief Complaint   Patient presents with    Blood in Urine     Patient had surgery yesterday with urology and had a catheter placed. They gave him instructions on how to remove it but he and his wife were only able to empty about 15cc from catheter balloon so when they removed the catheter it was still partially inflated. He had some blood but is now unable to pass urine     EXTERNAL RECORDS REVIEWED  Review of patient's past medical records show that the patient has a history of bladder cancer that was diagnosed in 2009 with recurrences. He has had a TURPBT procedure and intravesicular treatment with BCG. He is followed by Dr. Gordon.     HPI/ROS  OUTSIDE HISTORIAN(S):  Significant other (Wife) at the bedside    LIMITATION TO HISTORY   None    Nando Nelson is a 68 y.o. male who presents to the Emergency Department for blood in urine onset this morning. The patient had a bladder stone removed and GreenLight laser prostatectomy at [facility] yesterday. Since today is a holiday, he was given instructions on how to remove the catheter at home if needed. When he and his wife attempted to remove catheter at 9:30 AM today, they had some difficulties and forced the catheter out. When the catheter was removed, the balloon was still partially inflated. After this, he has had blood that will \"drip out\" when is trying to urinate and there is blood in the toilet bowl as well. He is not unable to pass urine despite having urinary urgency. He had normal urinary output from the catheter and there was no blood in the urine before he removed the catheter. He also has pain to the tip of his penis, but does not experience any fever, chills, nausea, vomiting, or abdominal pain. He is not on any blood thinners.     PAST MEDICAL HISTORY  Past Medical History: " "  Diagnosis Date    Daytime sleepiness     Gasping for breath     sometimes    History of elevated glucose     RICHARD on CPAP 09/27/2022    Seasonal allergies 09/27/2022    Malachi    Snoring     DX RICHARD    Urinary bladder disorder 2010    Bladder Cancer       SURGICAL HISTORY  Past Surgical History:   Procedure Laterality Date    LUMBAR LAMINECTOMY DISKECTOMY  10/11/2022    Procedure: MINIMALLY INVASIVE RIGHT LUMBAR 4-5 MICRODISCECTOMY;  Surgeon: Elijah Ray M.D.;  Location: SURGERY [facility];  Service: Neurosurgery    CYST EXCISION      Shoulder Cyst    OTHER      Dental Implant Lower Left Jaw    OTHER      Bladder Tumor Removal       FAMILY HISTORY  Family History   Problem Relation Age of Onset    Lung Cancer Mother     Diabetes Father     Bladder cancer Father     ALS Father     Diabetes Sister     Sleep Apnea Sister        SOCIAL HISTORY   reports that he quit smoking about 33 years ago. His smoking use included cigarettes. He smoked an average of .5 packs per day. He has never used smokeless tobacco. He reports current alcohol use. He reports current drug use. Drug: Marijuana.    CURRENT MEDICATIONS  Previous Medications    ALBUTEROL 108 (90 BASE) MCG/ACT AERO SOLN INHALATION AEROSOL    albuterol sulfate HFA 90 mcg/actuation aerosol inhaler   INHALE 1 TO 2 PUFFS BY MOUTH EVERY 4 HOURS AS NEEDED    AMOXICILLIN-CLAVULANATE (AUGMENTIN) 500-125 MG TAB    Augmentin 500 mg-125 mg tablet   Take 1 tablet twice a day by oral route as directed for 7 days.    AZELASTINE HCL 0.15 % SOLUTION    Administer 2 Sprays into each nostril.    B COMPLEX CAP    Take  by mouth every day.    FEXOFENADINE HCL (ALLEGRA ALLERGY PO)    Take  by mouth as needed.    MULTIPLE VITAMIN (MULTIVITAMIN ADULT PO)    Take  by mouth.       ALLERGIES  Sagebrush allergy skin test    PHYSICAL EXAM  BP (!) 159/87   Pulse 82   Temp 36 °C (96.8 °F) (Temporal)   Resp 16   Ht 1.753 m (5' 9\")   Wt (!) 126 kg (278 lb 14.1 oz)   SpO2 94%   BMI 41.18 " kg/m²   Constitutional: Alert in no apparent distress.  HENT: Normocephalic, Bilateral external ears normal. Nose normal.   Eyes: Pupils are equal and reactive. Conjunctiva normal, non-icteric.   Thorax & Lungs: Easy unlabored respirations  Abdomen: soft, Distended urinary bladder with tenderness to the suprapubic area. No gross signs of peritonitis.    : Circumcised male with bright red blood to the meatus. No testicular swelling. Tenderness at the tip of the penis and urethra.   Skin: Visualized skin is  Dry, No erythema, No rash.   Extremities: Full range of motion of all extremities  Neurologic: Alert, clear speech  Psychiatric: Affect and Mood normal    DIAGNOSTIC STUDIES & PROCEDURES    Labs:   Results for orders placed or performed during the hospital encounter of 07/04/23   CBC WITH DIFFERENTIAL   Result Value Ref Range    WBC 17.7 (H) 4.8 - 10.8 K/uL    RBC 5.62 4.70 - 6.10 M/uL    Hemoglobin 16.3 14.0 - 18.0 g/dL    Hematocrit 49.0 42.0 - 52.0 %    MCV 87.2 81.4 - 97.8 fL    MCH 29.0 27.0 - 33.0 pg    MCHC 33.3 32.3 - 36.5 g/dL    RDW 43.5 35.9 - 50.0 fL    Platelet Count 239 164 - 446 K/uL    MPV 9.4 9.0 - 12.9 fL    Neutrophils-Polys 81.90 (H) 44.00 - 72.00 %    Lymphocytes 9.90 (L) 22.00 - 41.00 %    Monocytes 6.60 0.00 - 13.40 %    Eosinophils 0.30 0.00 - 6.90 %    Basophils 0.20 0.00 - 1.80 %    Immature Granulocytes 1.10 (H) 0.00 - 0.90 %    Nucleated RBC 0.00 0.00 - 0.20 /100 WBC    Neutrophils (Absolute) 14.50 (H) 1.82 - 7.42 K/uL    Lymphs (Absolute) 1.75 1.00 - 4.80 K/uL    Monos (Absolute) 1.17 (H) 0.00 - 0.85 K/uL    Eos (Absolute) 0.05 0.00 - 0.51 K/uL    Baso (Absolute) 0.04 0.00 - 0.12 K/uL    Immature Granulocytes (abs) 0.20 (H) 0.00 - 0.11 K/uL    NRBC (Absolute) 0.00 K/uL   BASIC METABOLIC PANEL   Result Value Ref Range    Sodium 140 135 - 145 mmol/L    Potassium 4.4 3.6 - 5.5 mmol/L    Chloride 103 96 - 112 mmol/L    Co2 25 20 - 33 mmol/L    Glucose 121 (H) 65 - 99 mg/dL    Bun 22 8 -  22 mg/dL    Creatinine 1.00 0.50 - 1.40 mg/dL    Calcium 9.6 8.5 - 10.5 mg/dL    Anion Gap 12.0 7.0 - 16.0   URINALYSIS (UA)    Specimen: Urine   Result Value Ref Range    Color Brown (A)     Character Hazy (A)     Specific Gravity 1.025 <1.035    Ph 6.5 5.0 - 8.0    Glucose 100 (A) Negative mg/dL    Ketones Trace (A) Negative mg/dL    Protein >=300 (A) Negative mg/dL    Bilirubin Moderate (A) Negative    Urobilinogen, Urine 2.0 Negative    Nitrite Positive (A) Negative    Leukocyte Esterase Moderate (A) Negative    Occult Blood Large (A) Negative    Micro Urine Req Microscopic    ESTIMATED GFR   Result Value Ref Range    GFR (CKD-EPI) 82 >60 mL/min/1.73 m 2   URINE MICROSCOPIC (W/UA)   Result Value Ref Range    WBC 10-20 (A) /hpf    RBC >150 (A) /hpf    Bacteria Few (A) None /hpf    Epithelial Cells Rare /hpf    Ca Oxalate Crystal Rare /hpf    Hyaline Cast 0-2 /lpf      All labs reviewed by me.    COURSE & MEDICAL DECISION MAKING    ED Observation Status? Yes; I am placing the patient in to an observation status due to a diagnostic uncertainty as well as therapeutic intensity. Patient placed in observation status at 3:17 PM, 7/4/2023.     Observation plan is as follows: Labs and consulting with Urology.     Upon Reevaluation, the patient's condition has: Improved; and will be discharged.    Patient discharged from ED Observation status at 5:14 PM (Time) 7/4/2023 (Date).     INITIAL ASSESSMENT AND PLAN  Care Narrative: Patient presents to the ER with gross blood coming from his urethra and difficulty urinating after he removed his Garza catheter today with the balloon inflated.  The patient had a bladder stone removal done at urology [facility] yesterday.  They placed a Garza catheter.  He has an appointment tomorrow at 9 AM to get the catheter removed, but was told that if he wanted it out earlier, he could remove it at home.  He and his wife watched a video on how to remove the Garza, and his  wife said she took out about 15 cc of fluid, and then when she started to pull on the catheter she felt some resistance.  The patient told her to keep pulling and she did.  When she pulled out the Garza the balloon was still fully inflated.  They cut the tip of the Garza and the balloon deflated.  However, the balloon had been pulled completely out of the penis inflated and subsequently patient developed bleeding from his penis.  He said he was urinating out some clots, but over the last few hours has noticed that he has been unable to urinate as much as he believes he should.  He is developing a sense of urgency of urination and some discomfort in the suprapubic area.  The patient had a 422 postvoid residual.  I spoke with Dr. Engel, urologist on-call.  He recommended putting 2% lidocaine via Uro-Jet up into the urethra and then placing an 18 French catheter to create some tamponade against what is likely a urethral injury from Garza catheter removal with balloon inflated.  Patient does not have any fevers or chills.  He is currently on Augmentin for urinary tract infection.  He was prescribed the Augmentin yesterday.  His urine today reveals some nitrite and WBC and bacteria.  I will give him a dose of Rocephin and told him to continue the Augmentin.  [facility]  I told him to keep that appointment and follow-up for catheter check and then at that time they can tell him how long they would like the catheter to be in place.  Patient says he is feeling much better after the catheter placement as he no longer feels a sense of urinary retention.  Patient is not septic or toxic.  His vital signs are stable.  He is can go home with a leg bag.  At this time I think he is safe and stable for outpatient management discharge home.  He has excellent follow-up tomorrow at 9 AM with his urologist.  He has been given very strict return precautions and discharge instructions and  he understands treatment plan and follow-up.      2:56 PM - Patient seen and examined at bedside. Discussed plan of care, including consulting with Dr. Engel (Urology) and possible reinsertion of the catheter. Patient agrees to the plan of care. Ordered for labs to evaluate his symptoms.     3:27 PM - RN informed me that patient's bladder scan showed 422 cc of urine. He was able to void 50 cc of dark colored urine. Paged Urology.     3:54 PM - I discussed the patient's case and the above findings with Dr. Engel (Urology) who would like us to squirt 20 cc of 2% lidocaine Uro-jet into the urethra prior to an 18 Pashto regular catheter placement. After the balloon is inflated, he would like us to gently tug on the catheter to secure the balloon against the prosthetic urethra and to wrap a 4x4 on the urethral meatus to help tamponade any bleeding. Dr. Engel will then see the patient in his office next week. Patient was reevaluated at bedside and I updated him on the plan of care as well as laboratory results. He is agreeable. He will also be treated with morphine 4 mg injection for his pain and Zofran 4 mg injection for nausea.     6:39 PM - Patient will be treated with Rocephin 2,000 mg injection.    4:59 PM - I personally injected the lidocaine Uro-jet and assisted the nurse with schultz catheter placement. The catheter went in easily and there is orange tinged urine consistent with Pyridium use. Patient tolerated the procedure well. He has an appointment with [facility] [place] scheduled for 9:00 AM tomorrow. Patient had the opportunity to ask any questions. The plan for discharge was discussed with them and he was told to return for any new or worsening symptoms. He was also informed of the plans for follow up. Patient is understanding and agreeable to the plan for discharge.     ADDITIONAL PROBLEM LIST AND DISPOSITION  Problem #1: Bleeding from the urethra/penis after Schultz catheter removal with balloon  inflated.  Problem #2: Urinary retention               DISPOSITION AND DISCUSSIONS  I have discussed management of the patient with the following physicians and MARCIANO's: Dr. Engel (Urology).    Discussion of management with other [facility] or appropriate source(s): None.      Escalation of care considered, and ultimately not performed: acute inpatient care management, however at this time, the patient is most appropriate for outpatient management.  Urologist has asked me to place an 18 Urdu Garza catheter.  This was easily accomplished in the ER.  No need to go to the operating room or be admitted at this time.    Barriers to care at this time, including but not limited to:  None known .     Decision tools and prescription drugs considered including, but not limited to: Antibiotics patient is already on Augmentin.  He will continue the Augmentin as prescribed by his urologist. .    The patient will return for new or worsening symptoms and is stable at the time of discharge.    The patient is referred to a primary physician for blood pressure management, diabetic screening, and for all other preventative health concerns.    DISPOSITION:  Patient will be discharged home in stable condition.    FOLLOW UP:  [facility] [place] [address] [facility] [place] [address]  [phone]    Go on 7/5/2023  at 9am as scheduled    FINAL IMPRESSION   1. Garza catheter problem, initial encounter (HCC) Acute   2. Injury of urethra, initial encounter Acute     This dictation has been created using voice recognition software. The accuracy of the dictation is limited by the abilities of the software. I expect there may be some errors of grammar and possibly content. I made every attempt to manually correct the errors within my dictation. However, errors related to voice recognition software may still exist and should be interpreted within the appropriate context.     I, Essie Carter (Scribe), am scribing for, and in the presence of, Ksenia Love,  M.D..    Electronically signed by: Essie Carter (Scrlizzie), 7/4/2023    Ksneia LUNA M.D. personally performed the services described in this documentation, as scribed by Essie Carter in my presence, and it is both accurate and complete.     The note accurately reflects work and decisions made by me.  Ksenia Love M.D.  7/4/2023  5:31 PM

## 2023-07-04 NOTE — ED TRIAGE NOTES
"Chief Complaint   Patient presents with    Blood in Urine     Patient had surgery yesterday with urology and had a catheter placed. They gave him instructions on how to remove it but he and his wife were only able to empty about 15cc from catheter balloon so when they removed the catheter it was still partially inflated. He had some blood but is now unable to pass urine       Patient to triage ambulatory with a steady gait, AAOx4, Appropriate precautions in place.     Explained wait time and triage process. Placed back in lobby. Told to notify ED tech or RN of any changes, verbalized understanding.    BP (!) 159/87   Pulse 82   Temp 36 °C (96.8 °F) (Temporal)   Resp 16   Ht 1.753 m (5' 9\")   Wt (!) 126 kg (278 lb 14.1 oz)   SpO2 94%   BMI 41.18 kg/m²     "

## 2023-07-05 NOTE — DISCHARGE INSTRUCTIONS
Continue your Augmentin as prescribed.    Follow-up at [facility] [place] tomorrow at 9 AM as scheduled.    Return to the ER for any fevers over 100.4, shaking chills, decreased output from the urinary catheter, worsening abdominal pain or distention, nausea, vomiting, back pain, large clots in catheter, or for any concerns.

## 2023-07-05 NOTE — ED NOTES
18 Yi catheter placed with ERP. Ballon filled with 10cc's. Patient tolerated better than anticipated. Easy placement. 300cc's of dark blood urine returned.

## 2023-07-05 NOTE — ED NOTES
.Patient discharged per order. Oral and written discharge instructions reviewed. IV removed. All belongings accounted for and taken with patient. Questions answered, and patient agrees with discharge plan. Patient will follow up with urology.

## 2023-07-06 LAB
BACTERIA UR CULT: NORMAL
SIGNIFICANT IND 70042: NORMAL
SITE SITE: NORMAL
SOURCE SOURCE: NORMAL

## 2023-07-17 ENCOUNTER — APPOINTMENT (OUTPATIENT)
Dept: LAB | Facility: MEDICAL CENTER | Age: 68
End: 2023-07-17
Attending: FAMILY MEDICINE
Payer: MEDICARE

## 2023-07-17 DIAGNOSIS — N20.0 KIDNEY STONES: ICD-10-CM

## 2023-07-17 DIAGNOSIS — R63.5 WEIGHT GAIN: ICD-10-CM

## 2023-07-17 DIAGNOSIS — E55.9 VITAMIN D DEFICIENCY: ICD-10-CM

## 2023-07-17 DIAGNOSIS — E78.5 HYPERLIPIDEMIA, UNSPECIFIED HYPERLIPIDEMIA TYPE: ICD-10-CM

## 2023-07-17 DIAGNOSIS — R73.9 HYPERGLYCEMIA: ICD-10-CM

## 2023-07-17 LAB
25(OH)D3 SERPL-MCNC: 27 NG/ML (ref 30–100)
ALBUMIN SERPL BCP-MCNC: 4.2 G/DL (ref 3.2–4.9)
ALBUMIN/GLOB SERPL: 1.4 G/DL
ALP SERPL-CCNC: 85 U/L (ref 30–99)
ALT SERPL-CCNC: 26 U/L (ref 2–50)
ANION GAP SERPL CALC-SCNC: 13 MMOL/L (ref 7–16)
AST SERPL-CCNC: 13 U/L (ref 12–45)
BASOPHILS # BLD AUTO: 0.8 % (ref 0–1.8)
BASOPHILS # BLD: 0.07 K/UL (ref 0–0.12)
BILIRUB SERPL-MCNC: 0.4 MG/DL (ref 0.1–1.5)
BUN SERPL-MCNC: 20 MG/DL (ref 8–22)
CALCIUM ALBUM COR SERPL-MCNC: 9.9 MG/DL (ref 8.5–10.5)
CALCIUM SERPL-MCNC: 10.1 MG/DL (ref 8.5–10.5)
CHLORIDE SERPL-SCNC: 104 MMOL/L (ref 96–112)
CHOLEST SERPL-MCNC: 201 MG/DL (ref 100–199)
CO2 SERPL-SCNC: 24 MMOL/L (ref 20–33)
CREAT SERPL-MCNC: 1.05 MG/DL (ref 0.5–1.4)
EOSINOPHIL # BLD AUTO: 0.17 K/UL (ref 0–0.51)
EOSINOPHIL NFR BLD: 2 % (ref 0–6.9)
ERYTHROCYTE [DISTWIDTH] IN BLOOD BY AUTOMATED COUNT: 43.5 FL (ref 35.9–50)
EST. AVERAGE GLUCOSE BLD GHB EST-MCNC: 140 MG/DL
FASTING STATUS PATIENT QL REPORTED: NORMAL
GFR SERPLBLD CREATININE-BSD FMLA CKD-EPI: 78 ML/MIN/1.73 M 2
GLOBULIN SER CALC-MCNC: 2.9 G/DL (ref 1.9–3.5)
GLUCOSE SERPL-MCNC: 123 MG/DL (ref 65–99)
HBA1C MFR BLD: 6.5 % (ref 4–5.6)
HCT VFR BLD AUTO: 52.1 % (ref 42–52)
HDLC SERPL-MCNC: 58 MG/DL
HGB BLD-MCNC: 17 G/DL (ref 14–18)
IMM GRANULOCYTES # BLD AUTO: 0.03 K/UL (ref 0–0.11)
IMM GRANULOCYTES NFR BLD AUTO: 0.4 % (ref 0–0.9)
LDLC SERPL CALC-MCNC: 129 MG/DL
LYMPHOCYTES # BLD AUTO: 1.44 K/UL (ref 1–4.8)
LYMPHOCYTES NFR BLD: 16.9 % (ref 22–41)
MCH RBC QN AUTO: 29 PG (ref 27–33)
MCHC RBC AUTO-ENTMCNC: 32.6 G/DL (ref 32.3–36.5)
MCV RBC AUTO: 88.8 FL (ref 81.4–97.8)
MONOCYTES # BLD AUTO: 0.52 K/UL (ref 0–0.85)
MONOCYTES NFR BLD AUTO: 6.1 % (ref 0–13.4)
NEUTROPHILS # BLD AUTO: 6.29 K/UL (ref 1.82–7.42)
NEUTROPHILS NFR BLD: 73.8 % (ref 44–72)
NRBC # BLD AUTO: 0 K/UL
NRBC BLD-RTO: 0 /100 WBC (ref 0–0.2)
PLATELET # BLD AUTO: 239 K/UL (ref 164–446)
PMV BLD AUTO: 10.1 FL (ref 9–12.9)
POTASSIUM SERPL-SCNC: 4.9 MMOL/L (ref 3.6–5.5)
PROT SERPL-MCNC: 7.1 G/DL (ref 6–8.2)
RBC # BLD AUTO: 5.87 M/UL (ref 4.7–6.1)
SODIUM SERPL-SCNC: 141 MMOL/L (ref 135–145)
TRIGL SERPL-MCNC: 70 MG/DL (ref 0–149)
TSH SERPL DL<=0.005 MIU/L-ACNC: 1 UIU/ML (ref 0.38–5.33)
URATE SERPL-MCNC: 7.3 MG/DL (ref 2.5–8.3)
WBC # BLD AUTO: 8.5 K/UL (ref 4.8–10.8)

## 2023-07-17 PROCEDURE — 82306 VITAMIN D 25 HYDROXY: CPT

## 2023-07-17 PROCEDURE — 36415 COLL VENOUS BLD VENIPUNCTURE: CPT | Mod: GA

## 2023-07-17 PROCEDURE — 80061 LIPID PANEL: CPT

## 2023-07-17 PROCEDURE — 85025 COMPLETE CBC W/AUTO DIFF WBC: CPT

## 2023-07-17 PROCEDURE — 80053 COMPREHEN METABOLIC PANEL: CPT

## 2023-07-17 PROCEDURE — 84550 ASSAY OF BLOOD/URIC ACID: CPT

## 2023-07-17 PROCEDURE — 83036 HEMOGLOBIN GLYCOSYLATED A1C: CPT | Mod: GA

## 2023-07-17 PROCEDURE — 84443 ASSAY THYROID STIM HORMONE: CPT

## 2023-07-18 ENCOUNTER — HOSPITAL ENCOUNTER (OUTPATIENT)
Facility: MEDICAL CENTER | Age: 68
End: 2023-07-18
Attending: UROLOGY
Payer: MEDICARE

## 2023-07-18 PROCEDURE — 87086 URINE CULTURE/COLONY COUNT: CPT

## 2023-07-21 LAB
BACTERIA UR CULT: NORMAL
SIGNIFICANT IND 70042: NORMAL
SITE SITE: NORMAL
SOURCE SOURCE: NORMAL

## 2023-07-25 ENCOUNTER — OFFICE VISIT (OUTPATIENT)
Dept: MEDICAL GROUP | Facility: PHYSICIAN GROUP | Age: 68
End: 2023-07-25
Payer: MEDICARE

## 2023-07-25 VITALS
DIASTOLIC BLOOD PRESSURE: 72 MMHG | WEIGHT: 264.5 LBS | RESPIRATION RATE: 18 BRPM | TEMPERATURE: 98.3 F | BODY MASS INDEX: 39.18 KG/M2 | OXYGEN SATURATION: 95 % | HEIGHT: 69 IN | SYSTOLIC BLOOD PRESSURE: 144 MMHG | HEART RATE: 82 BPM

## 2023-07-25 DIAGNOSIS — Z11.59 NEED FOR HEPATITIS C SCREENING TEST: ICD-10-CM

## 2023-07-25 DIAGNOSIS — E78.5 HYPERLIPIDEMIA, UNSPECIFIED HYPERLIPIDEMIA TYPE: ICD-10-CM

## 2023-07-25 DIAGNOSIS — Z00.00 WELLNESS EXAMINATION: ICD-10-CM

## 2023-07-25 DIAGNOSIS — E55.9 VITAMIN D DEFICIENCY: ICD-10-CM

## 2023-07-25 DIAGNOSIS — Z13.6 SCREENING FOR AAA (ABDOMINAL AORTIC ANEURYSM): ICD-10-CM

## 2023-07-25 DIAGNOSIS — R73.9 HYPERGLYCEMIA: ICD-10-CM

## 2023-07-25 PROCEDURE — 3077F SYST BP >= 140 MM HG: CPT | Performed by: FAMILY MEDICINE

## 2023-07-25 PROCEDURE — 99214 OFFICE O/P EST MOD 30 MIN: CPT | Performed by: FAMILY MEDICINE

## 2023-07-25 PROCEDURE — 3078F DIAST BP <80 MM HG: CPT | Performed by: FAMILY MEDICINE

## 2023-07-25 RX ORDER — AZELASTINE HCL 205.5 UG/1
2 SPRAY NASAL DAILY
COMMUNITY
End: 2023-07-25

## 2023-07-25 RX ORDER — ZOLPIDEM TARTRATE 5 MG/1
TABLET ORAL
COMMUNITY
End: 2023-07-25

## 2023-07-25 RX ORDER — PHENAZOPYRIDINE HYDROCHLORIDE 100 MG/1
TABLET, FILM COATED ORAL
COMMUNITY
Start: 2023-07-03 | End: 2023-07-25

## 2023-07-25 ASSESSMENT — FIBROSIS 4 INDEX: FIB4 SCORE: 0.71

## 2023-07-25 NOTE — PROGRESS NOTES
"Subjective:     CC:   Chief Complaint   Patient presents with    Follow-Up       HPI:   Nando presents today for follow-up of his labs.  Patient does have appointment with sleep clinic coming up in August.  Patient is following up with urology due to his bladder cancer.    Past Medical History:   Diagnosis Date    Daytime sleepiness     Gasping for breath     sometimes    History of elevated glucose     RICHARD on CPAP 2022    Seasonal allergies 2022    Malachi    Snoring     DX RICHARD    Urinary bladder disorder     Bladder Cancer       Social History     Tobacco Use    Smoking status: Former     Packs/day: 0.50     Types: Cigarettes     Quit date:      Years since quittin.5    Smokeless tobacco: Never   Vaping Use    Vaping Use: Never used   Substance Use Topics    Alcohol use: Yes     Comment: Occ    Drug use: Yes     Types: Marijuana       Current Outpatient Medications Ordered in Epic   Medication Sig Dispense Refill    Multiple Vitamin (MULTIVITAMIN ADULT PO) Take  by mouth.      Azelastine HCl 0.15 % Solution Administer 2 Sprays into each nostril.      Fexofenadine HCl (ALLEGRA ALLERGY PO) Take  by mouth as needed.      B Complex Cap Take  by mouth every day.       No current [facility]-ordered facility-administered medications on file.       Allergies:  Sagebrush allergy skin test    Health Maintenance: Completed    ROS:  Gen: no fevers/chills, no changes in weight  Eyes: no changes in vision  ENT: no sore throat, no hearing loss, no bloody nose  Pulm: no sob, no cough  CV: no chest pain, no palpitations  GI: no nausea/vomiting, no diarrhea  : no dysuria  Neuro: no headaches, no numbness/tingling  Heme/Lymph: no easy bruising    Objective:     Exam:  BP (!) 144/72 (BP Location: Left arm, Patient Position: Sitting, BP Cuff Size: Large adult)   Pulse 82   Temp 36.8 °C (98.3 °F) (Temporal)   Resp 18   Ht 1.753 m (5' 9\")   Wt 120 kg (264 lb 8 oz)   SpO2 95%   BMI 39.06 kg/m²  Body mass index is " 39.06 kg/m².    Gen: Alert and oriented, No apparent distress.  Skin: Warm and dry.  No obvious lesions.  Eyes: Sclera wnl Pupils normal in size  Lungs: Normal effort, CTA bilaterally, no wheezes, rhonchi, or rales  CV: Regular rate and rhythm. No murmurs, rubs, or gallops.  Musculoskeletal: Normal gait. No extremity cyanosis, clubbing, or edema.  Neuro: Oriented to person, place and time  Psych: Mood is wnl       Assessment & Plan:     68 y.o. male with the following -     1. Hyperlipidemia, unspecified hyperlipidemia type  Patient would like to work on his diet we will repeat his labs again in 3 months did discuss with patient along with his wife with a low-fat diet is.  Patient's wife is practicing a low-fat diet apparently he eats different than she does.  - Lipid Profile; Future    2. Hyperglycemia  Avoid sweets and breads would repeat this again in 3 months  - Comp Metabolic Panel; Future  - HEMOGLOBIN A1C; Future    3. Vitamin D deficiency  I recommend patient taking vitamin D3 1000IUs/day we will repeat this in 3 months  - VITAMIN D,25 HYDROXY (DEFICIENCY); Future    4. Wellness examination  - COLOGUARD (FIT DNA)    5. Screening for AAA (abdominal aortic aneurysm)  - US-ABDOMINAL AORTA W/O DOPPLER; Future    6. Need for hepatitis C screening test  - HCV Scrn ( [id number] 1xLife - Medicare Patients Only); Future       Return in about 3 months (around 10/25/2023), or if symptoms worsen or fail to improve.    Please note that this dictation was created using voice recognition software. I have made every reasonable attempt to correct obvious errors, but I expect that there are errors of grammar and possibly content that I did not discover before finalizing the note.

## 2023-08-09 ENCOUNTER — HOSPITAL ENCOUNTER (OUTPATIENT)
Dept: RADIOLOGY | Facility: MEDICAL CENTER | Age: 68
End: 2023-08-09
Attending: FAMILY MEDICINE
Payer: MEDICARE

## 2023-08-09 DIAGNOSIS — Z13.6 SCREENING FOR AAA (ABDOMINAL AORTIC ANEURYSM): ICD-10-CM

## 2023-08-09 PROCEDURE — 76775 US EXAM ABDO BACK WALL LIM: CPT

## 2023-08-25 ENCOUNTER — OFFICE VISIT (OUTPATIENT)
Dept: SLEEP MEDICINE | Facility: MEDICAL CENTER | Age: 68
End: 2023-08-25
Attending: PREVENTIVE MEDICINE
Payer: MEDICARE

## 2023-08-25 VITALS
BODY MASS INDEX: 39.4 KG/M2 | DIASTOLIC BLOOD PRESSURE: 82 MMHG | RESPIRATION RATE: 16 BRPM | HEART RATE: 79 BPM | SYSTOLIC BLOOD PRESSURE: 142 MMHG | HEIGHT: 69 IN | WEIGHT: 266 LBS | OXYGEN SATURATION: 91 %

## 2023-08-25 DIAGNOSIS — G47.33 OSA TREATED WITH BIPAP: ICD-10-CM

## 2023-08-25 PROCEDURE — 3079F DIAST BP 80-89 MM HG: CPT | Performed by: PREVENTIVE MEDICINE

## 2023-08-25 PROCEDURE — 99214 OFFICE O/P EST MOD 30 MIN: CPT | Performed by: PREVENTIVE MEDICINE

## 2023-08-25 PROCEDURE — 3077F SYST BP >= 140 MM HG: CPT | Performed by: PREVENTIVE MEDICINE

## 2023-08-25 PROCEDURE — 99213 OFFICE O/P EST LOW 20 MIN: CPT | Performed by: PREVENTIVE MEDICINE

## 2023-08-25 ASSESSMENT — FIBROSIS 4 INDEX: FIB4 SCORE: 0.71

## 2023-08-25 NOTE — PROGRESS NOTES
CHIEF COMPLAINT: Compliance check/Annual Visit/First Compliance  LAST SEEN: Dr. Downey on 12/14/2021  HISTORY OF PRESENT ILLNESS:  Nando Nelson is a 68 y.o.male   who is here today to follow-up  for RICHARD.      COMPLIANCE DATA greater than 4 hours: 17 %  Machine type: Aircurve 10 Auto  Date range:  7/26-8/24/2023  AHI:  Elevated at 17.3  TIME USED:  6 hours 20 mins  PRESSURE SETTINGS:  max IPAP 17 min EPAP 13.  However, this leads to an elevated AI at 10.9 and elevated HI at 6.4  LEAK:  Acceptable at 1.8 L/min  DME:  [employer]  BLEED IN:  2 L O2 per min    This patient is using PAP therapy inconsistently   SLEEP STUDY RESULTS:  7/25/2021     This was a fully attended sleep study .   This test was technically adequate.  CPAP was tried from 6cm H2O to 15 cm H2O. Bipap was tried from 16/12 cm H2O to 18/14 cm H2O. Although it appears that he did  best on 18/14 cmH2O with an AHI of 4.44 as the last pressure of the night-- he had been tries on this exact pressure just 30 mins earlier and he had an AHI of 86.67.  This titration was not  adequate.       ASSESSMENT:     DIAGNOSTIC :  Severe  obstructive sleep apnea hypopnea - AHI 72.1  Severe Nocturnal desaturation - jinny saturation 68% - saturations <89% below for 139 minutes of TST.     TREATMENT :  Severe obstructive sleep apnea hypopnea - AHI 37.73  Severe Nocturnal desaturation - jinny saturation 78.0% - saturations <89% below for 105.5 minutes of TST.  This was an inadequate titration.     Patient was then referred to a full night titration with BiPAP.  He underwent that study on September 8, 2021.  See results below     Sleep study results:  TYPE: Full night titration with BiPAP  DATE: September 8, 2021  TREATMENT AHI: 6.49  TREATMENT Oxygen Jinny: 78.0% and patient spent 68.8 minutes under an SaO2 of 88%  TITRATION: Patient did best at an IPAP of 17 and EPAP of 13 cm water pressure.  He was that pressure for well over an hour and his AHI was 0.8.     This patient was  started on bilevel 17/13 as well as 2 L/min of oxygen due to his September 8, 2021 titration study significant desaturations.       Significant comorbidities and modifying factors: see below    PAST MEDICAL HISTORY:  Past Medical History:   Diagnosis Date    Daytime sleepiness     Gasping for breath     sometimes    History of elevated glucose     RICHARD on CPAP 09/27/2022    Seasonal allergies 09/27/2022    Malachi    Snoring     DX RICHARD    Urinary bladder disorder 2010    Bladder Cancer      PROBLEM LIST:  Patient Active Problem List    Diagnosis Date Noted    Hyperglycemia 06/21/2023    Weight gain 06/21/2023    Urinary bladder stone 06/21/2023    Lower urinary tract symptoms due to benign prostatic hyperplasia 05/09/2023    Sleep apnea 12/19/2012    Hyperlipidemia 10/05/2012    High alanine aminotransferase (ALT) level 09/11/2012    History of primary malignant neoplasm of urinary bladder 03/19/2012    Lesion of urinary bladder 08/26/2009    Severe obesity (BMI >= 40) (HCC) 08/24/2009     PAST SOCIAL HISTORY:  Past Surgical History:   Procedure Laterality Date    LUMBAR LAMINECTOMY DISKECTOMY  10/11/2022    Procedure: MINIMALLY INVASIVE RIGHT LUMBAR 4-5 MICRODISCECTOMY;  Surgeon: Elijah Ray M.D.;  Location: SURGERY [facility];  Service: Neurosurgery    CYST EXCISION      Shoulder Cyst    OTHER      Dental Implant Lower Left Jaw    OTHER      Bladder Tumor Removal     PAST FAMILY HISTORY:  Family History   Problem Relation Age of Onset    Lung Cancer Mother     Diabetes Father     Bladder cancer Father     ALS Father     Diabetes Sister     Sleep Apnea Sister      SOCIAL HISTORY:  Social History     Socioeconomic History    Marital status:      Spouse name: Not on file    Number of children: Not on file    Years of education: Not on file    Highest education level: Bachelor's degree (e.g., BA, AB, BS)   Occupational History    Not on file   Tobacco Use    Smoking status: Former     Current packs/day: 0.00      Types: Cigarettes     Quit date:      Years since quittin.6    Smokeless tobacco: Never   Vaping Use    Vaping Use: Never used   Substance and Sexual Activity    Alcohol use: Yes     Comment: Occ    Drug use: Yes     Types: Marijuana    Sexual activity: Not on file   Other Topics Concern    Not on file   Social History Narrative    Not on file     Social Determinants of Health     Financial Resource Strain: Low Risk  (2023)    Overall Financial Resource Strain (CARDIA)     Difficulty of Paying Living Expenses: Not very hard   Food Insecurity: No Food Insecurity (2023)    Hunger Vital Sign     Worried About Running Out of Food in the Last Year: Never true     Ran Out of Food in the Last Year: Never true   Transportation Needs: No Transportation Needs (2023)    PRAPARE - Transportation     Lack of Transportation (Medical): No     Lack of Transportation (Non-Medical): No   Physical Activity: Sufficiently Active (2023)    Exercise Vital Sign     Days of Exercise per Week: 4 days     Minutes of Exercise per Session: 80 min   Stress: No Stress Concern Present (2023)    [place] [employer] - Occupational Stress Questionnaire     Feeling of Stress : Not at all   Social Connections: Moderately Integrated (2023)    Social Connection and Isolation Panel [NHANES]     Frequency of Communication with Friends and Family: More than three times a week     Frequency of Social Gatherings with Friends and Family: Twice a week     Attends Taoism Services: 1 to 4 times per year     Active Member of Clubs or Organizations: No     Attends Club or Organization Meetings: Never     Marital Status:    Intimate Partner Violence: Not on file   Housing Stability: Low Risk  (2023)    Housing Stability Vital Sign     Unable to Pay for Housing in the Last Year: No     Number of Places Lived in the Last Year: 1     Unstable Housing in the Last Year: No     ALLERGIES:  "Sagebrush allergy skin test  MEDICATIONS:  Current Outpatient Medications   Medication Sig Dispense Refill    Multiple Vitamin (MULTIVITAMIN ADULT PO) Take  by mouth.      Azelastine HCl 0.15 % Solution Administer 2 Sprays into each nostril.      Fexofenadine HCl (ALLEGRA ALLERGY PO) Take  by mouth as needed.      B Complex Cap Take  by mouth every day.       No current facility-administered medications for this visit.    \"CURRENT RX\"    REVIEW OF SYSTEMS:  SEE HPI      PHYSICAL EXAM/VITALS:  BP (!) 142/82 (BP Location: Left arm, Patient Position: Sitting, BP Cuff Size: Large adult)   Pulse 79   Resp 16   Ht 1.753 m (5' 9\")   Wt 121 kg (266 lb)   SpO2 91%   BMI 39.28 kg/m²   Appearance: Well-nourished, well-developed,  looks stated age, no acute distress  Eyes:   EOMI  ENMT:  WNL  Neck: Supple, trachea midline  Respiratory effort:  No intercostal retractions or use of accessory muscles  Musculoskeletal:  Grossly normal; gait and station normal  Neurologic:  oriented to person, time, place, and purpose; judgement intact  Psychiatric:  No depression, anxiety, agitation    MEDICAL DECISION MAKING:  The medical record was reviewed as it pertains to this referral. This includes records from primary care,consultants notes, referral request, hospital records, labs and imaging. Any available diagnostic and titration nocturnal polysomnograms, home sleep apnea tests, continuous nocturnal oximetry results, multiple sleep latency tests, and recent compliance reports were reviewed with the patient.    ASSESSMENT/PLAN:  Nando Nelson is a 68 y.o.male who is using PAP therapy inconsistently.  This patient needs some pressures changes as follows:  The pressures were changed today:  Max IPAP 18, Min EPAP 14 and PS 4.  This changes were made to address the elevated apneas and hypopneas as seen on his compliance today.         DIAGNOSES :    1. RICHARD treated with BiPAP  - DME Other        The risks of untreated sleep apnea were " discussed with the patient at length. Patients with RICHARD are at increased risk of cardiovascular disease including coronary artery disease, systemic arterial hypertension, pulmonary arterial hypertension, cardiac arrhythmias, and stroke. RICHARD patients have an increased risk of motor vehicle accidents, type 2 diabetes, chronic kidney disease, and non-alcoholic liver disease. The patient was advised to avoid driving a motor vehicle when drowsy.  Have advised the patient to follow up with the appropriate healthcare practitioners for all other medical problems and issues.    RETURN TO CLINIC: Return in about 3 months (around 11/25/2023).    My total time spent caring for the patient on the day of the encounter was 40 minutes. This includes time spent on a thorough chart review including other physician notes, all sleep studies, as well as critical labs and pulmonary and cardiac studies.  Additionally, it includes a thorough discussion of good sleep hygiene and stimulus control, as well as  the need for consistency in terms of sleep preparation and practice.    Please note that this dictation was created using voice recognition software.  I have made every reasonable attempt to correct obvious errors, I expect that there are errors of grammar and possibly content that I did not discover before finalizing this note.

## 2023-10-25 ENCOUNTER — OFFICE VISIT (OUTPATIENT)
Dept: MEDICAL GROUP | Facility: PHYSICIAN GROUP | Age: 68
End: 2023-10-25
Payer: MEDICARE

## 2023-10-25 VITALS
SYSTOLIC BLOOD PRESSURE: 130 MMHG | HEART RATE: 69 BPM | OXYGEN SATURATION: 94 % | BODY MASS INDEX: 40.02 KG/M2 | HEIGHT: 69 IN | RESPIRATION RATE: 18 BRPM | WEIGHT: 270.2 LBS | DIASTOLIC BLOOD PRESSURE: 70 MMHG | TEMPERATURE: 97.9 F

## 2023-10-25 DIAGNOSIS — R73.9 HYPERGLYCEMIA: ICD-10-CM

## 2023-10-25 DIAGNOSIS — G47.30 SLEEP APNEA, UNSPECIFIED TYPE: ICD-10-CM

## 2023-10-25 DIAGNOSIS — E78.5 HYPERLIPIDEMIA, UNSPECIFIED HYPERLIPIDEMIA TYPE: ICD-10-CM

## 2023-10-25 DIAGNOSIS — I77.89 ENLARGEMENT OF ABDOMINAL AORTA (HCC): ICD-10-CM

## 2023-10-25 DIAGNOSIS — R63.5 WEIGHT GAIN: ICD-10-CM

## 2023-10-25 PROCEDURE — 3075F SYST BP GE 130 - 139MM HG: CPT | Performed by: FAMILY MEDICINE

## 2023-10-25 PROCEDURE — 99213 OFFICE O/P EST LOW 20 MIN: CPT | Performed by: FAMILY MEDICINE

## 2023-10-25 PROCEDURE — 3078F DIAST BP <80 MM HG: CPT | Performed by: FAMILY MEDICINE

## 2023-10-25 RX ORDER — TROSPIUM CHLORIDE ER 60 MG/1
CAPSULE ORAL
COMMUNITY
Start: 2023-10-19 | End: 2024-03-12

## 2023-10-25 RX ORDER — OXYBUTYNIN CHLORIDE 5 MG/1
TABLET, EXTENDED RELEASE ORAL
COMMUNITY
Start: 2023-08-04 | End: 2023-10-25

## 2023-10-25 ASSESSMENT — FIBROSIS 4 INDEX: FIB4 SCORE: 0.71

## 2023-10-25 NOTE — PROGRESS NOTES
Subjective:     CC:   Chief Complaint   Patient presents with    Follow-Up       HPI:   Nando presents today for follow-up.  Unfortunately he did not realize he needed to get fasting lab work done.  I did show him his note from this summer and it did mention ordering the lab work in 3 months and to follow-up with him.  Patient does have appointment with pulmonary coming up in November.  Patient feels he is doing well otherwise.  Also wanted to discuss the screening of his abdominal aorta.    Past Medical History:   Diagnosis Date    Daytime sleepiness     Gasping for breath     sometimes    History of elevated glucose     RICHARD on CPAP 2022    Seasonal allergies 2022    Malachi    Snoring     DX RICHARD    Urinary bladder disorder     Bladder Cancer       Social History     Tobacco Use    Smoking status: Former     Current packs/day: 0.00     Types: Cigarettes     Quit date:      Years since quittin.8    Smokeless tobacco: Never   Vaping Use    Vaping Use: Never used   Substance Use Topics    Alcohol use: Yes     Comment: Occ    Drug use: Yes     Types: Marijuana       Current Outpatient Medications Ordered in Epic   Medication Sig Dispense Refill    Trospium Chloride 60 MG CAPSULE SR 24 HR       VITAMIN D PO Take  by mouth.      Multiple Vitamin (MULTIVITAMIN ADULT PO) Take  by mouth.      Azelastine HCl 0.15 % Solution Administer 2 Sprays into each nostril.      Fexofenadine HCl (ALLEGRA ALLERGY PO) Take  by mouth as needed.      B Complex Cap Take  by mouth every day.       No current [facility]-ordered facility-administered medications on file.       Allergies:  Sagebrush allergy skin test    Health Maintenance: Completed    ROS:  Gen: no fevers/chills, patient has gained 4 pounds in 2 months  Eyes: no changes in vision  ENT: no sore throat, no hearing loss, no bloody nose  Pulm: no sob, no cough  CV: no chest pain, no palpitations  GI: no nausea/vomiting, no diarrhea  : no dysuria  Neuro: no  "headaches, no numbness/tingling  Heme/Lymph: no easy bruising    Objective:     Exam:  /70 (BP Location: Right arm, Patient Position: Sitting, BP Cuff Size: Large adult)   Pulse 69   Temp 36.6 °C (97.9 °F) (Temporal)   Resp 18   Ht 1.753 m (5' 9\")   Wt 123 kg (270 lb 3.2 oz)   SpO2 94%   BMI 39.90 kg/m²  Body mass index is 39.9 kg/m².    Gen: Alert and oriented, No apparent distress.  Skin: Warm and dry.  No obvious lesions.  Eyes: Sclera wnl Pupils normal in size  Lungs: Normal effort, CTA bilaterally, no wheezes, rhonchi, or rales  CV: Regular rate and rhythm. No murmurs, rubs, or gallops.  Musculoskeletal: Normal gait. No extremity cyanosis, clubbing, or edema.  Neuro: Oriented to person, place and time  Psych: Mood is wnl         Assessment & Plan:     68 y.o. male with the following -     1. Enlargement of abdominal aorta (HCC)  I would recommend referring him to vascular to have input if I need to recheck this again in a year or if there is any other work-up that needs to be done.  - Referral to Vascular Medicine    2. Hyperlipidemia, unspecified hyperlipidemia type  Patient has been focusing on his diet we will have him go ahead and do his lab work would like to see him back probably in early December if he can get his lab work done before Thanksgiving     3. Hyperglycemia  I will repeat his hemoglobin A1c    4. Weight gain  I did recommend patient avoid any weight gain.  I discussed with patient things to avoid.    5. Sleep apnea, unspecified type  Patient does have a appointment with pulmonary coming up at the end of November    Return in about 2 months (around 12/25/2023), or if symptoms worsen or fail to improve.    Please note that this dictation was created using voice recognition software. I have made every reasonable attempt to correct obvious errors, but I expect that there are errors of grammar and possibly content that I did not discover before finalizing the note.  "

## 2023-11-29 ENCOUNTER — OFFICE VISIT (OUTPATIENT)
Dept: SLEEP MEDICINE | Facility: MEDICAL CENTER | Age: 68
End: 2023-11-29
Attending: NURSE PRACTITIONER
Payer: MEDICARE

## 2023-11-29 VITALS
BODY MASS INDEX: 41.26 KG/M2 | OXYGEN SATURATION: 95 % | DIASTOLIC BLOOD PRESSURE: 82 MMHG | HEART RATE: 95 BPM | HEIGHT: 69 IN | RESPIRATION RATE: 16 BRPM | SYSTOLIC BLOOD PRESSURE: 140 MMHG | WEIGHT: 278.6 LBS

## 2023-11-29 DIAGNOSIS — G47.34 NOCTURNAL OXYGEN DESATURATION: ICD-10-CM

## 2023-11-29 DIAGNOSIS — G47.33 OSA TREATED WITH BIPAP: ICD-10-CM

## 2023-11-29 PROCEDURE — 3077F SYST BP >= 140 MM HG: CPT | Performed by: NURSE PRACTITIONER

## 2023-11-29 PROCEDURE — 3079F DIAST BP 80-89 MM HG: CPT | Performed by: NURSE PRACTITIONER

## 2023-11-29 PROCEDURE — 99214 OFFICE O/P EST MOD 30 MIN: CPT | Performed by: NURSE PRACTITIONER

## 2023-11-29 PROCEDURE — 99212 OFFICE O/P EST SF 10 MIN: CPT | Performed by: NURSE PRACTITIONER

## 2023-11-29 ASSESSMENT — FIBROSIS 4 INDEX: FIB4 SCORE: 0.71

## 2023-11-29 NOTE — PROGRESS NOTES
Chief Complaint   Patient presents with    Follow-Up     Last Seen 08/25/2023 DR. SETHI  3mo. F/v sleep       HPI:  Nando Nelson is a 68 y.o. year old male here today for follow-up on RICHARD on BiPAP with supplemental oxygen.  Last seen 8/25/2023.  General sleep study showed severe RICHARD with an AHI of 72.1 and severe nocturnal desaturation with O2 jinny of 68%.  Patient was less than 88% for 139 minutes of total sleep time.  He then presented for a titration study and was titrated on BiPAP with supplemental oxygen bleed in.    Patient is currently using a fullface mask and denies any difficulty with mask fit or pressures.  Patient uses supplemental oxygen bleed in at 2 L/min.  Denies any difficulty with mask fit, pressures, or airway dryness.  Averages 7 to 9 hours of sleep and denies any difficulty falling or staying asleep.  Overall notes improvement of sleep quality using BiPAP with supplemental oxygen.  Denies any excessive daytime sleepiness, morning headaches, palpitations, concentration or memory problems.    30-day compliance reviewed with patient shows 83% use with an average time of 7 hours and 10 minutes and patient currently on auto BiPAP 18/14/4 centimeters/H2O.  AHI is 10.4.  There is no evidence of persistent mask leak.    ROS: As per HPI and otherwise negative if not stated.    Past Medical History:   Diagnosis Date    Daytime sleepiness     Gasping for breath     sometimes    History of elevated glucose     RICHARD on CPAP 09/27/2022    Seasonal allergies 09/27/2022    Malachi    Snoring     DX RICHARD    Urinary bladder disorder 2010    Bladder Cancer       Past Surgical History:   Procedure Laterality Date    LUMBAR LAMINECTOMY DISKECTOMY  10/11/2022    Procedure: MINIMALLY INVASIVE RIGHT LUMBAR 4-5 MICRODISCECTOMY;  Surgeon: Elijah Ray M.D.;  Location: SURGERY [facility];  Service: Neurosurgery    CYST EXCISION      Shoulder Cyst    OTHER      Dental Implant Lower Left Jaw    OTHER      Bladder Tumor  "Removal       Family History   Problem Relation Age of Onset    Lung Cancer Mother     Diabetes Father     Bladder cancer Father     ALS Father     Diabetes Sister     Sleep Apnea Sister        Allergies as of 11/29/2023 - Reviewed 10/25/2023   Allergen Reaction Noted    Sagebrush allergy skin test Itching, Runny Nose, and Shortness of Breath 09/30/2019        Vitals:  BP (!) 140/82 (BP Location: Left arm, Patient Position: Sitting, BP Cuff Size: Adult)   Pulse 95   Resp 16   Ht 1.753 m (5' 9\")   Wt (!) 126 kg (278 lb 9.6 oz)   SpO2 95%     Current medications as of today   Current Outpatient Medications   Medication Sig Dispense Refill    Trospium Chloride 60 MG CAPSULE SR 24 HR       VITAMIN D PO Take  by mouth.      Multiple Vitamin (MULTIVITAMIN ADULT PO) Take  by mouth.      Azelastine HCl 0.15 % Solution Administer 2 Sprays into each nostril.      Fexofenadine HCl (ALLEGRA ALLERGY PO) Take  by mouth as needed.      B Complex Cap Take  by mouth every day.       No current facility-administered medications for this visit.         Physical Exam:   Gen:           Alert and oriented, No apparent distress. Mood and affect appropriate, normal interaction with examiner.  Eyes:          PERRL, EOM intact, sclere white, conjunctive moist.  Ears:          Not examined.   Hearing:     Grossly intact.  Nose:          Normal, no lesions or deformities.  Dentition:    Good dentition.  Oropharynx:   Tongue normal, posterior pharynx without erythema or exudate.  Neck:        Supple, trachea midline, no masses.  Respiratory Effort: No intercostal retractions or use of accessory muscles.   Lung Auscultation:      Clear to auscultation bilaterally; no rales, rhonchi or wheezing.  CV:            Regular rate and rhythm. No murmurs, rubs or gallops.  Abd:           Not examined.   Lymphadenopathy: Not examined.  Gait and Station: Normal.  Digits and Nails: No clubbing, cyanosis, petechiae, or nodes.   Cranial Nerves: II-XII " grossly intact.  Skin:        No rashes, lesions or ulcers noted.               Ext:           No cyanosis or edema.      Assessment:  1. RICHARD treated with BiPAP        2. Nocturnal oxygen desaturation          Plan:  Continues to use and benefit from BiPAP therapy with supplemental oxygen bleed in at 2 L/min.  Compliance shows adequate use of BiPAP.  Will adjust auto BiPAP as there is a slight elevation in AHI with a higher than normal apnea index.  Patient advised to message via [employer] if pressure change is intolerable.  Order placed for mask and supplies which will be good for 1 year.  Advise annual follow-up, but can be seen sooner if needed.    Please note that this dictation was created using voice recognition software. I have made every reasonable attempt to correct obvious errors, but it is possible there are errors of grammar and possibly content that I did not discover before finalizing the note.

## 2023-12-05 ENCOUNTER — HOSPITAL ENCOUNTER (OUTPATIENT)
Dept: LAB | Facility: MEDICAL CENTER | Age: 68
End: 2023-12-05
Attending: FAMILY MEDICINE
Payer: MEDICARE

## 2023-12-05 DIAGNOSIS — R73.9 HYPERGLYCEMIA: ICD-10-CM

## 2023-12-05 DIAGNOSIS — E55.9 VITAMIN D DEFICIENCY: ICD-10-CM

## 2023-12-05 DIAGNOSIS — Z11.59 NEED FOR HEPATITIS C SCREENING TEST: ICD-10-CM

## 2023-12-05 DIAGNOSIS — E78.5 HYPERLIPIDEMIA, UNSPECIFIED HYPERLIPIDEMIA TYPE: ICD-10-CM

## 2023-12-05 LAB
ALBUMIN SERPL BCP-MCNC: 4.1 G/DL (ref 3.2–4.9)
ALBUMIN/GLOB SERPL: 1.5 G/DL
ALP SERPL-CCNC: 78 U/L (ref 30–99)
ALT SERPL-CCNC: 34 U/L (ref 2–50)
ANION GAP SERPL CALC-SCNC: 11 MMOL/L (ref 7–16)
AST SERPL-CCNC: 23 U/L (ref 12–45)
BILIRUB SERPL-MCNC: 0.5 MG/DL (ref 0.1–1.5)
BUN SERPL-MCNC: 17 MG/DL (ref 8–22)
CALCIUM ALBUM COR SERPL-MCNC: 9.2 MG/DL (ref 8.5–10.5)
CALCIUM SERPL-MCNC: 9.3 MG/DL (ref 8.5–10.5)
CHLORIDE SERPL-SCNC: 104 MMOL/L (ref 96–112)
CHOLEST SERPL-MCNC: 210 MG/DL (ref 100–199)
CO2 SERPL-SCNC: 24 MMOL/L (ref 20–33)
CREAT SERPL-MCNC: 0.93 MG/DL (ref 0.5–1.4)
EST. AVERAGE GLUCOSE BLD GHB EST-MCNC: 148 MG/DL
FASTING STATUS PATIENT QL REPORTED: NORMAL
GFR SERPLBLD CREATININE-BSD FMLA CKD-EPI: 90 ML/MIN/1.73 M 2
GLOBULIN SER CALC-MCNC: 2.7 G/DL (ref 1.9–3.5)
GLUCOSE SERPL-MCNC: 135 MG/DL (ref 65–99)
HBA1C MFR BLD: 6.8 % (ref 4–5.6)
HDLC SERPL-MCNC: 60 MG/DL
LDLC SERPL CALC-MCNC: 137 MG/DL
POTASSIUM SERPL-SCNC: 4.5 MMOL/L (ref 3.6–5.5)
PROT SERPL-MCNC: 6.8 G/DL (ref 6–8.2)
SODIUM SERPL-SCNC: 139 MMOL/L (ref 135–145)
TRIGL SERPL-MCNC: 64 MG/DL (ref 0–149)

## 2023-12-05 PROCEDURE — G0472 HEP C SCREEN HIGH RISK/OTHER: HCPCS | Mod: GA

## 2023-12-05 PROCEDURE — 82306 VITAMIN D 25 HYDROXY: CPT

## 2023-12-05 PROCEDURE — 80061 LIPID PANEL: CPT

## 2023-12-05 PROCEDURE — 83036 HEMOGLOBIN GLYCOSYLATED A1C: CPT | Mod: GA

## 2023-12-05 PROCEDURE — 80053 COMPREHEN METABOLIC PANEL: CPT

## 2023-12-05 PROCEDURE — 36415 COLL VENOUS BLD VENIPUNCTURE: CPT | Mod: GA

## 2023-12-06 LAB
25(OH)D3 SERPL-MCNC: 29 NG/ML (ref 30–100)
HCV AB SER QL: NORMAL

## 2023-12-13 ENCOUNTER — OFFICE VISIT (OUTPATIENT)
Dept: MEDICAL GROUP | Facility: PHYSICIAN GROUP | Age: 68
End: 2023-12-13
Payer: MEDICARE

## 2023-12-13 VITALS
DIASTOLIC BLOOD PRESSURE: 64 MMHG | SYSTOLIC BLOOD PRESSURE: 156 MMHG | HEART RATE: 89 BPM | WEIGHT: 277.2 LBS | TEMPERATURE: 97.7 F | RESPIRATION RATE: 18 BRPM | OXYGEN SATURATION: 95 % | BODY MASS INDEX: 41.06 KG/M2 | HEIGHT: 69 IN

## 2023-12-13 DIAGNOSIS — E78.5 HYPERLIPIDEMIA, UNSPECIFIED HYPERLIPIDEMIA TYPE: ICD-10-CM

## 2023-12-13 DIAGNOSIS — R73.9 HYPERGLYCEMIA: ICD-10-CM

## 2023-12-13 DIAGNOSIS — G47.30 SLEEP APNEA, UNSPECIFIED TYPE: ICD-10-CM

## 2023-12-13 DIAGNOSIS — R63.5 WEIGHT GAIN: ICD-10-CM

## 2023-12-13 PROCEDURE — 99214 OFFICE O/P EST MOD 30 MIN: CPT | Performed by: FAMILY MEDICINE

## 2023-12-13 PROCEDURE — 3078F DIAST BP <80 MM HG: CPT | Performed by: FAMILY MEDICINE

## 2023-12-13 PROCEDURE — 3077F SYST BP >= 140 MM HG: CPT | Performed by: FAMILY MEDICINE

## 2023-12-13 RX ORDER — METFORMIN HYDROCHLORIDE 500 MG/1
500 TABLET, EXTENDED RELEASE ORAL DAILY
Qty: 30 TABLET | Refills: 1 | Status: SHIPPED | OUTPATIENT
Start: 2023-12-13 | End: 2024-02-05 | Stop reason: SDUPTHER

## 2023-12-13 RX ORDER — MIRABEGRON 50 MG/1
TABLET, FILM COATED, EXTENDED RELEASE ORAL
COMMUNITY
Start: 2023-12-07 | End: 2024-03-12

## 2023-12-13 ASSESSMENT — FIBROSIS 4 INDEX: FIB4 SCORE: 1.11

## 2023-12-13 NOTE — PROGRESS NOTES
Subjective:     CC:   Chief Complaint   Patient presents with    Follow-Up       HPI:   Nando presents today for follow-up of his labs.  His wife is with him at this time.  Patient states that he did see sleep clinic a couple weeks ago.  Patient is using his CPAP machine.  Patient is not having any concerns at this time.    Past Medical History:   Diagnosis Date    Daytime sleepiness     Gasping for breath     sometimes    History of elevated glucose     RICHARD on CPAP 2022    Seasonal allergies 2022    Malachi    Snoring     DX RICHARD    Urinary bladder disorder 2010    Bladder Cancer       Social History     Tobacco Use    Smoking status: Former     Current packs/day: 0.00     Types: Cigarettes     Quit date:      Years since quittin.9    Smokeless tobacco: Never   Vaping Use    Vaping Use: Never used   Substance Use Topics    Alcohol use: Yes     Comment: Occ    Drug use: Yes     Types: Marijuana       Current Outpatient Medications Ordered in Epic   Medication Sig Dispense Refill    Mirabegron ER (MYRBETRIQ) 50 MG TABLET SR 24 HR Take 1 tablet every day by oral route as directed for 30 days.      metFORMIN ER (GLUCOPHAGE XR) 500 MG TABLET SR 24 HR Take 1 Tablet by mouth every day for 60 days. 30 Tablet 1    Trospium Chloride 60 MG CAPSULE SR 24 HR       VITAMIN D PO Take  by mouth.      Multiple Vitamin (MULTIVITAMIN ADULT PO) Take  by mouth.      Azelastine HCl 0.15 % Solution Administer 2 Sprays into each nostril.      Fexofenadine HCl (ALLEGRA ALLERGY PO) Take  by mouth as needed.      B Complex Cap Take  by mouth every day.       No current [facility]-ordered facility-administered medications on file.       Allergies:  Sagebrush allergy skin test    Health Maintenance: Completed    ROS:  Gen: no fevers/chills, patient's weight has increased  Eyes: no changes in vision  ENT: no sore throat, no hearing loss, no bloody nose  Pulm: no sob, no cough  CV: no chest pain, no palpitations  GI: no  "nausea/vomiting, no diarrhea  : no dysuria  MSk: no myalgias  Skin: no rash  Neuro: no headaches, no numbness/tingling  Heme/Lymph: no easy bruising    Objective:     Exam:  BP (!) 156/64 (BP Location: Left arm, Patient Position: Sitting, BP Cuff Size: Large adult)   Pulse 89   Temp 36.5 °C (97.7 °F) (Temporal)   Resp 18   Ht 1.753 m (5' 9\")   Wt (!) 126 kg (277 lb 3.2 oz)   SpO2 95%   BMI 40.94 kg/m²  Body mass index is 40.94 kg/m².    Gen: Alert and oriented, No apparent distress.  Skin: Warm and dry.  No obvious lesions.  Eyes: Sclera wnl Pupils normal in size  Lungs: Normal effort, CTA bilaterally, no wheezes, rhonchi, or rales  CV: Regular rate and rhythm. No murmurs, rubs, or gallops.  Musculoskeletal: Normal gait. No extremity cyanosis, clubbing, or edema.  Neuro: Oriented to person, place and time  Psych: Mood is wnl       Assessment & Plan:     68 y.o. male with the following -     1. Hyperglycemia  I did go over his hemoglobin A1c which is elevated.  Patient would like to work on his diet but also agreeable to try low-dose metformin I should see him back in about 3 weeks for follow-up.    2. Sleep apnea, unspecified type  Patient to follow-up with sleep clinic as planned in another year    3. Weight gain  Patient to work on losing weight decreasing what he is eating portion control.    4. Hyperlipidemia, unspecified hyperlipidemia type  I would recommend repeating his lipid panel again in about 4 to 5 months he will still work on diet when he comes back along with seeing how his blood sugar is doing.    - metFORMIN ER (GLUCOPHAGE XR) 500 MG TABLET SR 24 HR; Take 1 Tablet by mouth every day for 60 days.  Dispense: 30 Tablet; Refill: 1       Return in about 4 weeks (around 1/10/2024), or if symptoms worsen or fail to improve.    Please note that this dictation was created using voice recognition software. I have made every reasonable attempt to correct obvious errors, but I expect that there are " errors of grammar and possibly content that I did not discover before finalizing the note.

## 2024-01-04 ENCOUNTER — HOSPITAL ENCOUNTER (OUTPATIENT)
Dept: LAB | Facility: MEDICAL CENTER | Age: 69
End: 2024-01-04
Attending: FAMILY MEDICINE
Payer: MEDICARE

## 2024-01-04 DIAGNOSIS — E55.9 VITAMIN D DEFICIENCY: ICD-10-CM

## 2024-01-04 PROCEDURE — 36415 COLL VENOUS BLD VENIPUNCTURE: CPT

## 2024-01-04 PROCEDURE — 82306 VITAMIN D 25 HYDROXY: CPT

## 2024-01-05 LAB — 25(OH)D3 SERPL-MCNC: 20 NG/ML (ref 30–100)

## 2024-01-08 ENCOUNTER — OFFICE VISIT (OUTPATIENT)
Dept: VASCULAR LAB | Facility: MEDICAL CENTER | Age: 69
End: 2024-01-08
Attending: FAMILY MEDICINE
Payer: MEDICARE

## 2024-01-08 VITALS
WEIGHT: 267 LBS | HEART RATE: 81 BPM | SYSTOLIC BLOOD PRESSURE: 145 MMHG | BODY MASS INDEX: 39.55 KG/M2 | DIASTOLIC BLOOD PRESSURE: 83 MMHG | HEIGHT: 69 IN

## 2024-01-08 DIAGNOSIS — R74.01 HIGH ALANINE AMINOTRANSFERASE (ALT) LEVEL: ICD-10-CM

## 2024-01-08 DIAGNOSIS — E78.49 OTHER HYPERLIPIDEMIA: ICD-10-CM

## 2024-01-08 DIAGNOSIS — R73.03 PREDIABETES: ICD-10-CM

## 2024-01-08 DIAGNOSIS — E66.9 OBESITY (BMI 30-39.9): ICD-10-CM

## 2024-01-08 DIAGNOSIS — I10 PRIMARY HYPERTENSION: ICD-10-CM

## 2024-01-08 DIAGNOSIS — I71.41 PARARENAL ABDOMINAL AORTIC ANEURYSM (AAA) WITHOUT RUPTURE (HCC): Chronic | ICD-10-CM

## 2024-01-08 DIAGNOSIS — I70.0 ABDOMINAL AORTIC ATHEROSCLEROSIS (HCC): ICD-10-CM

## 2024-01-08 DIAGNOSIS — G47.33 OSA ON CPAP: ICD-10-CM

## 2024-01-08 PROCEDURE — 3079F DIAST BP 80-89 MM HG: CPT | Performed by: FAMILY MEDICINE

## 2024-01-08 PROCEDURE — 99204 OFFICE O/P NEW MOD 45 MIN: CPT | Performed by: FAMILY MEDICINE

## 2024-01-08 PROCEDURE — 99212 OFFICE O/P EST SF 10 MIN: CPT

## 2024-01-08 PROCEDURE — 3077F SYST BP >= 140 MM HG: CPT | Performed by: FAMILY MEDICINE

## 2024-01-08 RX ORDER — ROSUVASTATIN CALCIUM 20 MG/1
20 TABLET, COATED ORAL EVERY EVENING
Qty: 90 TABLET | Refills: 3 | Status: SHIPPED | OUTPATIENT
Start: 2024-01-08

## 2024-01-08 RX ORDER — TELMISARTAN 20 MG/1
20 TABLET ORAL DAILY
Qty: 30 TABLET | Refills: 3 | Status: SHIPPED
Start: 2024-01-08 | End: 2024-03-07

## 2024-01-08 RX ORDER — ASPIRIN 81 MG/1
81 TABLET ORAL DAILY
Qty: 30 TABLET | COMMUNITY
Start: 2024-01-08

## 2024-01-08 ASSESSMENT — ENCOUNTER SYMPTOMS
FEVER: 0
PALPITATIONS: 0
WHEEZING: 0
COUGH: 0
ORTHOPNEA: 0
CLAUDICATION: 0
SPUTUM PRODUCTION: 0
CHILLS: 0
SHORTNESS OF BREATH: 0
ABDOMINAL PAIN: 0
PND: 0
HEMOPTYSIS: 0

## 2024-01-08 ASSESSMENT — FIBROSIS 4 INDEX: FIB4 SCORE: 1.11

## 2024-01-08 NOTE — PATIENT INSTRUCTIONS
SODIUM RESTRICTIONS:   - consume no more than 2,300mg of sodium daily (look at food labels) ,or if you have high BP then reduce to no more than 1,500mg of sodium daily   For more information visit: [email or web address]

## 2024-01-08 NOTE — PROGRESS NOTES
INITIAL VASCULAR MEDICINE VISIT  24     Nando Nelson is a 68 y.o. male  who presents for initial  initially referred by Lisa Otero M.D. for eval and med mgmt of AAA, est 2024     Subjective         Abdominal aortic aneurysm:  Type: proximal, suprarenal   Initial visit hx / sx:  seen by PCP 10/2023 after having screening AAA duplex showing AAA . Denies abd pain, distal ischemic limb symptoms such as discoloration, cool extremities, claudication.   Pertinent pmhx:  Initial sizing: 3.4cm on duplex 2023   Prior interventions: none   Prior ASCVD events: none   Fam hx of aneurysmal disease: no   reports that he quit smoking about 34 years ago. His smoking use included cigarettes. He has never used smokeless tobacco.     HTN:  Stable, tolerating meds, good adherence  Home BP los/80s    Hyperlipidemia:  Stable,   Current treatment: no current medications     Antiplatelet/anticoagulation: No, no hx of bleeding     Patient Active Problem List   Diagnosis    High alanine aminotransferase (ALT) level    Hyperlipidemia    Lesion of urinary bladder    History of primary malignant neoplasm of urinary bladder    Lower urinary tract symptoms due to benign prostatic hyperplasia    Severe obesity (BMI >= 40) (HCC)    RICHARD on CPAP    Hyperglycemia    Weight gain    Urinary bladder stone    Abdominal aortic atherosclerosis (HCC)    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)    Primary hypertension    Prediabetes      Past Surgical History:   Procedure Laterality Date    LUMBAR LAMINECTOMY DISKECTOMY  10/11/2022    Procedure: MINIMALLY INVASIVE RIGHT LUMBAR 4-5 MICRODISCECTOMY;  Surgeon: Elijah Ray M.D.;  Location: SURGERY [facility];  Service: Neurosurgery    CYST EXCISION      Shoulder Cyst    OTHER      Dental Implant Lower Left Jaw    OTHER      Bladder Tumor Removal        Current Outpatient Medications:     aspirin, 81 mg, Oral, DAILY    telmisartan, 20 mg, Oral, DAILY     "rosuvastatin, 20 mg, Oral, Q EVENING    Myrbetriq, Take 1 tablet every day by oral route as directed for 30 days., Taking    metFORMIN ER, 500 mg, Oral, DAILY, Taking    Trospium Chloride, , Taking    VITAMIN D PO, Take  by mouth., Taking    Multiple Vitamin (MULTIVITAMIN ADULT PO), Take  by mouth., Taking    Azelastine HCl, Administer 2 Sprays into each nostril., Taking    Fexofenadine HCl (ALLEGRA ALLERGY PO), Take  by mouth as needed., Taking    B Complex, Take  by mouth every day., Taking     Allergies   Allergen Reactions    Sagebrush Allergy Skin Test Itching, Runny Nose and Shortness of Breath       Family History   Problem Relation Age of Onset    Lung Cancer Mother     Diabetes Father     Bladder cancer Father     ALS Father     Diabetes Sister     Sleep Apnea Sister       Social History     Tobacco Use    Smoking status: Former     Current packs/day: 0.00     Types: Cigarettes     Quit date:      Years since quittin.0    Smokeless tobacco: Never   Vaping Use    Vaping Use: Never used   Substance Use Topics    Alcohol use: Yes     Comment: Occ    Drug use: Yes     Types: Marijuana     Review of Systems   Constitutional:  Negative for chills, fever and malaise/fatigue.   Respiratory:  Negative for cough, hemoptysis, sputum production, shortness of breath and wheezing.    Cardiovascular:  Negative for chest pain, palpitations, orthopnea, claudication, leg swelling and PND.   Gastrointestinal:  Negative for abdominal pain.           Objective   Vitals:    24 1032 24 1035   BP: (!) 163/79 (!) 145/83   BP Location: Left arm Left arm   Patient Position: Sitting Sitting   BP Cuff Size: Adult Adult   Pulse: 85 81   Weight: 121 kg (267 lb)    Height: 1.753 m (5' 9\")       BP Readings from Last 10 Encounters:   24 (!) 145/83   23 (!) 156/64   23 (!) 140/82   10/25/23 130/70   23 (!) 142/82   23 (!) 144/72   23 137/67   23 136/68   10/11/22 (!) 150/73 " "  12/14/21 160/90      Body mass index is 39.43 kg/m².  Wt Readings from Last 3 Encounters:   01/08/24 121 kg (267 lb)   12/13/23 (!) 126 kg (277 lb 3.2 oz)   11/29/23 (!) 126 kg (278 lb 9.6 oz)      Physical Exam  Vitals reviewed.   Constitutional:       General: He is not in acute distress.     Appearance: He is well-developed. He is not diaphoretic.   HENT:      Head: Normocephalic and atraumatic.   Neck:      Thyroid: No thyromegaly.      Vascular: No JVD.   Cardiovascular:      Rate and Rhythm: Normal rate and regular rhythm.      Pulses: Normal pulses.      Heart sounds: No murmur heard.  Pulmonary:      Effort: No respiratory distress.      Breath sounds: Normal breath sounds. No wheezing or rales.   Abdominal:      General: There is no distension.      Palpations: There is no mass.      Tenderness: There is no abdominal tenderness.   Musculoskeletal:         General: No swelling or tenderness.      Right lower leg: No edema.      Left lower leg: No edema.   Neurological:      General: No focal deficit present.      Mental Status: He is oriented to person, place, and time.      Cranial Nerves: No cranial nerve deficit.      Coordination: Coordination normal.   Psychiatric:         Mood and Affect: Mood normal.         Behavior: Behavior normal.       Lab Results   Component Value Date    CHOLSTRLTOT 210 (H) 12/05/2023     (H) 12/05/2023    HDL 60 12/05/2023    TRIGLYCERIDE 64 12/05/2023      No results found for: \"LIPOPROTA\"   No results found for: \"APOB\"    Lab Results   Component Value Date    PROTHROMBTM 12.9 06/15/2023    INR 0.98 06/15/2023       Lab Results   Component Value Date    HBA1C 6.8 (H) 12/05/2023      Lab Results   Component Value Date    SODIUM 139 12/05/2023    POTASSIUM 4.5 12/05/2023    CHLORIDE 104 12/05/2023    CO2 24 12/05/2023    GLUCOSE 135 (H) 12/05/2023    BUN 17 12/05/2023    CREATININE 0.93 12/05/2023        Lab Results   Component Value Date    WBC 8.5 07/17/2023    RBC " "5.87 2023    HEMOGLOBIN 17.0 2023    HEMATOCRIT 52.1 (H) 2023    MCV 88.8 2023    MCH 29.0 2023    MCHC 32.6 2023    MPV 10.1 2023     Lab Results   Component Value Date    HBA1C 6.8 (H) 2023      No results found for: \"MICROALBCALC\", \"MALBCRT\", \"MALBEXCR\", \"GQOESC21\", \"MICROALBUR\", \"MICRALB\", \"UMICROALBUM\", \"MICROALBTIM\"     VASCULAR IMAGING:  Results for orders placed or performed during the hospital encounter of 06/15/23   EKG - Clinic Performed   Result Value Ref Range    Report       [facility]    Test Date:  2023-06-15  Pt Name:    JAN JOHNSON                    Department: EKG  MRN:        [id number]                      Room:  Gender:     Male                         Technician: [facility]  :        [id number]                   Requested By:ANETA ALVAREZ  Order #:    [id number]                    Reading MD: Layton Stanley MD    Measurements  Intervals                                Axis  Rate:       73                           P:          42  WA:         198                          QRS:        -58  QRSD:       100                          T:          39  QT:         394  QTc:        435    Interpretive Statements  Sinus rhythm  Left ventricular hypertrophy  Inferior infarct, old  Anterior infarct, old  Electronically Signed On 6- 14:40:10 PDT by Layton Stanley MD       AAA screening duplex 2023   Mild prominence of the proximal abdominal aorta, measuring up to 3.4 cm.          Medical Decision Making:  Today's Assessment / Status / Plan:     1. 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 Abdominal aortic atherosclerosis (HCC)        3. Primary hypertension  telmisartan (MICARDIS) 20 MG tablet    CBC WITHOUT DIFFERENTIAL    Comp Metabolic Panel    Lipid Profile    " MICROALBUMIN CREAT RATIO URINE    Lipoprotein (a)      4. Other hyperlipidemia  telmisartan (MICARDIS) 20 MG tablet    rosuvastatin (CRESTOR) 20 MG Tab    Lipid Profile    Lipoprotein (a)      5. RICHARD on CPAP        6. Obesity (BMI 30-39.9)        7. High alanine aminotransferase (ALT) level        8. Prediabetes  Comp Metabolic Panel    HEMOGLOBIN A1C (Glycohemoglobin GHB Total/A1C with MBG Estimate)        Patient Type: Secondary Prevention    Etiology of Established CVD if Present:     1) 1) AAA, suprarenal - less common location - stable, no sx   8/2023 duplex - initial sizing = 3.4 , considered small   - no indications of co-morbid TAA , though no prior echo or CT chest   Med mgmt unless 5.5+cm or rapid expansion rate of 1+cm/year (or >0.5cm/6mo) - indications for surg mgmt, generally with EVAR   - at initial visit, reviewed pathophys and gave informational handouts   Plan:  - continue secondary prevention med mgmt including antiplatelet therapy  - check CTA abd for interval sizing now, then lifelong surveillance with annual Ao/iliac duplex, and consider CTA Q3-5yr or if rapid expansion noted   - reviewed emergency s/s including acute, severe or tearing abd pain, postprandial pain, or distal limb ischemic s/s that require immediate 911 or ED attention      BLOOD PRESSURE CONTROL   Office BP Goal ACC/AHA (2017) goal <130/80  Home BP at goal:  no  Office BP at goal:  no  24h ABPM:  not ordered to date   RDN candidate? NO  Contributing factors: n/a   Plan:   Monitoring:   - start/continue home BP monitoring, reviewed correct technique, provide BP log and instructions  - order 24h ABPM:  NO  - monitor lytes/gfr routinely   - contact office if BP consistently >140/>90 for discussion of tx adjustments   Medications:  - start telmisartan 20mg daily     LIPID MANAGEMENT:   Qualifies for Statin Therapy Based on 2018 ACC/AHA Guidelines: yes, Secondary Prevention - Very high risk ASCVD - 2 major events or 1 event with  other high-risk conditions  The 10-year ASCVD risk score (Jacques MERCER, et al., 2019) is: 19.6%  Major ASCVD events: Aortic aneurysm due to atherosclerosis   High-risk condition: >64yr old  and Hypertension   Currently on Statin: Started at this visit  Tx goals: LDL-C <55   At goal? no  Plan:   - reinforced ongoing TLC measures as noted   - monitor labs   Meds:   - start rosuvastatin 20mg daily   - add zetia next     ANTITHROMBOTIC/ANTIPLATELET THERAPY: start ASA 81mg daily     GLYCEMIC STATUS: Normal    LIFESTYLE INTERVENTIONS:    SMOKING: Stages of Change: Maintenance (sustained change >6mo)     reports that he quit smoking about 34 years ago. His smoking use included cigarettes. He has never used smokeless tobacco.   - continued complete avoidance of all tobacco products     PHYSICAL ACTIVITY: continue healthy activity to improve CV fitness.  In general, targeting >150min/week of moderate-level activity or as much as tolerated in light of functional status and co-morbidities   - avoid excessive lifting, limit to body weight, moderate intensity    WEIGHT MANAGEMENT AND NUTRITION: DASH style dietary approach , Focus on reduced sodium to <2,000mg per day , and Provide specific dietary approach handouts      OTHER: none     Instructed to follow-up with PCP for remainder of adult medical needs: yes  We will partner with other providers in the management of established vascular disease and cardiometabolic risk factors.    Studies to Be Obtained: CTA abdomen  - ordered, RN to track   Labs to Be Obtained: as noted in assessment    Follow up in: 2 months    Osvaldo Palacios M.D.  [facility]   [facility]   [phone]

## 2024-01-10 ENCOUNTER — OFFICE VISIT (OUTPATIENT)
Dept: MEDICAL GROUP | Facility: PHYSICIAN GROUP | Age: 69
End: 2024-01-10
Payer: MEDICARE

## 2024-01-10 VITALS
HEART RATE: 82 BPM | OXYGEN SATURATION: 94 % | DIASTOLIC BLOOD PRESSURE: 72 MMHG | BODY MASS INDEX: 39.58 KG/M2 | TEMPERATURE: 97.9 F | SYSTOLIC BLOOD PRESSURE: 140 MMHG | WEIGHT: 267.2 LBS | HEIGHT: 69 IN | RESPIRATION RATE: 18 BRPM

## 2024-01-10 DIAGNOSIS — I71.41 PARARENAL ABDOMINAL AORTIC ANEURYSM (AAA) WITHOUT RUPTURE (HCC): Chronic | ICD-10-CM

## 2024-01-10 DIAGNOSIS — R73.03 PREDIABETES: ICD-10-CM

## 2024-01-10 DIAGNOSIS — R63.5 WEIGHT GAIN: ICD-10-CM

## 2024-01-10 DIAGNOSIS — I10 PRIMARY HYPERTENSION: ICD-10-CM

## 2024-01-10 PROCEDURE — 3078F DIAST BP <80 MM HG: CPT | Performed by: FAMILY MEDICINE

## 2024-01-10 PROCEDURE — 99213 OFFICE O/P EST LOW 20 MIN: CPT | Performed by: FAMILY MEDICINE

## 2024-01-10 PROCEDURE — 3077F SYST BP >= 140 MM HG: CPT | Performed by: FAMILY MEDICINE

## 2024-01-10 ASSESSMENT — PATIENT HEALTH QUESTIONNAIRE - PHQ9: CLINICAL INTERPRETATION OF PHQ2 SCORE: 0

## 2024-01-10 ASSESSMENT — FIBROSIS 4 INDEX: FIB4 SCORE: 1.11

## 2024-01-10 NOTE — PROGRESS NOTES
Subjective:     CC:   Chief Complaint   Patient presents with    Follow-Up       HPI:   Nando presents today for follow-up patient did see vascular patient did have blood pressure medication added for his blood pressure patient will pick it up today.  Patient did hit do his fingerstick record it is ranging from 1 52-1 16.  Patient states that he does follow an amp and when he follows it he seems to kind to lose weight and he is focusing on 1500 to 1600 quynh/day.  Patient also states he walks about 5 times a week he does 2 and half miles an hour and 10 minutes.    Past Medical History:   Diagnosis Date    Daytime sleepiness     Gasping for breath     sometimes    History of elevated glucose     RICHARD on CPAP 2022    Seasonal allergies 2022    Malachi    Snoring     DX RICHARD    Urinary bladder disorder     Bladder Cancer       Social History     Tobacco Use    Smoking status: Former     Current packs/day: 0.00     Types: Cigarettes     Quit date:      Years since quittin.0    Smokeless tobacco: Never   Vaping Use    Vaping Use: Never used   Substance Use Topics    Alcohol use: Yes     Comment: Occ    Drug use: Yes     Types: Marijuana       Current Outpatient Medications Ordered in Epic   Medication Sig Dispense Refill    aspirin 81 MG EC tablet Take 1 Tablet by mouth every day. 30 Tablet     telmisartan (MICARDIS) 20 MG tablet Take 1 Tablet by mouth every day. 30 Tablet 3    rosuvastatin (CRESTOR) 20 MG Tab Take 1 Tablet by mouth every evening. 90 Tablet 3    Mirabegron ER (MYRBETRIQ) 50 MG TABLET SR 24 HR Take 1 tablet every day by oral route as directed for 30 days.      metFORMIN ER (GLUCOPHAGE XR) 500 MG TABLET SR 24 HR Take 1 Tablet by mouth every day for 60 days. 30 Tablet 1    Trospium Chloride 60 MG CAPSULE SR 24 HR       VITAMIN D PO Take  by mouth.      Multiple Vitamin (MULTIVITAMIN ADULT PO) Take  by mouth.      Azelastine HCl 0.15 % Solution Administer 2 Sprays into each nostril.       "Fexofenadine HCl (ALLEGRA ALLERGY PO) Take  by mouth as needed.      B Complex Cap Take  by mouth every day.       No current Epic-ordered facility-administered medications on file.       Allergies:  Sagebrush allergy skin test    Health Maintenance: Completed    ROS:  Gen: no fevers/chills, looks like patient's lost 10 pounds in about a month  Eyes: no changes in vision  ENT: no sore throat, no hearing loss, no bloody nose  Pulm: no sob, no cough  CV: no chest pain, no palpitations  GI: no nausea/vomiting, no diarrhea  : no dysuria  Neuro: no headaches, no numbness/tingling  Heme/Lymph: no easy bruising    Objective:     Exam:  BP (!) 140/72 (BP Location: Left arm, Patient Position: Sitting, BP Cuff Size: Large adult)   Pulse 82   Temp 36.6 °C (97.9 °F) (Temporal)   Resp 18   Ht 1.753 m (5' 9\")   Wt 121 kg (267 lb 3.2 oz)   SpO2 94%   BMI 39.46 kg/m²  Body mass index is 39.46 kg/m².    Gen: Alert and oriented, No apparent distress.  Skin: Warm and dry.  No obvious lesions.  Eyes: Sclera wnl Pupils normal in size  Lungs: Normal effort, CTA bilaterally, no wheezes, rhonchi, or rales  CV: Regular rate and rhythm. No murmurs, rubs, or gallops.  Musculoskeletal: Normal gait. No extremity cyanosis, clubbing, or edema.  Neuro: Oriented to person, place and time  Psych: Mood is wnl       Assessment & Plan:     68 y.o. male with the following -     1. Primary hypertension  I would recommend coming in for a blood pressure check sometime in February patient to bring his blood pressure cuff from at home with him so we can check the accuracy.    2. Prediabetes  I did discuss with patient increasing his metformin at this time he wants to focus on his eating and would like to wait it looks like vascular did order another hemoglobin A1c on him in March.  I did recommend I need to see him back after vascular sees him.    3. 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 Weight gain  Patient has lost weight would recommend that he try to increase his pace when he walks.  Also mentions that the [employer] diabetes [employer] does have portion of control plates available that he can order online.    5. BMI 39.0-39.9,adult       Return in about 4 weeks (around 2/7/2024), or if symptoms worsen or fail to improve.    Please note that this dictation was created using voice recognition software. I have made every reasonable attempt to correct obvious errors, but I expect that there are errors of grammar and possibly content that I did not discover before finalizing the note.

## 2024-02-07 RX ORDER — METFORMIN HYDROCHLORIDE 500 MG/1
500 TABLET, EXTENDED RELEASE ORAL DAILY
Qty: 30 TABLET | Refills: 1 | Status: SHIPPED | OUTPATIENT
Start: 2024-02-07 | End: 2024-03-12 | Stop reason: SDUPTHER

## 2024-02-12 ENCOUNTER — HOSPITAL ENCOUNTER (OUTPATIENT)
Dept: LAB | Facility: MEDICAL CENTER | Age: 69
End: 2024-02-12
Attending: FAMILY MEDICINE
Payer: MEDICARE

## 2024-02-12 DIAGNOSIS — I10 PRIMARY HYPERTENSION: ICD-10-CM

## 2024-02-12 DIAGNOSIS — R73.03 PREDIABETES: ICD-10-CM

## 2024-02-12 DIAGNOSIS — E78.49 OTHER HYPERLIPIDEMIA: ICD-10-CM

## 2024-02-12 LAB
ALBUMIN SERPL BCP-MCNC: 4 G/DL (ref 3.2–4.9)
ALBUMIN/GLOB SERPL: 1.4 G/DL
ALP SERPL-CCNC: 79 U/L (ref 30–99)
ALT SERPL-CCNC: 29 U/L (ref 2–50)
ANION GAP SERPL CALC-SCNC: 12 MMOL/L (ref 7–16)
AST SERPL-CCNC: 25 U/L (ref 12–45)
BILIRUB SERPL-MCNC: 0.5 MG/DL (ref 0.1–1.5)
BUN SERPL-MCNC: 20 MG/DL (ref 8–22)
CALCIUM ALBUM COR SERPL-MCNC: 9.5 MG/DL (ref 8.5–10.5)
CALCIUM SERPL-MCNC: 9.5 MG/DL (ref 8.5–10.5)
CHLORIDE SERPL-SCNC: 105 MMOL/L (ref 96–112)
CHOLEST SERPL-MCNC: 121 MG/DL (ref 100–199)
CO2 SERPL-SCNC: 23 MMOL/L (ref 20–33)
CREAT SERPL-MCNC: 0.98 MG/DL (ref 0.5–1.4)
CREAT UR-MCNC: 192.77 MG/DL
ERYTHROCYTE [DISTWIDTH] IN BLOOD BY AUTOMATED COUNT: 43.4 FL (ref 35.9–50)
FASTING STATUS PATIENT QL REPORTED: NORMAL
GFR SERPLBLD CREATININE-BSD FMLA CKD-EPI: 84 ML/MIN/1.73 M 2
GLOBULIN SER CALC-MCNC: 2.8 G/DL (ref 1.9–3.5)
GLUCOSE SERPL-MCNC: 127 MG/DL (ref 65–99)
HCT VFR BLD AUTO: 47 % (ref 42–52)
HDLC SERPL-MCNC: 57 MG/DL
HGB BLD-MCNC: 15.2 G/DL (ref 14–18)
LDLC SERPL CALC-MCNC: 55 MG/DL
MCH RBC QN AUTO: 28.5 PG (ref 27–33)
MCHC RBC AUTO-ENTMCNC: 32.3 G/DL (ref 32.3–36.5)
MCV RBC AUTO: 88.2 FL (ref 81.4–97.8)
MICROALBUMIN UR-MCNC: 1.7 MG/DL
MICROALBUMIN/CREAT UR: 9 MG/G (ref 0–30)
PLATELET # BLD AUTO: 195 K/UL (ref 164–446)
PMV BLD AUTO: 10 FL (ref 9–12.9)
POTASSIUM SERPL-SCNC: 4.5 MMOL/L (ref 3.6–5.5)
PROT SERPL-MCNC: 6.8 G/DL (ref 6–8.2)
RBC # BLD AUTO: 5.33 M/UL (ref 4.7–6.1)
SODIUM SERPL-SCNC: 140 MMOL/L (ref 135–145)
TRIGL SERPL-MCNC: 47 MG/DL (ref 0–149)
WBC # BLD AUTO: 7.1 K/UL (ref 4.8–10.8)

## 2024-02-12 PROCEDURE — 82043 UR ALBUMIN QUANTITATIVE: CPT

## 2024-02-12 PROCEDURE — 80061 LIPID PANEL: CPT

## 2024-02-12 PROCEDURE — 83695 ASSAY OF LIPOPROTEIN(A): CPT

## 2024-02-12 PROCEDURE — 82570 ASSAY OF URINE CREATININE: CPT

## 2024-02-12 PROCEDURE — 36415 COLL VENOUS BLD VENIPUNCTURE: CPT

## 2024-02-12 PROCEDURE — 80053 COMPREHEN METABOLIC PANEL: CPT

## 2024-02-12 PROCEDURE — 85027 COMPLETE CBC AUTOMATED: CPT

## 2024-02-13 ENCOUNTER — OFFICE VISIT (OUTPATIENT)
Dept: MEDICAL GROUP | Facility: PHYSICIAN GROUP | Age: 69
End: 2024-02-13
Payer: MEDICARE

## 2024-02-13 VITALS
BODY MASS INDEX: 40.32 KG/M2 | SYSTOLIC BLOOD PRESSURE: 134 MMHG | DIASTOLIC BLOOD PRESSURE: 66 MMHG | HEART RATE: 74 BPM | HEIGHT: 69 IN | TEMPERATURE: 97.2 F | OXYGEN SATURATION: 94 % | WEIGHT: 272.2 LBS | RESPIRATION RATE: 18 BRPM

## 2024-02-13 DIAGNOSIS — I10 PRIMARY HYPERTENSION: ICD-10-CM

## 2024-02-13 DIAGNOSIS — R73.03 PREDIABETES: ICD-10-CM

## 2024-02-13 PROCEDURE — 99213 OFFICE O/P EST LOW 20 MIN: CPT | Performed by: FAMILY MEDICINE

## 2024-02-13 PROCEDURE — 3078F DIAST BP <80 MM HG: CPT | Performed by: FAMILY MEDICINE

## 2024-02-13 PROCEDURE — 3075F SYST BP GE 130 - 139MM HG: CPT | Performed by: FAMILY MEDICINE

## 2024-02-13 RX ORDER — VIBEGRON 75 MG/1
TABLET, FILM COATED ORAL
COMMUNITY
Start: 2024-02-08

## 2024-02-13 ASSESSMENT — FIBROSIS 4 INDEX: FIB4 SCORE: 1.62

## 2024-02-13 NOTE — PROGRESS NOTES
Subjective:     CC:   Chief Complaint   Patient presents with    Follow-Up       HPI:   Nando presents today for follow-up.  Patient does have appointment with cardiology coming up.  The cardiologist wants his blood pressure lower.  Patient has been taking his blood pressure at home he brought his blood pressure cuff in we got 134/66 his blood pressure cuff registered 156/91.  Patient when he takes his blood pressure at home he is in his recliner and not sitting in a chair with his feet to the ground.  My MA also states that his blood pressure cuff was around his elbow area.  I would recommend that he bring his blood pressure cuff in when he sees cardiology at his appointment.  Patient also notices his blood sugar little bit higher in the morning around 150s and it registers about 100 in the evening.    Past Medical History:   Diagnosis Date    Daytime sleepiness     Gasping for breath     sometimes    History of elevated glucose     RICHARD on CPAP 2022    Seasonal allergies 2022    Malachi    Snoring     DX RICHARD    Urinary bladder disorder     Bladder Cancer       Social History     Tobacco Use    Smoking status: Former     Current packs/day: 0.00     Types: Cigarettes     Quit date:      Years since quittin.1    Smokeless tobacco: Never   Vaping Use    Vaping Use: Never used   Substance Use Topics    Alcohol use: Yes     Comment: Occ    Drug use: Yes     Types: Marijuana       Current Outpatient Medications Ordered in Epic   Medication Sig Dispense Refill    vibegron (GEMTESA) 75 MG tablet Take 1 tablet every day by oral route as directed for 42 days.      metFORMIN ER (GLUCOPHAGE XR) 500 MG TABLET SR 24 HR Take 1 Tablet by mouth every day for 60 days. 30 Tablet 1    aspirin 81 MG EC tablet Take 1 Tablet by mouth every day. 30 Tablet     telmisartan (MICARDIS) 20 MG tablet Take 1 Tablet by mouth every day. 30 Tablet 3    rosuvastatin (CRESTOR) 20 MG Tab Take 1 Tablet by mouth every evening. 90  "Tablet 3    Mirabegron ER (MYRBETRIQ) 50 MG TABLET SR 24 HR Take 1 tablet every day by oral route as directed for 30 days.      Trospium Chloride 60 MG CAPSULE SR 24 HR       VITAMIN D PO Take  by mouth.      Multiple Vitamin (MULTIVITAMIN ADULT PO) Take  by mouth.      Azelastine HCl 0.15 % Solution Administer 2 Sprays into each nostril.      Fexofenadine HCl (ALLEGRA ALLERGY PO) Take  by mouth as needed.      B Complex Cap Take  by mouth every day.       No current [facility]-ordered facility-administered medications on file.       Allergies:  S[place] allergy skin test    Health Maintenance: Completed    ROS:  Gen: no fevers/chills, patient has gained weight since of last seen him  Eyes: no changes in vision  ENT: no sore throat, no hearing loss, no bloody nose  Pulm: no sob, no cough  CV: no chest pain, no palpitations  GI: no nausea/vomiting, no diarrhea  : no dysuria  Neuro: no headaches, no numbness/tingling  Heme/Lymph: no easy bruising    Objective:     Exam:  /66 (BP Location: Left arm, Patient Position: Sitting, BP Cuff Size: Large adult)   Pulse 74   Temp 36.2 °C (97.2 °F) (Temporal)   Resp 18   Ht 1.753 m (5' 9\")   Wt 123 kg (272 lb 3.2 oz)   SpO2 94%   BMI 40.20 kg/m²  Body mass index is 40.2 kg/m².    Gen: Alert and oriented, No apparent distress.  Skin: Warm and dry.  No obvious lesions.  Eyes: Sclera wnl Pupils normal in size  Lungs: Normal effort, CTA bilaterally, no wheezes, rhonchi, or rales  CV: Regular rate and rhythm. No murmurs, rubs, or gallops.  ABD: Soft non-tender no organomegaly  Musculoskeletal: Normal gait. No extremity cyanosis, clubbing, or edema.  Neuro: Oriented to person, place and time  Psych: Mood is wnl         Assessment & Plan:     69 y.o. male with the following -     1. Prediabetes  I would recommend seeing him back next month he will be due for his hemoglobin A1c we can do it at the clinic.  May need to consider increasing his metformin or possibly consideration " of one of the injectables.    2. Primary hypertension  I would recommend patient bring his blood pressure cuff in when he sees cardiology.  Also talked to him about the correct way of taking the blood pressure at home.      Return in about 4 weeks (around 3/12/2024), or if symptoms worsen or fail to improve.    Please note that this dictation was created using voice recognition software. I have made every reasonable attempt to correct obvious errors, but I expect that there are errors of grammar and possibly content that I did not discover before finalizing the note.

## 2024-02-14 LAB — LPA SERPL-MCNC: <6 MG/DL

## 2024-02-27 ENCOUNTER — APPOINTMENT (OUTPATIENT)
Dept: RADIOLOGY | Facility: MEDICAL CENTER | Age: 69
End: 2024-02-27
Attending: FAMILY MEDICINE
Payer: MEDICARE

## 2024-03-01 ENCOUNTER — HOSPITAL ENCOUNTER (OUTPATIENT)
Dept: RADIOLOGY | Facility: MEDICAL CENTER | Age: 69
End: 2024-03-01
Attending: FAMILY MEDICINE
Payer: MEDICARE

## 2024-03-01 ENCOUNTER — TELEPHONE (OUTPATIENT)
Dept: VASCULAR LAB | Facility: MEDICAL CENTER | Age: 69
End: 2024-03-01

## 2024-03-01 DIAGNOSIS — I71.41 PARARENAL ABDOMINAL AORTIC ANEURYSM (AAA) WITHOUT RUPTURE (HCC): Chronic | ICD-10-CM

## 2024-03-01 PROCEDURE — 74174 CTA ABD&PLVS W/CONTRAST: CPT

## 2024-03-01 PROCEDURE — 700117 HCHG RX CONTRAST REV CODE 255: Performed by: FAMILY MEDICINE

## 2024-03-01 RX ADMIN — IOHEXOL 100 ML: 350 INJECTION, SOLUTION INTRAVENOUS at 15:54

## 2024-03-01 NOTE — TELEPHONE ENCOUNTER
Established patient  Chart prep for upcoming appointment with Dr. Palacios    Any pending/incomplete orders from last visit? Yes Imaging, patient reminded to try to complete prior to appointment  Were any records requested?  No  Correct appointment type (New/Established/virtual)? Yes  Referral up to date? Yes  Referral attached to appointment (renewals and New patients only)? N/A (established with up-to-date referral)

## 2024-03-05 ENCOUNTER — DOCUMENTATION (OUTPATIENT)
Dept: VASCULAR LAB | Facility: MEDICAL CENTER | Age: 69
End: 2024-03-05
Payer: MEDICARE

## 2024-03-05 NOTE — PROGRESS NOTES
CTA abd/pelvis reviewed indicating abd ao size = 2.8cm max    Prior ao/iliac duplex showed 3.4cm and CTA is more accurate, so no longer has AAA but mild dilation only.      Pending f/u to review  We may consider a repeat AAA duplex in 2-3yrs.

## 2024-03-07 ENCOUNTER — TELEPHONE (OUTPATIENT)
Dept: VASCULAR LAB | Facility: MEDICAL CENTER | Age: 69
End: 2024-03-07

## 2024-03-07 ENCOUNTER — OFFICE VISIT (OUTPATIENT)
Dept: VASCULAR LAB | Facility: MEDICAL CENTER | Age: 69
End: 2024-03-07
Attending: FAMILY MEDICINE
Payer: MEDICARE

## 2024-03-07 VITALS
HEIGHT: 69 IN | BODY MASS INDEX: 39.55 KG/M2 | DIASTOLIC BLOOD PRESSURE: 81 MMHG | HEART RATE: 71 BPM | WEIGHT: 267 LBS | SYSTOLIC BLOOD PRESSURE: 150 MMHG

## 2024-03-07 DIAGNOSIS — R73.03 PREDIABETES: ICD-10-CM

## 2024-03-07 DIAGNOSIS — E66.9 OBESITY (BMI 30-39.9): ICD-10-CM

## 2024-03-07 DIAGNOSIS — E78.49 OTHER HYPERLIPIDEMIA: ICD-10-CM

## 2024-03-07 DIAGNOSIS — I77.811 ABDOMINAL AORTIC ECTASIA (HCC): Chronic | ICD-10-CM

## 2024-03-07 DIAGNOSIS — G47.33 OSA ON CPAP: ICD-10-CM

## 2024-03-07 DIAGNOSIS — R03.0 ELEVATED BLOOD PRESSURE READING WITHOUT DIAGNOSIS OF HYPERTENSION: ICD-10-CM

## 2024-03-07 DIAGNOSIS — I10 PRIMARY HYPERTENSION: ICD-10-CM

## 2024-03-07 PROCEDURE — 3079F DIAST BP 80-89 MM HG: CPT | Performed by: FAMILY MEDICINE

## 2024-03-07 PROCEDURE — 3077F SYST BP >= 140 MM HG: CPT | Performed by: FAMILY MEDICINE

## 2024-03-07 PROCEDURE — 99212 OFFICE O/P EST SF 10 MIN: CPT

## 2024-03-07 PROCEDURE — 99214 OFFICE O/P EST MOD 30 MIN: CPT | Performed by: FAMILY MEDICINE

## 2024-03-07 RX ORDER — TELMISARTAN 40 MG/1
40 TABLET ORAL DAILY
Qty: 90 TABLET | Refills: 3 | Status: SHIPPED | OUTPATIENT
Start: 2024-03-07

## 2024-03-07 ASSESSMENT — ENCOUNTER SYMPTOMS
WHEEZING: 0
PND: 0
PALPITATIONS: 0
HEMOPTYSIS: 0
FEVER: 0
SPUTUM PRODUCTION: 0
ORTHOPNEA: 0
CHILLS: 0
COUGH: 0
SHORTNESS OF BREATH: 0
ABDOMINAL PAIN: 0
CLAUDICATION: 0

## 2024-03-07 ASSESSMENT — FIBROSIS 4 INDEX: FIB4 SCORE: 1.62

## 2024-03-07 NOTE — TELEPHONE ENCOUNTER
Called and left message to schedule a 24 hour blood pressure monitor.  Next available. Mon or Wednesday  9AM - 3PM ONLY.     Please contact Katherine Moran, Med Ass't to schedule.    Katherine Moran, Med Ass't  [facility]  Ph. [phone]  Fx. [phone]

## 2024-03-07 NOTE — PROGRESS NOTES
FOLLOW-UP VASCULAR MEDICINE VISIT  24     Nando Nelson is a male  who presents for f/u   initially referred by Lisa Otero M.D. for eval and med mgmt of AAA, est 2024     Subjective       Interval hx/concerns: seen 2024, had CTA abd, no other new sx.     Abdominal aortic dilation (prior thought to be aneurysm):  Current CTA imaging shows 2.8cm, down from prior duplex showing 3.4cm.  no new sx.   Pertinent pmhx:  Initial sizing: 3.4cm on duplex 2023   Prior interventions: none   Prior ASCVD events: none   Fam hx of aneurysmal disease: no   reports that he quit smoking about 34 years ago. His smoking use included cigarettes. He has never used smokeless tobacco.     HTN: Stable, tolerating meds, good adherence, Home BP lo-130s/70-80  Today measurement with home BP cuff 155/80s     Hyperlipidemia: Stable, Current treatment: rosuvastatin 20mg - tolerating     Antiplatelet/anticoagulation: No, no hx of bleeding        Current Outpatient Medications:     telmisartan, 40 mg, Oral, DAILY    Gemtesa, Take 1 tablet every day by oral route as directed for 42 days., Taking    metFORMIN ER, 500 mg, Oral, DAILY, Taking    aspirin, 81 mg, Oral, DAILY, Taking    rosuvastatin, 20 mg, Oral, Q EVENING, Taking    VITAMIN D PO, Take  by mouth., Taking    Multiple Vitamin (MULTIVITAMIN ADULT PO), Take  by mouth., Taking    Azelastine HCl, Administer 2 Sprays into each nostril., Taking    Fexofenadine HCl (ALLEGRA ALLERGY PO), Take  by mouth as needed., Taking    B Complex, Take  by mouth every day., Taking    Myrbetriq, Take 1 tablet every day by oral route as directed for 30 days.    Trospium Chloride,  (Patient not taking: Reported on 3/7/2024), Unknown      Social History     Tobacco Use    Smoking status: Former     Current packs/day: 0.00     Types: Cigarettes     Quit date:      Years since quittin.2    Smokeless tobacco: Never   Vaping Use    Vaping Use: Never used   Substance Use Topics    Alcohol  "use: Yes     Comment: Occ    Drug use: Yes     Types: Marijuana     Review of Systems   Constitutional:  Negative for chills, fever and malaise/fatigue.   Respiratory:  Negative for cough, hemoptysis, sputum production, shortness of breath and wheezing.    Cardiovascular:  Negative for chest pain, palpitations, orthopnea, claudication, leg swelling and PND.   Gastrointestinal:  Negative for abdominal pain.       Objective   Vitals:    03/07/24 1026 03/07/24 1029   BP: (!) 154/80 (!) 150/81   BP Location: Left arm Left arm   Patient Position: Sitting Sitting   BP Cuff Size: Large adult Large adult   Pulse: 68 71   Weight: 121 kg (267 lb)    Height: 1.753 m (5' 9\")       BP Readings from Last 10 Encounters:   03/07/24 (!) 150/81   02/13/24 134/66   01/10/24 (!) 140/72   01/08/24 (!) 145/83   12/13/23 (!) 156/64   11/29/23 (!) 140/82   10/25/23 130/70   08/25/23 (!) 142/82   07/25/23 (!) 144/72   07/04/23 137/67      Body mass index is 39.43 kg/m².  Wt Readings from Last 3 Encounters:   03/07/24 121 kg (267 lb)   02/13/24 123 kg (272 lb 3.2 oz)   01/10/24 121 kg (267 lb 3.2 oz)      Physical Exam  Vitals reviewed.   Constitutional:       General: He is not in acute distress.     Appearance: He is well-developed. He is not diaphoretic.   HENT:      Head: Normocephalic and atraumatic.   Neck:      Thyroid: No thyromegaly.      Vascular: No JVD.   Cardiovascular:      Rate and Rhythm: Normal rate and regular rhythm.      Pulses: Normal pulses.      Heart sounds: No murmur heard.  Pulmonary:      Effort: No respiratory distress.      Breath sounds: Normal breath sounds. No wheezing or rales.   Abdominal:      General: There is no distension.      Palpations: There is no mass.      Tenderness: There is no abdominal tenderness.   Musculoskeletal:         General: No swelling or tenderness.      Right lower leg: No edema.      Left lower leg: No edema.   Neurological:      General: No focal deficit present.      Mental " "Status: He is oriented to person, place, and time.      Cranial Nerves: No cranial nerve deficit.      Coordination: Coordination normal.   Psychiatric:         Mood and Affect: Mood normal.         Behavior: Behavior normal.       Lab Results   Component Value Date    CHOLSTRLTOT 121 02/12/2024    LDL 55 02/12/2024    HDL 57 02/12/2024    TRIGLYCERIDE 47 02/12/2024      Lab Results   Component Value Date/Time    LIPOPROTA <6 02/12/2024 08:13 AM      No results found for: \"APOB\"    Lab Results   Component Value Date    PROTHROMBTM 12.9 06/15/2023    INR 0.98 06/15/2023       Lab Results   Component Value Date    HBA1C 6.8 (H) 12/05/2023      Lab Results   Component Value Date    SODIUM 140 02/12/2024    POTASSIUM 4.5 02/12/2024    CHLORIDE 105 02/12/2024    CO2 23 02/12/2024    GLUCOSE 127 (H) 02/12/2024    BUN 20 02/12/2024    CREATININE 0.98 02/12/2024        Lab Results   Component Value Date    WBC 7.1 02/12/2024    RBC 5.33 02/12/2024    HEMOGLOBIN 15.2 02/12/2024    HEMATOCRIT 47.0 02/12/2024    MCV 88.2 02/12/2024    MCH 28.5 02/12/2024    MCHC 32.3 02/12/2024    MPV 10.0 02/12/2024     Lab Results   Component Value Date    HBA1C 6.8 (H) 12/05/2023      Lab Results   Component Value Date/Time    MALBCRT 9 02/12/2024 08:12 AM    MICROALBUR 1.7 02/12/2024 08:12 AM      VASCULAR IMAGING:    AAA screening duplex 8/2023   Mild prominence of the proximal abdominal aorta, measuring up to 3.4 cm.     CTA abd 3/1/24  Vascular findings: Abdominal aorta is nonaneurysmal, measuring 2.8 cm maximally. No dissection. Renal, mesenteric, and iliac arteries are patent. Mild scattered plaque throughout these vessels.   Adrenal glands: Normal   1. Abdominal aorta is nonaneurysmal, measuring 2.8 cm in diameter. This represents a more accurate measurement when compared with the previous ultrasound.  2. Tiny liver cyst and hepatic steatosis are noted incidentally.         Medical Decision Making:  Today's Assessment / Status / " Plan:     1. Abdominal aortic ectasia (HCC)      not aneurysmal      2. Primary hypertension  telmisartan (MICARDIS) 40 MG Tab      3. Other hyperlipidemia        4. RICHARD on CPAP        5. Obesity (BMI 30-39.9)        6. Prediabetes        7. Elevated blood pressure reading without diagnosis of hypertension  Referral to 24-Hour Blood Pressure Monitoring        Patient Type: Secondary Prevention    Etiology of Established CVD if Present:     1) mild abd aortic dilation, non-aneurysmal - asymptomatic   8/2023 duplex - initial sizing = 3.4cm, considered small   3/2024 CTA = 2.8cm indicating minimal dilation only, not aneurysmal  - as reviewed duplex scanning can be less accurate and CTA is true measure modality   - AAA when 3.0+cm   - at initial visit, reviewed pathophys and gave informational handouts   Plan:  - continue secondary prevention med mgmt including antiplatelet therapy  - consider repeat Ao/iliac duplex vs CTA in 3yrs (2027)    BLOOD PRESSURE CONTROL   Office BP Goal ACC/AHA (2017) goal <130/80  Home BP at goal:  no  Office BP at goal:  no  24h ABPM:  pending   RDN candidate? NO  Contributing factors: n/a   LVH on prior EKG   Plan:   Monitoring:   - start/continue home BP monitoring, reviewed correct technique, provide BP log and instructions  - order 24h ABPM:  yes  - monitor lytes/gfr routinely   Medications:  - increase telmisartan to 40mg daily    LIPID MANAGEMENT:   Qualifies for Statin Therapy Based on 2018 ACC/AHA Guidelines: yes, Secondary Prevention - Very high risk ASCVD - 2 major events or 1 event with other high-risk conditions  The ASCVD Risk score (Boyers DK, et al., 2019) failed to calculate.  Major ASCVD events: Aortic aneurysm due to atherosclerosis   High-risk condition: >64yr old  and Hypertension   Currently on Statin: Started at this visit  Tx goals: LDL-C <55   At goal? no  Plan:   - reinforced ongoing TLC measures as noted   - monitor labs   Meds:   - start rosuvastatin 20mg daily   -  add zetia next     ANTITHROMBOTIC/ANTIPLATELET THERAPY: start ASA 81mg daily     GLYCEMIC STATUS: Normal    LIFESTYLE INTERVENTIONS:    SMOKING: Stages of Change: Maintenance (sustained change >6mo)     reports that he quit smoking about 34 years ago. His smoking use included cigarettes. He has never used smokeless tobacco.   - continued complete avoidance of all tobacco products     PHYSICAL ACTIVITY: continue healthy activity to improve CV fitness.  In general, targeting >150min/week of moderate-level activity or as much as tolerated in light of functional status and co-morbidities   - avoid excessive lifting, limit to body weight, moderate intensity    WEIGHT MANAGEMENT AND NUTRITION: DASH style dietary approach , Focus on reduced sodium to <2,000mg per day , and Provide specific dietary approach handouts      OTHER: none     Instructed to follow-up with PCP for remainder of adult medical needs: yes  We will partner with other providers in the management of established vascular disease and cardiometabolic risk factors.    Studies to Be Obtained: 24h ABPM, Ao/iliac duplex 2027   Labs to Be Obtained: as noted in assessment    Follow up in:    6 weeks     Osvaldo Palacios M.D.  [facility]   [facility]   [phone]

## 2024-03-12 ENCOUNTER — OFFICE VISIT (OUTPATIENT)
Dept: MEDICAL GROUP | Facility: PHYSICIAN GROUP | Age: 69
End: 2024-03-12
Payer: MEDICARE

## 2024-03-12 VITALS
RESPIRATION RATE: 18 BRPM | BODY MASS INDEX: 39.72 KG/M2 | DIASTOLIC BLOOD PRESSURE: 60 MMHG | WEIGHT: 268.2 LBS | HEIGHT: 69 IN | TEMPERATURE: 97.5 F | SYSTOLIC BLOOD PRESSURE: 118 MMHG | OXYGEN SATURATION: 95 % | HEART RATE: 79 BPM

## 2024-03-12 DIAGNOSIS — E55.9 VITAMIN D DEFICIENCY: ICD-10-CM

## 2024-03-12 DIAGNOSIS — R73.9 HYPERGLYCEMIA: ICD-10-CM

## 2024-03-12 DIAGNOSIS — R63.5 WEIGHT GAIN: ICD-10-CM

## 2024-03-12 DIAGNOSIS — I10 PRIMARY HYPERTENSION: ICD-10-CM

## 2024-03-12 DIAGNOSIS — E11.9 TYPE 2 DIABETES MELLITUS WITHOUT COMPLICATION, WITHOUT LONG-TERM CURRENT USE OF INSULIN (HCC): ICD-10-CM

## 2024-03-12 LAB
HBA1C MFR BLD: 6.4 % (ref ?–5.8)
POCT INT CON NEG: NEGATIVE
POCT INT CON POS: POSITIVE

## 2024-03-12 PROCEDURE — 3074F SYST BP LT 130 MM HG: CPT | Performed by: FAMILY MEDICINE

## 2024-03-12 PROCEDURE — 83036 HEMOGLOBIN GLYCOSYLATED A1C: CPT | Performed by: FAMILY MEDICINE

## 2024-03-12 PROCEDURE — 3078F DIAST BP <80 MM HG: CPT | Performed by: FAMILY MEDICINE

## 2024-03-12 PROCEDURE — 99214 OFFICE O/P EST MOD 30 MIN: CPT | Performed by: FAMILY MEDICINE

## 2024-03-12 RX ORDER — METFORMIN HYDROCHLORIDE 500 MG/1
500 TABLET, EXTENDED RELEASE ORAL DAILY
Qty: 90 TABLET | Refills: 1 | Status: SHIPPED | OUTPATIENT
Start: 2024-03-12 | End: 2024-09-08

## 2024-03-12 RX ORDER — ERGOCALCIFEROL 1.25 MG/1
50000 CAPSULE ORAL
Qty: 13 CAPSULE | Refills: 1 | Status: SHIPPED | OUTPATIENT
Start: 2024-03-12 | End: 2024-09-08

## 2024-03-12 ASSESSMENT — FIBROSIS 4 INDEX: FIB4 SCORE: 1.62

## 2024-03-12 NOTE — PROGRESS NOTES
Subjective:     CC:   Chief Complaint   Patient presents with    Follow-Up       HPI:   Nando presents today for follow-up of his labs.  Patient is taking his metformin but feels that he has a hard time with his weight he is talked to some of his friends who are using injectables at this time.    Past Medical History:   Diagnosis Date    Daytime sleepiness     Gasping for breath     sometimes    History of elevated glucose     RICHARD on CPAP 2022    Seasonal allergies 2022    [place]    Snoring     DX RICHARD    Urinary bladder disorder 2010    Bladder Cancer       Social History     Tobacco Use    Smoking status: Former     Current packs/day: 0.00     Types: Cigarettes     Quit date:      Years since quittin.2    Smokeless tobacco: Never   Vaping Use    Vaping Use: Never used   Substance Use Topics    Alcohol use: Yes     Comment: Occ    Drug use: Yes     Types: Marijuana       Current Outpatient Medications Ordered in Epic   Medication Sig Dispense Refill    vitamin D2, Ergocalciferol, (DRISDOL) 1.25 MG (67721 UT) Cap capsule Take 1 Capsule by mouth every 7 days for 180 days. 13 Capsule 1    metFORMIN ER (GLUCOPHAGE XR) 500 MG TABLET SR 24 HR Take 1 Tablet by mouth every day for 180 days. 90 Tablet 1    telmisartan (MICARDIS) 40 MG Tab Take 1 Tablet by mouth every day. to lower blood pressure 90 Tablet 3    vibegron (GEMTESA) 75 MG tablet Take 1 tablet every day by oral route as directed for 42 days.      aspirin 81 MG EC tablet Take 1 Tablet by mouth every day. 30 Tablet     rosuvastatin (CRESTOR) 20 MG Tab Take 1 Tablet by mouth every evening. 90 Tablet 3    VITAMIN D PO Take  by mouth.      Multiple Vitamin (MULTIVITAMIN ADULT PO) Take  by mouth.      Azelastine HCl 0.15 % Solution Administer 2 Sprays into each nostril.      Fexofenadine HCl (ALLEGRA ALLERGY PO) Take  by mouth as needed.      B Complex Cap Take  by mouth every day.       No current [facility]-ordered facility-administered medications on  "file.       Allergies:  Sagebrush allergy skin test    Health Maintenance: Completed    ROS:  Gen: no fevers/chills, no changes in weight  Eyes: no changes in vision  ENT: no sore throat, no hearing loss, no bloody nose  Pulm: no sob, no cough  CV: no chest pain, no palpitations  GI: no nausea/vomiting, no diarrhea  : no dysuria  Neuro: no headaches, no numbness/tingling  Heme/Lymph: no easy bruising    Objective:     Exam:  /60 (BP Location: Left arm, Patient Position: Sitting, BP Cuff Size: Large adult)   Pulse 79   Temp 36.4 °C (97.5 °F) (Temporal)   Resp 18   Ht 1.753 m (5' 9\")   Wt 122 kg (268 lb 3.2 oz)   SpO2 95%   BMI 39.61 kg/m²  Body mass index is 39.61 kg/m².    Gen: Alert and oriented, No apparent distress.  Skin: Warm and dry.  No obvious lesions.  Eyes: Sclera wnl Pupils normal in size  Lungs: Normal effort, CTA bilaterally, no wheezes, rhonchi, or rales  CV: Regular rate and rhythm. No murmurs, rubs, or gallops.  Musculoskeletal: Normal gait. No extremity cyanosis, clubbing, or edema.  Neuro: Oriented to person, place and time  Psych: Mood is wnl         Assessment & Plan:     69 y.o. male with the following -     1. Type 2 diabetes mellitus without complication, without long-term current use of insulin (HCC)  Patient's hemoglobin A1c is 6.4 patient at this time would like to see someone concerning possibility of being on the injectable.  I explained to patient there is a really hard issue on getting the injectables available at this time.  Patient would like to start online because he states throughout his entire life he has always had issues with weight issues I did inform patient that the injectables can help individuals lose weight but is not guaranteed it can continue the individual on a weight loss pathway.  Will go ahead and refer him to pharmacotherapy I would recommend he continue the metformin at this point.  Patient does have other medical issues he may qualify for the " injectables but more than likely pharmacotherapy we will know if his insurance will cover for this.  - Referral to Pharmacotherapy Service    2. Hyperglycemia  - POCT  A1C    3. Weight gain  Patient to continue working on exercise and watching when he is eating    4. Primary hypertension  Patient's blood pressure is at goal    5. Vitamin D deficiency  Patient's vitamin D is very low we will start him on 50,000 IUs once a week we will recheck this in 4 months    Other orders  - vitamin D2, Ergocalciferol, (DRISDOL) 1.25 MG (20884 UT) Cap capsule; Take 1 Capsule by mouth every 7 days for 180 days.  Dispense: 13 Capsule; Refill: 1  - metFORMIN ER (GLUCOPHAGE XR) 500 MG TABLET SR 24 HR; Take 1 Tablet by mouth every day for 180 days.  Dispense: 90 Tablet; Refill: 1       Return in about 4 months (around 7/12/2024), or if symptoms worsen or fail to improve.    Please note that this dictation was created using voice recognition software. I have made every reasonable attempt to correct obvious errors, but I expect that there are errors of grammar and possibly content that I did not discover before finalizing the note.

## 2024-03-29 ENCOUNTER — NON-PROVIDER VISIT (OUTPATIENT)
Dept: VASCULAR LAB | Facility: MEDICAL CENTER | Age: 69
End: 2024-03-29
Attending: INTERNAL MEDICINE
Payer: MEDICARE

## 2024-03-29 ENCOUNTER — TELEPHONE (OUTPATIENT)
Dept: PHARMACY | Facility: MEDICAL CENTER | Age: 69
End: 2024-03-29

## 2024-03-29 VITALS — SYSTOLIC BLOOD PRESSURE: 146 MMHG | DIASTOLIC BLOOD PRESSURE: 72 MMHG | HEART RATE: 76 BPM

## 2024-03-29 DIAGNOSIS — E11.9 TYPE 2 DIABETES MELLITUS WITHOUT COMPLICATION, WITHOUT LONG-TERM CURRENT USE OF INSULIN (HCC): ICD-10-CM

## 2024-03-29 PROCEDURE — 99213 OFFICE O/P EST LOW 20 MIN: CPT

## 2024-03-29 RX ORDER — SEMAGLUTIDE 1.34 MG/ML
.25-.5 INJECTION, SOLUTION SUBCUTANEOUS
Qty: 1.5 ML | Refills: 1 | Status: SHIPPED | OUTPATIENT
Start: 2024-03-29

## 2024-03-29 NOTE — TELEPHONE ENCOUNTER
Semaglutide,0.25 or 0.5MG/DOS, (OZEMPIC, 0.25 OR 0.5 MG/DOSE,) 2 MG/1.5ML Solution Pen-injector     Received [employer] [facility] request via [employer]  for Ozempic. (Quantity:1.5ml, Day Supply:28)     Insurance: Medicare  Member ID:  [id number]  BIN: [id number]  PCN: [id number]  Group: [id number]     Ran Test claim via [place] [employer] medication Pays for a $556 copay. Will outreach to patient to offer specialty pharmacy services and or release to preferred pharmacy    Not Sending to outreach due to stock limitations at [facility] from [employer] Shortage of Ozempic. noted in epic.

## 2024-03-29 NOTE — PROGRESS NOTES
Patient Consult Note    TIME IN: 10:25 am  TIME OUT: 11:11 am    Primary care physician: Lisa Otero M.D.    Reason for consult: Management of Controlled Type 2 Diabetes    HPI:  Nando Nelson is a 69 y.o. old patient who comes in today for evaluation of above stated problem.    Allergies  Sagebrush allergy skin test    Current Diabetes Medication Regimen  Metformin ER: 500 mg daily    Previous Diabetes Medications and Reason for Discontinuation  none    Potential Barriers to Care:  Adherence: denies missed doses  Side effects: none  Affordability: no issues    SMBG  Pt has home glucometer and proper testing technique - yes  Discussed BG Goals: FBG 80 - 130, 2hPP < 180, A1c < 7.0%    Pt reports blood sugars:   Before Breakfast: 120-150, usually 130  Before Lunch:     Hypoglycemia  Hypoglycemia awareness: No , educated patient  Nocturnal hypoglycemia: None  Hypoglycemia:  None  Pt's treatment of Hypoglycemia  Discussed 15:15 Rule    Lifestyle  Current Exercise - 2.5 miles walk 4-5 days per week  Exercise goal - 50 minutes 5 days per week    Dietary - low sugar, low carb  Breakfast - eggs, cottage cheese w/ fruit, oats w/ nuts  Lunch - Salad, half sandwich (4-5 pieces of bread per week), eats out for lunch once per month  Dinner - Chicken, salad, broccoli soup  Snacks - peanuts at night, popcorn  Drinks - water, diet soda occasionally, soda water    Labs  Lab Results   Component Value Date/Time    HBA1C 6.4 (A) 03/12/2024 09:05 AM      Lab Results   Component Value Date/Time    SODIUM 140 02/12/2024 08:13 AM    POTASSIUM 4.5 02/12/2024 08:13 AM    CHLORIDE 105 02/12/2024 08:13 AM    CO2 23 02/12/2024 08:13 AM    GLUCOSE 127 (H) 02/12/2024 08:13 AM    BUN 20 02/12/2024 08:13 AM    CREATININE 0.98 02/12/2024 08:13 AM     Lab Results   Component Value Date/Time    ALKPHOSPHAT 79 02/12/2024 08:13 AM    ASTSGOT 25 02/12/2024 08:13 AM    ALTSGPT 29 02/12/2024 08:13 AM    TBILIRUBIN 0.5 02/12/2024 08:13 AM    INR  0.98 06/15/2023 10:07 AM    ALBUMIN 4.0 02/12/2024 08:13 AM      Lab Results   Component Value Date/Time    CHOLSTRLTOT 121 02/12/2024 08:13 AM    LDL 55 02/12/2024 08:13 AM    HDL 57 02/12/2024 08:13 AM    TRIGLYCERIDE 47 02/12/2024 08:13 AM       Lab Results   Component Value Date/Time    MALBCRT 9 02/12/2024 08:12 AM    MICROALBUR 1.7 02/12/2024 08:12 AM       Physical Examination:  Vital signs: BP (!) 146/72   Pulse 76  There is no height or weight on file to calculate BMI.  States home /70's at home    Foot Exam:  Monofilament exam - 3/29/2024  Monofilament testing with a 10 gram force: sensation intact: decreased bilaterally.    Visual Inspection: Feet without maceration, ulcers, fissures.  Feet dry.  Pedal pulses: intact bilaterally    Assessment and Plan:    1. DM2  Basic physiology of DMII was explained to patient as well as microvascular/macrovascular complications. The importance of increasing physical activity to improve diabetes control was discussed with the patient. Patient was also educated on changing diet and making better choices to help control blood sugar.   Discussed Goals: FBG 80 - 130, 2hPP < 180, a1c < 6.5%  Last a1c drawn on 3/12/24 was 6.4%, which is at goal  Patient diabetic controlled on Metformin and lifestyle, interested in trying GLP1RA to further promote glycemic control and overall health - States it should be covered by his insurance but is willing to pay cash (340B pricing if it is not covered)    - Medication changes:  Start Ozempic 0.25 mg weekly   Discontinue Metformin 500 mg daily  - Continue:  N/a     - Lifestyle changes:  Exercise Goal - increase to 1 hour 5 days per week  Dietary Goal - DASH diet    - Preventative management:  Care gaps addressed: foot exam performed  Care gaps updated in Health Maintenance    Follow Up:  3 weeks    Tammie Montoya PharmD    CC:   Lisa Otero M.D.                                                   [facility] Pharmacotherapy  Program Consent      Name    Nando Nelson    MRN number: [id number]    the following are guidelines for participation in the [facility] Pharmacotherapy Program.     I, ____Nando Nelson_____, understand and voluntarily agree to participate in the [facility] Pharmacotherapy Program and to have services provided to me by pharmacists working in collaboration with my provider.    I understand the pharmacist within the [facility] Pharmacotherapy Program may initiate, modify or discontinue my medications, order appropriate testing and appointments, perform exams, monitor treatment, and make clinical evaluations and decisions pursuant to a collaborative practice agreement with my provider.  I understand the pharmacist within the [facility] Pharmacotherapy Program is not a physician, osteopathic physician, advanced practice registered nurse or physician assistant and may not diagnose.  I will take all my medications as instructed and not change the way I take it without first talking to my provider or a pharmacist within the [facility] Pharmacotherapy Program.  I understand that if I am late to my appointment I may not be able to be seen by a pharmacist at that time and will have to reschedule my appointment.  During appointment with pharmacist I understand that pharmacist has the right not to answer questions or perform services outside the pharmacist’s scope of practice.  By signing below, I provide informed consent for the pharmacist to provide these services and for my participation in the [facility] Pharmacotherapy Program.      Nando Nelson           [id number]          03/29/24  Patient Name                   MRN number  Date     ____Obtained verbal consent from Nando Nelson____  Patient Signature

## 2024-04-02 ENCOUNTER — TELEPHONE (OUTPATIENT)
Dept: CARDIOLOGY | Facility: MEDICAL CENTER | Age: 69
End: 2024-04-02
Payer: MEDICARE

## 2024-04-02 DIAGNOSIS — E11.9 TYPE 2 DIABETES MELLITUS WITHOUT COMPLICATION, WITHOUT LONG-TERM CURRENT USE OF INSULIN (HCC): ICD-10-CM

## 2024-04-02 RX ORDER — SEMAGLUTIDE 1.34 MG/ML
0.25 INJECTION, SOLUTION SUBCUTANEOUS
Qty: 1.5 ML | Refills: 1 | Status: SHIPPED
Start: 2024-04-02 | End: 2024-04-26

## 2024-04-02 NOTE — TELEPHONE ENCOUNTER
Received the following message:        Resent RX for Ozempic 0.25mg q 7 days to [facility]  Asked RX Coordinators to release RX to pharmacy    Billie Bahena, PharmD

## 2024-04-02 NOTE — TELEPHONE ENCOUNTER
[facility] MED: MAYS, JASON    Caller: Nando Nelson    Topic/issue: MEDICATION MANAGEMENT    Nando states that he called the pharmacy and they never received the medication OZEMPIC. He would like to know if we can send a new script over to the following pharmacy:    [id number] - [place], [place] - [address] [[id number]]     Please adviseDemar    Callback Number: [phone]

## 2024-04-15 ENCOUNTER — NON-PROVIDER VISIT (OUTPATIENT)
Dept: VASCULAR LAB | Facility: MEDICAL CENTER | Age: 69
End: 2024-04-15
Attending: FAMILY MEDICINE
Payer: MEDICARE

## 2024-04-15 PROCEDURE — 93788 AMBL BP MNTR W/SW A/R: CPT

## 2024-04-15 PROCEDURE — 93786 AMBL BP MNTR W/SW REC ONLY: CPT

## 2024-04-15 NOTE — NON-PROVIDER
ABPM placed on 04/15/2024 at 10:12AM.    Patient educated on monitor care and procedure.    Patient to return device on 04/16/2024 before 1700.    Verbal understanding provided.     Katherine Moran Med [facility]'t  [facility]  Ph. [phone]  Fx. [phone]

## 2024-04-16 PROCEDURE — 93790 AMBL BP MNTR W/SW I&R: CPT | Performed by: INTERNAL MEDICINE

## 2024-04-17 NOTE — PROGRESS NOTES
Ambulatory blood pressure monitor results reviewed  Full report under media tab  Reasonable data acquisition    Mean daytime: 125 or 69 consistent with well-controlled out of office blood pressure    Nocturnal dip: Well-maintained    Clinical correlation needed.  Will discuss with patient at follow-up visit    Michael Bloch, MD  [facility]

## 2024-04-25 ENCOUNTER — TELEPHONE (OUTPATIENT)
Dept: VASCULAR LAB | Facility: MEDICAL CENTER | Age: 69
End: 2024-04-25
Payer: MEDICARE

## 2024-04-25 NOTE — TELEPHONE ENCOUNTER
Established patient  Chart prep for upcoming appointment.    Any pending/incomplete orders from last visit? No, all orders completed.  Was patient called and reminded to complete pending orders? N/A orders complete  Were any records requested?  No    Referral up to date? Yes  Referral attached to appointment (renewals and New patients only)? N/A (established with up-to-date referral)  Virtual appointment? No

## 2024-04-26 ENCOUNTER — NON-PROVIDER VISIT (OUTPATIENT)
Dept: VASCULAR LAB | Facility: MEDICAL CENTER | Age: 69
End: 2024-04-26
Attending: INTERNAL MEDICINE
Payer: MEDICARE

## 2024-04-26 ENCOUNTER — OFFICE VISIT (OUTPATIENT)
Dept: VASCULAR LAB | Facility: MEDICAL CENTER | Age: 69
End: 2024-04-26
Payer: MEDICARE

## 2024-04-26 VITALS
DIASTOLIC BLOOD PRESSURE: 69 MMHG | BODY MASS INDEX: 39.1 KG/M2 | WEIGHT: 264 LBS | SYSTOLIC BLOOD PRESSURE: 118 MMHG | HEIGHT: 69 IN | HEART RATE: 80 BPM

## 2024-04-26 DIAGNOSIS — I77.811 ABDOMINAL AORTIC ECTASIA (HCC): ICD-10-CM

## 2024-04-26 DIAGNOSIS — R73.03 PREDIABETES: ICD-10-CM

## 2024-04-26 DIAGNOSIS — E78.49 OTHER HYPERLIPIDEMIA: ICD-10-CM

## 2024-04-26 DIAGNOSIS — G47.33 OSA ON CPAP: ICD-10-CM

## 2024-04-26 DIAGNOSIS — I10 PRIMARY HYPERTENSION: ICD-10-CM

## 2024-04-26 DIAGNOSIS — E11.9 TYPE 2 DIABETES MELLITUS WITHOUT COMPLICATION, WITHOUT LONG-TERM CURRENT USE OF INSULIN (HCC): ICD-10-CM

## 2024-04-26 PROCEDURE — 99213 OFFICE O/P EST LOW 20 MIN: CPT

## 2024-04-26 PROCEDURE — 99212 OFFICE O/P EST SF 10 MIN: CPT

## 2024-04-26 PROCEDURE — 3078F DIAST BP <80 MM HG: CPT

## 2024-04-26 PROCEDURE — 3074F SYST BP LT 130 MM HG: CPT

## 2024-04-26 PROCEDURE — 99214 OFFICE O/P EST MOD 30 MIN: CPT

## 2024-04-26 RX ORDER — SEMAGLUTIDE 0.68 MG/ML
0.5 INJECTION, SOLUTION SUBCUTANEOUS
Qty: 3 ML | Refills: 11 | Status: SHIPPED | OUTPATIENT
Start: 2024-04-26

## 2024-04-26 ASSESSMENT — ENCOUNTER SYMPTOMS
HEMOPTYSIS: 0
WHEEZING: 0
SPUTUM PRODUCTION: 0
CHILLS: 0
COUGH: 0
CLAUDICATION: 0
SHORTNESS OF BREATH: 0
ABDOMINAL PAIN: 0
PND: 0
ORTHOPNEA: 0
PALPITATIONS: 0
FEVER: 0

## 2024-04-26 ASSESSMENT — FIBROSIS 4 INDEX: FIB4 SCORE: 1.62

## 2024-04-26 NOTE — PROGRESS NOTES
FOLLOW-UP VASCULAR MEDICINE VISIT  2024     Nando Nelson is a 68 yo male  who presents for f/u   initially referred by Lisa Otero M.D. for eval and med mgmt of AAA, est 2024     Subjective       Interval hx/concerns: seen 3/2024,   Had ABPM  Doing well, no concerns.     Abdominal aortic dilation (prior thought to be aneurysm):  Current CTA imaging shows 2.8cm, down from prior duplex showing 3.4cm.  no new sx.   Pertinent pmhx:  Initial sizing: 3.4cm on duplex 2023   Prior interventions: none   Prior ASCVD events: none   Fam hx of aneurysmal disease: no   reports that he quit smoking about 34 years ago. His smoking use included cigarettes. He has never used smokeless tobacco.     HTN: Stable, tolerating increase in telmisartan,   good adherence,   Home BP los-120s/60s, mostly 110s/60s  Denies dizziness, lightheadedness, no HA or vision changes  No CP, SOB, palpitations, or leg swelling  Watching salt in diet    Hyperlipidemia:   Stable,   Current treatment: rosuvastatin 20mg - tolerating     Antiplatelet/anticoagulation:   No bleeding on ASA        Current Outpatient Medications:     Ozempic (0.25 or 0.5 MG/DOSE), 0.25 mg, Subcutaneous, Q7 DAYS, Taking    vitamin D2 (Ergocalciferol), 50,000 Units, Oral, Q7 DAYS, Taking    telmisartan, 40 mg, Oral, DAILY, Taking    aspirin, 81 mg, Oral, DAILY, Taking    rosuvastatin, 20 mg, Oral, Q EVENING, Taking    Multiple Vitamin (MULTIVITAMIN ADULT PO), Take  by mouth., Taking    Azelastine HCl, Administer 2 Sprays into each nostril., Taking    Fexofenadine HCl (ALLEGRA ALLERGY PO), Take  by mouth as needed., Taking    B Complex, Take  by mouth every day., Taking    Gemtesa, Take 1 tablet every day by oral route as directed for 42 days.      Social History     Tobacco Use    Smoking status: Former     Current packs/day: 0.00     Types: Cigarettes     Quit date:      Years since quittin.3    Smokeless tobacco: Never   Vaping Use    Vaping Use:  "Never used   Substance Use Topics    Alcohol use: Yes     Comment: Occ    Drug use: Yes     Types: Marijuana     Review of Systems   Constitutional:  Negative for chills, fever and malaise/fatigue.   Respiratory:  Negative for cough, hemoptysis, sputum production, shortness of breath and wheezing.    Cardiovascular:  Negative for chest pain, palpitations, orthopnea, claudication, leg swelling and PND.   Gastrointestinal:  Negative for abdominal pain.       Objective   Vitals:    04/26/24 1303 04/26/24 1307   BP: (!) 144/74 118/69   BP Location: Left arm Left arm   Patient Position: Sitting Sitting   BP Cuff Size: Large adult Large adult   Pulse: 78 80   Weight: 120 kg (264 lb)    Height: 1.753 m (5' 9\")       BP Readings from Last 10 Encounters:   04/26/24 118/69   03/29/24 (!) 146/72   03/12/24 118/60   03/07/24 (!) 150/81   02/13/24 134/66   01/10/24 (!) 140/72   01/08/24 (!) 145/83   12/13/23 (!) 156/64   11/29/23 (!) 140/82   10/25/23 130/70      Body mass index is 38.99 kg/m².  Wt Readings from Last 3 Encounters:   04/26/24 120 kg (264 lb)   03/12/24 122 kg (268 lb 3.2 oz)   03/07/24 121 kg (267 lb)      Physical Exam  Vitals reviewed.   Constitutional:       General: He is not in acute distress.     Appearance: He is well-developed. He is not diaphoretic.   HENT:      Head: Normocephalic and atraumatic.   Neck:      Thyroid: No thyromegaly.      Vascular: No JVD.   Cardiovascular:      Rate and Rhythm: Normal rate and regular rhythm.      Pulses: Normal pulses.      Heart sounds: No murmur heard.  Pulmonary:      Effort: No respiratory distress.      Breath sounds: Normal breath sounds. No wheezing or rales.   Abdominal:      General: There is no distension.      Palpations: There is no mass.      Tenderness: There is no abdominal tenderness.   Musculoskeletal:         General: No swelling or tenderness.      Right lower leg: No edema.      Left lower leg: No edema.   Neurological:      General: No focal " "deficit present.      Mental Status: He is oriented to person, place, and time.      Cranial Nerves: No cranial nerve deficit.      Coordination: Coordination normal.   Psychiatric:         Mood and Affect: Mood normal.         Behavior: Behavior normal.       Lab Results   Component Value Date    CHOLSTRLTOT 121 02/12/2024    LDL 55 02/12/2024    HDL 57 02/12/2024    TRIGLYCERIDE 47 02/12/2024      Lab Results   Component Value Date/Time    LIPOPROTA <6 02/12/2024 08:13 AM      No results found for: \"APOB\"    Lab Results   Component Value Date    PROTHROMBTM 12.9 06/15/2023    INR 0.98 06/15/2023       Lab Results   Component Value Date    HBA1C 6.4 (A) 03/12/2024      Lab Results   Component Value Date    SODIUM 140 02/12/2024    POTASSIUM 4.5 02/12/2024    CHLORIDE 105 02/12/2024    CO2 23 02/12/2024    GLUCOSE 127 (H) 02/12/2024    BUN 20 02/12/2024    CREATININE 0.98 02/12/2024        Lab Results   Component Value Date    WBC 7.1 02/12/2024    RBC 5.33 02/12/2024    HEMOGLOBIN 15.2 02/12/2024    HEMATOCRIT 47.0 02/12/2024    MCV 88.2 02/12/2024    MCH 28.5 02/12/2024    MCHC 32.3 02/12/2024    MPV 10.0 02/12/2024     Lab Results   Component Value Date    HBA1C 6.4 (A) 03/12/2024      Lab Results   Component Value Date/Time    MALBCRT 9 02/12/2024 08:12 AM    MICROALBUR 1.7 02/12/2024 08:12 AM      VASCULAR IMAGING:    AAA screening duplex 8/2023   Mild prominence of the proximal abdominal aorta, measuring up to 3.4 cm.     CTA abd 3/1/24  Vascular findings: Abdominal aorta is nonaneurysmal, measuring 2.8 cm maximally. No dissection. Renal, mesenteric, and iliac arteries are patent. Mild scattered plaque throughout these vessels.   Adrenal glands: Normal   1. Abdominal aorta is nonaneurysmal, measuring 2.8 cm in diameter. This represents a more accurate measurement when compared with the previous ultrasound.  2. Tiny liver cyst and hepatic steatosis are noted incidentally.    ABPM 4/2024  Mean daytime: 125 or 69 " consistent with well-controlled out of office blood pressure     Nocturnal dip: Well-maintained       Medical Decision Making:  Today's Assessment / Status / Plan:     1. Primary hypertension        2. Prediabetes        3. RICHARD on CPAP        4. Abdominal aortic ectasia (HCC)        5. Other hyperlipidemia            Patient Type: Secondary Prevention    Etiology of Established CVD if Present:     1) mild abd aortic dilation, non-aneurysmal - asymptomatic   8/2023 duplex - initial sizing = 3.4cm, considered small   3/2024 CTA = 2.8cm indicating minimal dilation only, not aneurysmal  - as reviewed duplex scanning can be less accurate and CTA is true measure modality   - AAA when 3.0+cm   - at initial visit, reviewed pathophys and gave informational handouts   Plan:  - continue secondary prevention med mgmt including antiplatelet therapy  - consider repeat Ao/iliac duplex vs CTA in 3yrs (2027)    BLOOD PRESSURE CONTROL   Office BP Goal ACC/AHA (2017) goal <130/80  Home BP at goal:  yes  Office BP at goal:  yes  24h ABPM:  4/2024 125/69 daytime average  RDN candidate? NO  Contributing factors: n/a   LVH on prior EKG   Plan:   Monitoring:   - start/continue home BP monitoring, reviewed correct technique, provide BP log and instructions  - order 24h ABPM:  yes  - monitor lytes/gfr routinely - order at next appt  Medications:  - continue telmisartan to 40mg daily    LIPID MANAGEMENT:   Qualifies for Statin Therapy Based on 2018 ACC/AHA Guidelines: yes, Secondary Prevention - Very high risk ASCVD - 2 major events or 1 event with other high-risk conditions  The ASCVD Risk score (Jacques MERCER, et al., 2019) failed to calculate.  Major ASCVD events: Aortic aneurysm due to atherosclerosis   High-risk condition: >64yr old  and Hypertension   Currently on Statin: Started at this visit  Tx goals: LDL-C <55   At goal? Yes 2/2024  Plan:   - reinforced ongoing TLC measures as noted   - monitor labs   Meds:   - continue rosuvastatin  20mg daily   - add zetia next     ANTITHROMBOTIC/ANTIPLATELET THERAPY: continue ASA 81mg daily     GLYCEMIC STATUS: Normal    LIFESTYLE INTERVENTIONS:    SMOKING: Stages of Change: Maintenance (sustained change >6mo)     reports that he quit smoking about 34 years ago. His smoking use included cigarettes. He has never used smokeless tobacco.   - continued complete avoidance of all tobacco products     PHYSICAL ACTIVITY: continue healthy activity to improve CV fitness.  In general, targeting >150min/week of moderate-level activity or as much as tolerated in light of functional status and co-morbidities   - avoid excessive lifting, limit to body weight, moderate intensity    WEIGHT MANAGEMENT AND NUTRITION: DASH style dietary approach , Focus on reduced sodium to <2,000mg per day , and Provide specific dietary approach handouts      OTHER: none     Instructed to follow-up with PCP for remainder of adult medical needs: yes  We will partner with other providers in the management of established vascular disease and cardiometabolic risk factors.    Studies to Be Obtained: , Ao/iliac duplex 2027   Labs to Be Obtained: none, order at next appt if not done by PCP    Follow up in:    3 months     BEVERLEY Everett.R.N.  [facility]   [facility]   [phone]

## 2024-04-26 NOTE — PROGRESS NOTES
Patient Consult Note    TIME IN: 2:24 pm  TIME OUT: 2:44 pm    Primary care physician: Lisa Otero M.D.    Reason for consult: Management of Controlled Type 2 Diabetes    HPI:  Nando Nelson is a 69 y.o. old patient who comes in today for evaluation of above stated problem.    Allergies  Sagebrush allergy skin test    Current Diabetes Medication Regimen  Ozempic 0.25 mg weekly    Previous Diabetes Medications and Reason for Discontinuation  none    Potential Barriers to Care:  Adherence: denies missed doses  Side effects: none  Affordability: no issues    SMBG  Pt has home glucometer and proper testing technique - yes  Discussed BG Goals: FBG 80 - 130, 2hPP < 180, A1c < 7.0%    Pt reports blood sugars:   Before Breakfast: 110-120 average    Hypoglycemia  Hypoglycemia awareness: Yes, educated patient  Nocturnal hypoglycemia: None  Hypoglycemia:  None  Pt's treatment of Hypoglycemia  Discussed 15:15 Rule    Lifestyle  Current Exercise - 2.5 miles walk 4-5 days per week  Exercise goal - 50 minutes 5 days per week    Dietary - low sugar, low carb   Breakfast - eggs, cottage cheese w/ fruit, oats w/ nuts  Lunch - Salad, half sandwich (4-5 pieces of bread per week), eats out for lunch once per month  Dinner - Chicken, salad, broccoli soup  Snacks - peanuts at night, popcorn  Drinks - water, diet soda occasionally, soda water    Labs  Lab Results   Component Value Date/Time    HBA1C 6.4 (A) 03/12/2024 09:05 AM      Lab Results   Component Value Date/Time    SODIUM 140 02/12/2024 08:13 AM    POTASSIUM 4.5 02/12/2024 08:13 AM    CHLORIDE 105 02/12/2024 08:13 AM    CO2 23 02/12/2024 08:13 AM    GLUCOSE 127 (H) 02/12/2024 08:13 AM    BUN 20 02/12/2024 08:13 AM    CREATININE 0.98 02/12/2024 08:13 AM     Lab Results   Component Value Date/Time    ALKPHOSPHAT 79 02/12/2024 08:13 AM    ASTSGOT 25 02/12/2024 08:13 AM    ALTSGPT 29 02/12/2024 08:13 AM    TBILIRUBIN 0.5 02/12/2024 08:13 AM    INR 0.98 06/15/2023 10:07 AM     ALBUMIN 4.0 02/12/2024 08:13 AM      Lab Results   Component Value Date/Time    CHOLSTRLTOT 121 02/12/2024 08:13 AM    LDL 55 02/12/2024 08:13 AM    HDL 57 02/12/2024 08:13 AM    TRIGLYCERIDE 47 02/12/2024 08:13 AM       Lab Results   Component Value Date/Time    MALBCRT 9 02/12/2024 08:12 AM    MICROALBUR 1.7 02/12/2024 08:12 AM       Physical Examination:  Vital signs: There were no vitals taken for this visit. There is no height or weight on file to calculate BMI.  States home /70's at home    Foot Exam:  Monofilament exam - 3/29/2024    Assessment and Plan:    1. DM2  Discussed Goals: FBG 80 - 130, 2hPP < 180, a1c < 6.5%  Last a1c drawn on 3/12/24 was 6.4%, which is at goal  Patient was able to get Ozempic and has completed about 1 month of injections. Noticing some appetite suppression. BG also better controlled. Will continue to titrate for glycemic control/weight loss.   Weight loss of 4 lbs since last visit     - Medication changes:  Increase Ozempic 0.5 mg weekly   - Continue:  N/a     - Lifestyle changes:  Exercise Goal - increase to 1 hour 5 days per week  Dietary Goal - DASH diet    - Preventative management:  Care gaps addressed: foot exam performed  Care gaps updated in Health Maintenance    Follow Up:  2 months for A1c    Tammie Montoya, Natan    CC:   Lsia Otero M.D.

## 2024-05-09 ENCOUNTER — APPOINTMENT (RX ONLY)
Dept: URBAN - METROPOLITAN AREA CLINIC 38 | Facility: CLINIC | Age: 69
Setting detail: DERMATOLOGY
End: 2024-05-09

## 2024-05-09 DIAGNOSIS — L81.4 OTHER MELANIN HYPERPIGMENTATION: ICD-10-CM

## 2024-05-09 DIAGNOSIS — S31000A OPEN WOUND(S) (MULTIPLE) OF UNSPECIFIED SITE(S), WITHOUT MENTION OF COMPLICATION: ICD-10-CM

## 2024-05-09 DIAGNOSIS — L82.1 OTHER SEBORRHEIC KERATOSIS: ICD-10-CM

## 2024-05-09 DIAGNOSIS — D18.0 HEMANGIOMA: ICD-10-CM

## 2024-05-09 DIAGNOSIS — D22 MELANOCYTIC NEVI: ICD-10-CM

## 2024-05-09 DIAGNOSIS — Z71.89 OTHER SPECIFIED COUNSELING: ICD-10-CM

## 2024-05-09 DIAGNOSIS — Z87.2 PERSONAL HISTORY OF DISEASES OF THE SKIN AND SUBCUTANEOUS TISSUE: ICD-10-CM | Status: RESOLVED

## 2024-05-09 PROBLEM — D18.01 HEMANGIOMA OF SKIN AND SUBCUTANEOUS TISSUE: Status: ACTIVE | Noted: 2024-05-09

## 2024-05-09 PROBLEM — D22.9 MELANOCYTIC NEVI, UNSPECIFIED: Status: ACTIVE | Noted: 2024-05-09

## 2024-05-09 PROBLEM — S81.811A LACERATION WITHOUT FOREIGN BODY, RIGHT LOWER LEG, INITIAL ENCOUNTER: Status: ACTIVE | Noted: 2024-05-09

## 2024-05-09 PROCEDURE — ? COUNSELING

## 2024-05-09 PROCEDURE — 99213 OFFICE O/P EST LOW 20 MIN: CPT

## 2024-05-09 ASSESSMENT — LOCATION SIMPLE DESCRIPTION DERM
LOCATION SIMPLE: POSTERIOR NECK
LOCATION SIMPLE: INFERIOR FOREHEAD
LOCATION SIMPLE: LEFT FOREARM
LOCATION SIMPLE: LEFT ANTERIOR NECK
LOCATION SIMPLE: LEFT CHEEK
LOCATION SIMPLE: RIGHT FOREARM
LOCATION SIMPLE: RIGHT UPPER BACK
LOCATION SIMPLE: LEFT PRETIBIAL REGION
LOCATION SIMPLE: RIGHT PRETIBIAL REGION
LOCATION SIMPLE: ABDOMEN

## 2024-05-09 ASSESSMENT — LOCATION ZONE DERM
LOCATION ZONE: ARM
LOCATION ZONE: LEG
LOCATION ZONE: TRUNK
LOCATION ZONE: NECK
LOCATION ZONE: FACE

## 2024-05-09 ASSESSMENT — LOCATION DETAILED DESCRIPTION DERM
LOCATION DETAILED: RIGHT INFERIOR UPPER BACK
LOCATION DETAILED: INFERIOR MID FOREHEAD
LOCATION DETAILED: RIGHT DISTAL PRETIBIAL REGION
LOCATION DETAILED: LEFT LATERAL PROXIMAL PRETIBIAL REGION
LOCATION DETAILED: LEFT CLAVICULAR NECK
LOCATION DETAILED: RIGHT PROXIMAL DORSAL FOREARM
LOCATION DETAILED: PERIUMBILICAL SKIN
LOCATION DETAILED: LEFT INFERIOR CENTRAL MALAR CHEEK
LOCATION DETAILED: RIGHT MEDIAL TRAPEZIAL NECK
LOCATION DETAILED: LEFT DISTAL DORSAL FOREARM

## 2024-06-21 ENCOUNTER — NON-PROVIDER VISIT (OUTPATIENT)
Dept: VASCULAR LAB | Facility: MEDICAL CENTER | Age: 69
End: 2024-06-21
Attending: INTERNAL MEDICINE
Payer: MEDICARE

## 2024-06-21 ENCOUNTER — TELEPHONE (OUTPATIENT)
Dept: VASCULAR LAB | Facility: MEDICAL CENTER | Age: 69
End: 2024-06-21

## 2024-06-21 VITALS
WEIGHT: 255 LBS | HEART RATE: 77 BPM | DIASTOLIC BLOOD PRESSURE: 77 MMHG | BODY MASS INDEX: 37.66 KG/M2 | SYSTOLIC BLOOD PRESSURE: 130 MMHG

## 2024-06-21 DIAGNOSIS — E11.9 TYPE 2 DIABETES MELLITUS WITHOUT COMPLICATION, WITHOUT LONG-TERM CURRENT USE OF INSULIN (HCC): ICD-10-CM

## 2024-06-21 LAB
HBA1C MFR BLD: 5.8 % (ref ?–5.8)
POCT INT CON NEG: NEGATIVE
POCT INT CON POS: POSITIVE

## 2024-06-21 PROCEDURE — 99212 OFFICE O/P EST SF 10 MIN: CPT

## 2024-06-21 PROCEDURE — 83036 HEMOGLOBIN GLYCOSYLATED A1C: CPT

## 2024-06-21 RX ORDER — SEMAGLUTIDE 1.34 MG/ML
1 INJECTION, SOLUTION SUBCUTANEOUS
Qty: 3 ML | Refills: 3 | Status: SHIPPED | OUTPATIENT
Start: 2024-06-21

## 2024-06-21 ASSESSMENT — FIBROSIS 4 INDEX: FIB4 SCORE: 1.62

## 2024-06-21 NOTE — PROGRESS NOTES
Patient Consult Note    TIME IN: 9:55 am  TIME OUT: 10:12 am    Primary care physician: Lisa Otero M.D.    Reason for consult: Management of Controlled Type 2 Diabetes    HPI:  Nando Nelson is a 69 y.o. old patient who comes in today for evaluation of above stated problem.    Allergies  Sagebrush allergy skin test    Current Diabetes Medication Regimen  Ozempic 0.5 mg weekly    Previous Diabetes Medications and Reason for Discontinuation  none    Potential Barriers to Care:  Adherence: denies missed doses  Side effects: none  Affordability: no issues    SMBG  Pt has home glucometer and proper testing technique - yes  Discussed BG Goals: FBG 80 - 130, 2hPP < 180, A1c < 7.0%    Pt reports blood sugars:   Before Breakfast: 110-120 average    Hypoglycemia  Hypoglycemia awareness: Yes, educated patient  Nocturnal hypoglycemia: None  Hypoglycemia:  None  Pt's treatment of Hypoglycemia  Discussed 15:15 Rule    Lifestyle  Current Exercise - 2.5 miles walk 4-5 days per week  Exercise goal - 50 minutes 5 days per week    Dietary - low sugar, low carb   Breakfast - eggs, cottage cheese w/ fruit, oats w/ nuts  Lunch - Salad, half sandwich (4-5 pieces of bread per week), eats out for lunch once per month  Dinner - Chicken, salad, broccoli soup  Snacks - peanuts at night, popcorn  Drinks - water, diet soda occasionally, soda water    Labs  Lab Results   Component Value Date/Time    HBA1C 5.8 06/21/2024 12:00 AM      Lab Results   Component Value Date/Time    SODIUM 140 02/12/2024 08:13 AM    POTASSIUM 4.5 02/12/2024 08:13 AM    CHLORIDE 105 02/12/2024 08:13 AM    CO2 23 02/12/2024 08:13 AM    GLUCOSE 127 (H) 02/12/2024 08:13 AM    BUN 20 02/12/2024 08:13 AM    CREATININE 0.98 02/12/2024 08:13 AM     Lab Results   Component Value Date/Time    ALKPHOSPHAT 79 02/12/2024 08:13 AM    ASTSGOT 25 02/12/2024 08:13 AM    ALTSGPT 29 02/12/2024 08:13 AM    TBILIRUBIN 0.5 02/12/2024 08:13 AM    INR 0.98 06/15/2023 10:07 AM    ALBUMIN  4.0 02/12/2024 08:13 AM      Lab Results   Component Value Date/Time    CHOLSTRLTOT 121 02/12/2024 08:13 AM    LDL 55 02/12/2024 08:13 AM    HDL 57 02/12/2024 08:13 AM    TRIGLYCERIDE 47 02/12/2024 08:13 AM       Lab Results   Component Value Date/Time    MALBCRT 9 02/12/2024 08:12 AM    MICROALBUR 1.7 02/12/2024 08:12 AM       Physical Examination:  Vital signs: /77   Pulse 77   Wt 116 kg (255 lb)   BMI 37.66 kg/m²  Body mass index is 37.66 kg/m².  States home /70's at home    Foot Exam:  Monofilament exam - 3/29/2024    Assessment and Plan:    1. DM2  Discussed Goals: FBG 80 - 130, 2hPP < 180, a1c < 6.5%  Last a1c drawn today was 5.8%, which is at goal  A1c has improved with Ozempic titration and patient has associated weight loss - will increase Ozempic to promote further weight loss  He continues to exercise on a regular basis and is eating the healthiest that he can    - Medication changes:  Increase Ozempic 1 mg weekly   - Continue:  N/a     - Lifestyle changes:  Exercise Goal - increase to 1 hour 5 days per week  Dietary Goal - DASH diet    - Preventative management:  Care gaps addressed: foot exam performed  Care gaps updated in Health Maintenance    Follow Up:  6 weeks, per patient preference    Aaron BullockD    CC:   Lisa Otero M.D.

## 2024-07-16 ENCOUNTER — DOCUMENTATION (OUTPATIENT)
Dept: VASCULAR LAB | Facility: MEDICAL CENTER | Age: 69
End: 2024-07-16
Payer: MEDICARE

## 2024-07-24 ENCOUNTER — OFFICE VISIT (OUTPATIENT)
Dept: VASCULAR LAB | Facility: MEDICAL CENTER | Age: 69
End: 2024-07-24
Payer: MEDICARE

## 2024-07-24 VITALS
BODY MASS INDEX: 37.62 KG/M2 | SYSTOLIC BLOOD PRESSURE: 128 MMHG | HEIGHT: 69 IN | WEIGHT: 254 LBS | HEART RATE: 81 BPM | DIASTOLIC BLOOD PRESSURE: 77 MMHG

## 2024-07-24 DIAGNOSIS — E78.49 OTHER HYPERLIPIDEMIA: ICD-10-CM

## 2024-07-24 DIAGNOSIS — I10 PRIMARY HYPERTENSION: ICD-10-CM

## 2024-07-24 DIAGNOSIS — I71.41 PARARENAL ABDOMINAL AORTIC ANEURYSM (AAA) WITHOUT RUPTURE (HCC): ICD-10-CM

## 2024-07-24 DIAGNOSIS — Z79.02 ANTIPLATELET OR ANTITHROMBOTIC LONG-TERM USE: ICD-10-CM

## 2024-07-24 DIAGNOSIS — E66.9 OBESITY (BMI 30-39.9): ICD-10-CM

## 2024-07-24 DIAGNOSIS — E11.9 TYPE 2 DIABETES MELLITUS WITHOUT COMPLICATION, WITHOUT LONG-TERM CURRENT USE OF INSULIN (HCC): ICD-10-CM

## 2024-07-24 DIAGNOSIS — I77.811 ABDOMINAL AORTIC ECTASIA (HCC): ICD-10-CM

## 2024-07-24 PROCEDURE — 99212 OFFICE O/P EST SF 10 MIN: CPT

## 2024-07-24 PROCEDURE — 3074F SYST BP LT 130 MM HG: CPT

## 2024-07-24 PROCEDURE — 99214 OFFICE O/P EST MOD 30 MIN: CPT

## 2024-07-24 PROCEDURE — 3078F DIAST BP <80 MM HG: CPT

## 2024-07-24 ASSESSMENT — ENCOUNTER SYMPTOMS
ABDOMINAL PAIN: 0
HEMOPTYSIS: 0
DIZZINESS: 0
FEVER: 0
WEAKNESS: 0
COUGH: 0
CHILLS: 0
FOCAL WEAKNESS: 0
CLAUDICATION: 0
BLOOD IN STOOL: 0
SHORTNESS OF BREATH: 0
SPEECH CHANGE: 0
PALPITATIONS: 0
HEADACHES: 0
BRUISES/BLEEDS EASILY: 0
MYALGIAS: 0

## 2024-07-24 ASSESSMENT — FIBROSIS 4 INDEX: FIB4 SCORE: 1.62

## 2024-08-02 ENCOUNTER — TELEPHONE (OUTPATIENT)
Dept: VASCULAR LAB | Facility: MEDICAL CENTER | Age: 69
End: 2024-08-02

## 2024-08-02 ENCOUNTER — NON-PROVIDER VISIT (OUTPATIENT)
Dept: VASCULAR LAB | Facility: MEDICAL CENTER | Age: 69
End: 2024-08-02
Attending: INTERNAL MEDICINE
Payer: MEDICARE

## 2024-08-02 VITALS
HEIGHT: 69 IN | WEIGHT: 253 LBS | DIASTOLIC BLOOD PRESSURE: 74 MMHG | SYSTOLIC BLOOD PRESSURE: 128 MMHG | BODY MASS INDEX: 37.47 KG/M2 | HEART RATE: 77 BPM

## 2024-08-02 DIAGNOSIS — E11.9 TYPE 2 DIABETES MELLITUS WITHOUT COMPLICATION, WITHOUT LONG-TERM CURRENT USE OF INSULIN (HCC): ICD-10-CM

## 2024-08-02 PROCEDURE — 99212 OFFICE O/P EST SF 10 MIN: CPT

## 2024-08-02 RX ORDER — SEMAGLUTIDE 2.68 MG/ML
2 INJECTION, SOLUTION SUBCUTANEOUS
Qty: 99 ML | Refills: 3 | Status: SHIPPED | OUTPATIENT
Start: 2024-08-02

## 2024-08-02 ASSESSMENT — FIBROSIS 4 INDEX: FIB4 SCORE: 1.62

## 2024-08-02 NOTE — PROGRESS NOTES
Patient Consult Note      Primary care physician: Lisa Otero M.D.    Reason for consult: Management of Controlled Type 2 Diabetes    HPI:  Nando Nelson is a 69 y.o. old patient who comes in today for evaluation of above stated problem.     Allergies  Sagebrush allergy skin test    Current Diabetes Medication Regimen  GLP-1 or GLP-1/GIP Agent: semaglutide (Ozempic) 1 mg weekly    Previous Diabetes Medications and Reason for Discontinuation  none    Potential Barriers to Care:  Adherence: denies missed doses  Side effects: none  Affordability: none    SMBG  Pt has home glucometer and proper testing technique - yes  Discussed BG Goals: FBG 80 - 130, 2hPP < 180, A1c < 7.0%    Blood Glucose: n/a    Hypoglycemia  Hypoglycemia awareness: Yes  Nocturnal hypoglycemia: None  Hypoglycemia:  None  Pt's treatment of Hypoglycemia    Lifestyle  Current Exercise - moderate hiking/riding bike for about an hour 3-4 times a week.     Dietary - No big changes, patient trying to stay on low carbohydrates and low sugar foods. Notes that he has been traveling a lot recently and his diet does slip up while on these trips but has been working to tighten them up.   Snacks - salt free nuts, cheese  Drinks - if he has a soda it is diet but mainly drinking water    Labs  Lab Results   Component Value Date/Time    HBA1C 5.8 06/21/2024 12:00 AM      Lab Results   Component Value Date/Time    SODIUM 140 02/12/2024 08:13 AM    POTASSIUM 4.5 02/12/2024 08:13 AM    CHLORIDE 105 02/12/2024 08:13 AM    CO2 23 02/12/2024 08:13 AM    GLUCOSE 127 (H) 02/12/2024 08:13 AM    BUN 20 02/12/2024 08:13 AM    CREATININE 0.98 02/12/2024 08:13 AM     Lab Results   Component Value Date/Time    ALKPHOSPHAT 79 02/12/2024 08:13 AM    ASTSGOT 25 02/12/2024 08:13 AM    ALTSGPT 29 02/12/2024 08:13 AM    TBILIRUBIN 0.5 02/12/2024 08:13 AM    INR 0.98 06/15/2023 10:07 AM    ALBUMIN 4.0 02/12/2024 08:13 AM      Lab Results   Component Value Date/Time    CHOLSTRLTOT  "121 02/12/2024 08:13 AM    LDL 55 02/12/2024 08:13 AM    HDL 57 02/12/2024 08:13 AM    TRIGLYCERIDE 47 02/12/2024 08:13 AM       Lab Results   Component Value Date/Time    MALBCRT 9 02/12/2024 08:12 AM    MICROALBUR 1.7 02/12/2024 08:12 AM       Physical Examination:  Vital signs: /74   Pulse 77   Ht 1.753 m (5' 9\")   Wt 115 kg (253 lb)   BMI 37.36 kg/m²  Body mass index is 37.36 kg/m².    Assessment and Plan:    1. DM2  Basic physiology of DMII was explained to patient as well as microvascular/macrovascular complications. The importance of increasing physical activity to improve diabetes control was discussed with the patient. Patient was also educated on changing diet and making better choices to help control blood sugar.   Discussed Goals: FBG 80 - 130, 2hPP < 180, a1c < 7.0%  Last a1c drawn on 6/21/24 was 5.8%, which is at goal and has improved since the previous reading  Pt has been tolerating Ozempic 1 mg, he mentioned that his weight has not changed much since increasing the dose and would like to try increasing to the next dose.   Weight has decreased about 7kg in the last 6 months.  Discussed with patient the importance of getting a retinal exam.     - Medication changes:  Increase Ozempic 2 mg weekly   Pt to resume Ozempic 1 mg weekly if he has S/E from 2 mg dose.     - Lifestyle changes:  Exercise Goal - Continue with biking and hikes.   Dietary Goal - Continue to work on improving diet as discussed.    - Preventative management:  REC DM Score: 1   Care gaps addressed: Patient instructed to seek a retinal exam on his next visit with PCP (note placed in upcoming appointment)  Care gaps updated in Health Maintenance    Follow Up:  3 months    Sylvester Garay, Natan Meeks, AaronD      CC:   Lisa Otero M.D.   Ken Hobson MD  "

## 2024-08-02 NOTE — TELEPHONE ENCOUNTER
Received [employer] request via [employer]  for Ozempic 2mg/dose or 8mg/3mL SOPN. (Quantity:3mL, Day Supply:28)     Insurance: [employer]  Member ID:  [id number]  BIN: [id number]  PCN: [id number]  Group: [id number]     Ran Test claim via [employer] Pays for a $11.20/28ds copay. Will outreach to patient to offer specialty pharmacy services and or release to preferred pharmacy    Thank you,  Tamia Canada  Pharmacy Liaison  [facility]  [phone]  8/2/2024 4:59 PM

## 2024-08-05 NOTE — TELEPHONE ENCOUNTER
Pt called and left a detailed message when I was out of the office.     Called and s/w pt today in regards to his copay concerns with his Ozempic. Pt opted in to apply for financial assistance through [employer].     Set up a meeting with the pt in-person tomorrow, at around 10:30 am. Pt will bring all the necessary documents needed to complete the application, including his active prescription insurance, medical, proof of income.     HARI Martinez, PhT  Vascular Pharmacy Liaison (Rx Coordinator)  P: [phone]  8/5/2024 11:15 AM

## 2024-08-06 NOTE — TELEPHONE ENCOUNTER
Patient Phone Number: [phone]  Medication: Ozempic 2mg/dose or 8mg/3mL SOPN  (Quantity 9 mls, Day Supply 90)  Copay: $ 350.64  Household size:2  Annual Household Adjusted Gross Income: $31,017  Additional information/consent to FA: YES!     S/w pt today and was able to obtained all the signatures needed to complete the FA process for his Ozempic. Pt states that he still have about 2 more doses left to last him for 2 more weeks. Informed pt for how long the process may take before we get a final determination. Pt verbally understood.       HARI Martinez, PhT  Vascular Pharmacy Liaison (Rx Coordinator)  P: [phone]  8/6/2024 11:18 AM

## 2024-08-06 NOTE — TELEPHONE ENCOUNTER
Faxed and submitted the complete paperwork for the FA process for the Ozempic through the [employer] @ [phone].     Will follow up within 24-48 business hours.    HARI Martinez, PhT  Vascular Pharmacy Liaison (Rx Coordinator)  P: [phone]  8/6/2024 11:19 AM

## 2024-08-08 NOTE — TELEPHONE ENCOUNTER
Medication: Ozempic 2mg/dose or 8mg/3mL SOPN   Type of Insurance: Government funded (Medicare/Medicare Advantage)  Type of Financial assistance requested Foundation  Source: [employer]   Source Phone #: [phone]  Outcome: APPROVED  Effective dates: 08/08/2024-12/31/2024  Details/Billing Information: FREE DRUG PROGRAM     Final Copay: $0      HARI Martinez, PhT  Vascular Pharmacy Liaison (Rx Coordinator)  P: [phone]  8/8/2024 9:48 AM

## 2024-08-08 NOTE — TELEPHONE ENCOUNTER
Called [employer] @ [phone] and s/w Wesley to obtain current FA status for the pt. Per Wesley, pt just got approved today for his FA application. It will take atleast 10-14 business days from the date it was approved. Pt is also eligible to re-enroll with financial assistance by 10/15/2024, the latest.     Called pt today and s/w pt's wife in regards to the status. Per vera, she requested for me to give him a call back in about 2 hours from now.     Will attempt to call the pt back at around 1130AM today.     HARI Martinez, PhT  Vascular Pharmacy Liaison (Rx Coordinator)  P: [phone]  8/8/2024 9:53 AM

## 2024-08-11 NOTE — PROGRESS NOTES
Subjective:     CC:   Chief Complaint   Patient presents with    Establish Care       HPI:   Nando presents today to establish care.  Patient does have a history of bladder cancer and he is going to be getting another procedure done by urology.  Patient states he has already been cleared for this procedure.  Patient does have sleep apnea patient has not seen sleep doctor when I reviewing his chart and a little bit more than a year.  I do not see any current labs like cholesterol done on this patient last hemoglobin A1c was done more than a year ago was slightly elevated at 6.    Past Medical History:   Diagnosis Date    Daytime sleepiness     Gasping for breath     sometimes    History of elevated glucose     RICHARD on CPAP 2022    Seasonal allergies 2022    Malachi    Snoring     DX RICHARD    Urinary bladder disorder 2010    Bladder Cancer       Social History     Tobacco Use    Smoking status: Former     Packs/day: 0.50     Types: Cigarettes     Quit date:      Years since quittin.4    Smokeless tobacco: Never   Vaping Use    Vaping Use: Never used   Substance Use Topics    Alcohol use: Yes     Comment: Occ    Drug use: Yes     Types: Marijuana       Current Outpatient Medications Ordered in Epic   Medication Sig Dispense Refill    amoxicillin-clavulanate (AUGMENTIN) 500-125 MG Tab Augmentin 500 mg-125 mg tablet   Take 1 tablet twice a day by oral route as directed for 7 days.      albuterol 108 (90 Base) MCG/ACT Aero Soln inhalation aerosol albuterol sulfate HFA 90 mcg/actuation aerosol inhaler   INHALE 1 TO 2 PUFFS BY MOUTH EVERY 4 HOURS AS NEEDED      Multiple Vitamin (MULTIVITAMIN ADULT PO) Take  by mouth.      Azelastine HCl 0.15 % Solution Administer 2 Sprays into each nostril.      Fexofenadine HCl (ALLEGRA ALLERGY PO) Take  by mouth as needed.      B Complex Cap Take  by mouth every day.       No current [facility]-ordered facility-administered medications on file.       Allergies:  Sagebrush allergy  "skin test    Health Maintenance: Completed    ROS:  Gen: no fevers/chills, patient has gained 16 pounds in 9 months  Eyes: no changes in vision  ENT: no sore throat, no hearing loss, no bloody nose  Pulm: no sob, no cough  CV: no chest pain, no palpitations  GI: no nausea/vomiting, no diarrhea  Neuro: no headaches, no numbness/tingling  Heme/Lymph: no easy bruising    Objective:     Exam:  /68 (BP Location: Left arm, Patient Position: Sitting, BP Cuff Size: Large adult)   Pulse 83   Temp 36.9 °C (98.4 °F) (Temporal)   Resp 18   Ht 1.753 m (5' 9\")   Wt 122 kg (268 lb 14.4 oz)   SpO2 96%   BMI 39.71 kg/m²  Body mass index is 39.71 kg/m².    Gen: Alert and oriented, No apparent distress.  Skin: Warm and dry.  No obvious lesions.  Eyes: Sclera wnl Pupils normal in size  ENT: Canals wnl and TM are not red  Lungs: Normal effort, CTA bilaterally, no wheezes, rhonchi, or rales  CV: Regular rate and rhythm. No murmurs, rubs, or gallops.  Musculoskeletal: Normal gait. No extremity cyanosis, clubbing, or edema.  Neuro: Oriented to person, place and time  Psych: Mood is wnl       Labs: I did give patient a listing of all the [facility] labs he should have his lab work done fasting    Assessment & Plan:     68 y.o. male with the following -     1. Hyperglycemia  Patient to get his lab work done we will see him back for follow-up  - Comp Metabolic Panel; Future  - HEMOGLOBIN A1C; Future    2. Hyperlipidemia, unspecified hyperlipidemia type  Recommend he get this lab done I should see him back for follow-up  - Lipid Profile; Future    3. High alanine aminotransferase (ALT) level  Need to recheck his liver test    4. Weight gain  Would recommend doing a thyroid test due to fact he has gained 16 pounds  - TSH; Future    5. Kidney stones  Patient does have a history of kidney stones we will check his uric acid  - CBC WITH DIFFERENTIAL; Future  - URIC ACID; Future    6. Vitamin D deficiency  Recommend checking for vitamin D " deficiency  - VITAMIN D,25 HYDROXY (DEFICIENCY); Future    7. Sleep apnea, unspecified type  Referral was written for patient to see sleep clinic.  Patient already knows to bring his CPAP machine with him when he goes for his procedure.  The procedure is supposed to be only outpatient.  I also warned patient about narcotics and sleeping pills with sleep apnea to not to take if his surgeon provides a sleeping pill and a narcotic do not take both at the same time.  - Referral to Pulmonary and Sleep Medicine    Return in about 4 weeks (around 7/19/2023), or if symptoms worsen or fail to improve.    Please note that this dictation was created using voice recognition software. I have made every reasonable attempt to correct obvious errors, but I expect that there are errors of grammar and possibly content that I did not discover before finalizing the note.   Myles Costello(Attending)

## 2024-08-19 ENCOUNTER — HOSPITAL ENCOUNTER (OUTPATIENT)
Dept: LAB | Facility: MEDICAL CENTER | Age: 69
End: 2024-08-19
Attending: FAMILY MEDICINE
Payer: MEDICARE

## 2024-08-19 DIAGNOSIS — E55.9 VITAMIN D DEFICIENCY: ICD-10-CM

## 2024-08-19 LAB — 25(OH)D3 SERPL-MCNC: 55 NG/ML (ref 30–100)

## 2024-08-19 PROCEDURE — 36415 COLL VENOUS BLD VENIPUNCTURE: CPT

## 2024-08-19 PROCEDURE — 82306 VITAMIN D 25 HYDROXY: CPT

## 2024-08-22 ENCOUNTER — TELEPHONE (OUTPATIENT)
Dept: VASCULAR LAB | Facility: MEDICAL CENTER | Age: 69
End: 2024-08-22
Payer: MEDICARE

## 2024-08-22 NOTE — TELEPHONE ENCOUNTER
Pt called and lvm stating that he haven't received any call from the vascular clinic for a medication  for Ozempic.     Verified and confirmed with clinical Hs that there was no medication ready for  for the pt.     Called [facility] @ [phone] and s/w Bethanie to obtain delivery status for the pt. Per Bethanie, medication is on its way to be delivered to our clinic. Provided the UPS tracking number as [id number].     Called and s/w to pt back about the updated delivery status for his medication. Pt verbalized understanding.     HARI Martinez, PhT  Vascular Pharmacy Liaison (Rx Coordinator)  P: [phone]  8/22/2024 9:11 AM

## 2024-08-22 NOTE — TELEPHONE ENCOUNTER
Received pt's Ozempic PAP shipment. Called and notified pt. Instructed to p/u at [facility] anytime Mon-Fri from 8-5, away for lunch 12-1.    Baltazar Holley, AaronD, BCACP

## 2024-09-04 ENCOUNTER — APPOINTMENT (OUTPATIENT)
Dept: MEDICAL GROUP | Facility: PHYSICIAN GROUP | Age: 69
End: 2024-09-04
Payer: MEDICARE

## 2024-09-04 VITALS
OXYGEN SATURATION: 95 % | RESPIRATION RATE: 18 BRPM | HEIGHT: 69 IN | HEART RATE: 84 BPM | DIASTOLIC BLOOD PRESSURE: 66 MMHG | BODY MASS INDEX: 36.61 KG/M2 | SYSTOLIC BLOOD PRESSURE: 118 MMHG | WEIGHT: 247.2 LBS | TEMPERATURE: 97.4 F

## 2024-09-04 DIAGNOSIS — E55.9 VITAMIN D DEFICIENCY: ICD-10-CM

## 2024-09-04 DIAGNOSIS — E11.9 TYPE 2 DIABETES MELLITUS WITHOUT COMPLICATION, WITHOUT LONG-TERM CURRENT USE OF INSULIN (HCC): ICD-10-CM

## 2024-09-04 PROBLEM — R73.9 HYPERGLYCEMIA: Status: RESOLVED | Noted: 2023-06-21 | Resolved: 2024-09-04

## 2024-09-04 PROBLEM — R73.03 PREDIABETES: Status: RESOLVED | Noted: 2024-01-08 | Resolved: 2024-09-04

## 2024-09-04 PROCEDURE — 3078F DIAST BP <80 MM HG: CPT | Performed by: FAMILY MEDICINE

## 2024-09-04 PROCEDURE — 99213 OFFICE O/P EST LOW 20 MIN: CPT | Performed by: FAMILY MEDICINE

## 2024-09-04 PROCEDURE — 3074F SYST BP LT 130 MM HG: CPT | Performed by: FAMILY MEDICINE

## 2024-09-04 RX ORDER — ERGOCALCIFEROL 1.25 MG/1
50000 CAPSULE, LIQUID FILLED ORAL
Qty: 13 CAPSULE | Refills: 1 | Status: SHIPPED | OUTPATIENT
Start: 2024-09-04

## 2024-09-04 RX ORDER — AMOXICILLIN 500 MG/1
CAPSULE ORAL
COMMUNITY
Start: 2024-08-21

## 2024-09-04 ASSESSMENT — FIBROSIS 4 INDEX: FIB4 SCORE: 1.62

## 2024-09-04 NOTE — PROGRESS NOTES
Subjective:     CC:   Chief Complaint   Patient presents with    Follow-Up       HPI:   Nando presents today for follow-up.  Patient is seen pharmacotherapy they do have him on injectable and he has been losing weight.  Looks like he has another follow-up appointment with them in November.  Patient is still seeing urology status post follow-up for his about bladder cancer he did have a procedure done on his prostate he feels fairly certain that there he are following him on his PSAs.    Past Medical History:   Diagnosis Date    Daytime sleepiness     Gasping for breath     sometimes    History of elevated glucose     RICHARD on CPAP 2022    Seasonal allergies 2022    Malachi    Snoring     DX RICHARD    Urinary bladder disorder     Bladder Cancer       Social History     Tobacco Use    Smoking status: Former     Current packs/day: 0.00     Types: Cigarettes     Quit date:      Years since quittin.6    Smokeless tobacco: Never   Vaping Use    Vaping status: Never Used   Substance Use Topics    Alcohol use: Yes     Comment: Occ    Drug use: Yes     Types: Marijuana       Current Outpatient Medications Ordered in Epic   Medication Sig Dispense Refill    amoxicillin (AMOXIL) 500 MG Cap       vitamin D2, Ergocalciferol, (DRISDOL) 1.25 MG (90261 UT) Cap capsule TAKE 1 CAPSULE BY MOUTH EVERY 7 DAYS 13 Capsule 1    Semaglutide, 2 MG/DOSE, (OZEMPIC, 2 MG/DOSE,) 8 MG/3ML Solution Pen-injector Inject 2 mg under the skin every 7 days. 99 mL 3    telmisartan (MICARDIS) 40 MG Tab Take 1 Tablet by mouth every day. to lower blood pressure 90 Tablet 3    aspirin 81 MG EC tablet Take 1 Tablet by mouth every day. 30 Tablet     rosuvastatin (CRESTOR) 20 MG Tab Take 1 Tablet by mouth every evening. 90 Tablet 3    Multiple Vitamin (MULTIVITAMIN ADULT PO) Take  by mouth.      Azelastine HCl 0.15 % Solution Administer 2 Sprays into each nostril.      Fexofenadine HCl (ALLEGRA ALLERGY PO) Take  by mouth as needed.      B  "Complex Cap Take  by mouth every day.       No current Epic-ordered facility-administered medications on file.       Allergies:  Sagebrush allergy skin test    Health Maintenance: Completed    ROS:  Gen: no fevers/chills  Eyes: no changes in vision  ENT: no sore throat, no hearing loss, no bloody nose  Pulm: no sob, no cough  CV: no chest pain, no palpitations  GI: no nausea/vomiting, no diarrhea  : no dysuria  Neuro: no headaches, no numbness/tingling  Heme/Lymph: no easy bruising    Objective:     Exam:  /66 (BP Location: Left arm, Patient Position: Sitting, BP Cuff Size: Large adult)   Pulse 84   Temp 36.3 °C (97.4 °F) (Temporal)   Resp 18   Ht 1.753 m (5' 9\")   Wt 112 kg (247 lb 3.2 oz)   SpO2 95%   BMI 36.51 kg/m²  Body mass index is 36.51 kg/m².    Gen: Alert and oriented, No apparent distress.  Skin: Warm and dry.  No obvious lesions.  Eyes: Sclera wnl Pupils normal in size  Lungs: Normal effort, CTA bilaterally, no wheezes, rhonchi, or rales  CV: Regular rate and rhythm. No murmurs, rubs, or gallops.  Musculoskeletal: Normal gait. No extremity cyanosis, clubbing, or edema.  Neuro: Oriented to person, place and time  Psych: Mood is wnl       Assessment & Plan:     69 y.o. male with the following -     1. Type 2 diabetes mellitus without complication, without long-term current use of insulin ([facility])  - POCT Retinal Eye Exam  My MA did try to do the retinal screen but was not yet unable to.  I will go ahead and write a referral to ophthalmology for a formal eye exam.  2.  Vitamin D deficiency  Vitamin D level looks at good range patient continue the prescription for vitamin D once a week.    Return in about 6 months (around 3/4/2025), or if symptoms worsen or fail to improve.    Please note that this dictation was created using voice recognition software. I have made every reasonable attempt to correct obvious errors, but I expect that there are errors of grammar and possibly content that I did not " discover before finalizing the note.

## 2024-10-15 ENCOUNTER — HOSPITAL ENCOUNTER (OUTPATIENT)
Dept: CARDIOLOGY | Facility: MEDICAL CENTER | Age: 69
End: 2024-10-15
Attending: UROLOGY
Payer: MEDICARE

## 2024-10-15 ENCOUNTER — HOSPITAL ENCOUNTER (OUTPATIENT)
Dept: LAB | Facility: MEDICAL CENTER | Age: 69
End: 2024-10-15
Attending: UROLOGY
Payer: MEDICARE

## 2024-10-15 LAB
ANION GAP SERPL CALC-SCNC: 10 MMOL/L (ref 7–16)
APPEARANCE UR: CLEAR
BASOPHILS # BLD AUTO: 0.8 % (ref 0–1.8)
BASOPHILS # BLD: 0.06 K/UL (ref 0–0.12)
BILIRUB UR QL STRIP.AUTO: NEGATIVE
BUN SERPL-MCNC: 18 MG/DL (ref 8–22)
CALCIUM SERPL-MCNC: 10.5 MG/DL (ref 8.5–10.5)
CHLORIDE SERPL-SCNC: 104 MMOL/L (ref 96–112)
CO2 SERPL-SCNC: 27 MMOL/L (ref 20–33)
COLOR UR: NORMAL
CREAT SERPL-MCNC: 1.01 MG/DL (ref 0.5–1.4)
EKG IMPRESSION: NORMAL
EOSINOPHIL # BLD AUTO: 0.09 K/UL (ref 0–0.51)
EOSINOPHIL NFR BLD: 1.2 % (ref 0–6.9)
ERYTHROCYTE [DISTWIDTH] IN BLOOD BY AUTOMATED COUNT: 45.1 FL (ref 35.9–50)
GFR SERPLBLD CREATININE-BSD FMLA CKD-EPI: 81 ML/MIN/1.73 M 2
GLUCOSE SERPL-MCNC: 120 MG/DL (ref 65–99)
GLUCOSE UR STRIP.AUTO-MCNC: NEGATIVE MG/DL
HCT VFR BLD AUTO: 46.4 % (ref 42–52)
HGB BLD-MCNC: 15.1 G/DL (ref 14–18)
IMM GRANULOCYTES # BLD AUTO: 0.02 K/UL (ref 0–0.11)
IMM GRANULOCYTES NFR BLD AUTO: 0.3 % (ref 0–0.9)
INR PPP: 1.05 (ref 0.87–1.13)
KETONES UR STRIP.AUTO-MCNC: NEGATIVE MG/DL
LEUKOCYTE ESTERASE UR QL STRIP.AUTO: NEGATIVE
LYMPHOCYTES # BLD AUTO: 1.44 K/UL (ref 1–4.8)
LYMPHOCYTES NFR BLD: 19.2 % (ref 22–41)
MCH RBC QN AUTO: 29.5 PG (ref 27–33)
MCHC RBC AUTO-ENTMCNC: 32.5 G/DL (ref 32.3–36.5)
MCV RBC AUTO: 90.6 FL (ref 81.4–97.8)
MICRO URNS: NORMAL
MONOCYTES # BLD AUTO: 0.46 K/UL (ref 0–0.85)
MONOCYTES NFR BLD AUTO: 6.1 % (ref 0–13.4)
NEUTROPHILS # BLD AUTO: 5.43 K/UL (ref 1.82–7.42)
NEUTROPHILS NFR BLD: 72.4 % (ref 44–72)
NITRITE UR QL STRIP.AUTO: NEGATIVE
NRBC # BLD AUTO: 0 K/UL
NRBC BLD-RTO: 0 /100 WBC (ref 0–0.2)
PH UR STRIP.AUTO: 6 [PH] (ref 5–8)
PLATELET # BLD AUTO: 213 K/UL (ref 164–446)
PMV BLD AUTO: 10.1 FL (ref 9–12.9)
POTASSIUM SERPL-SCNC: 5.3 MMOL/L (ref 3.6–5.5)
PROT UR QL STRIP: NEGATIVE MG/DL
PROTHROMBIN TIME: 13.7 SEC (ref 12–14.6)
RBC # BLD AUTO: 5.12 M/UL (ref 4.7–6.1)
RBC UR QL AUTO: NEGATIVE
SODIUM SERPL-SCNC: 141 MMOL/L (ref 135–145)
SP GR UR STRIP.AUTO: 1.02
UROBILINOGEN UR STRIP.AUTO-MCNC: 0.2 MG/DL
WBC # BLD AUTO: 7.5 K/UL (ref 4.8–10.8)

## 2024-10-15 PROCEDURE — 80048 BASIC METABOLIC PNL TOTAL CA: CPT

## 2024-10-15 PROCEDURE — 93005 ELECTROCARDIOGRAM TRACING: CPT | Performed by: UROLOGY

## 2024-10-15 PROCEDURE — 93010 ELECTROCARDIOGRAM REPORT: CPT | Performed by: INTERNAL MEDICINE

## 2024-10-15 PROCEDURE — 85610 PROTHROMBIN TIME: CPT | Mod: GA

## 2024-10-15 PROCEDURE — 85025 COMPLETE CBC W/AUTO DIFF WBC: CPT

## 2024-10-15 PROCEDURE — 36415 COLL VENOUS BLD VENIPUNCTURE: CPT

## 2024-10-15 PROCEDURE — 81003 URINALYSIS AUTO W/O SCOPE: CPT

## 2024-10-15 PROCEDURE — 87086 URINE CULTURE/COLONY COUNT: CPT

## 2024-10-17 LAB
BACTERIA UR CULT: NORMAL
SIGNIFICANT IND 70042: NORMAL
SITE SITE: NORMAL
SOURCE SOURCE: NORMAL

## 2024-10-25 ENCOUNTER — TELEPHONE (OUTPATIENT)
Dept: VASCULAR LAB | Facility: MEDICAL CENTER | Age: 69
End: 2024-10-25
Payer: MEDICARE

## 2024-11-01 ENCOUNTER — APPOINTMENT (OUTPATIENT)
Dept: VASCULAR LAB | Facility: MEDICAL CENTER | Age: 69
End: 2024-11-01
Attending: INTERNAL MEDICINE
Payer: MEDICARE

## 2024-11-01 VITALS — SYSTOLIC BLOOD PRESSURE: 133 MMHG | DIASTOLIC BLOOD PRESSURE: 78 MMHG | HEART RATE: 88 BPM

## 2024-11-01 LAB — HBA1C MFR BLD: 5.7 % (ref ?–5.8)

## 2024-11-01 PROCEDURE — 99212 OFFICE O/P EST SF 10 MIN: CPT

## 2024-11-01 NOTE — PROGRESS NOTES
Patient Consult Note        Primary care physician: Lisa Otero M.D.    Reason for consult: Management of Controlled Type 2 Diabetes    HPI:  Nando Nelson is a 69 y.o. old patient who comes in today for evaluation of above stated problem.    Allergies  Sagebrush allergy skin test    Current Diabetes Medication Regimen  GLP-1 or GLP-1/GIP Agent: semaglutide (Ozempic) 2 mg weekly      Previous Diabetes Medications and Reason for Discontinuation  N/A    Potential Barriers to Care:  Adherence: denies missed doses  Side effects: none  Affordability: no issues at this time - pt on PAP    SMBG  Pt has home glucometer and proper testing technique - yes    Pt reports blood sugars:   Before Breakfast: 100-120      Hypoglycemia  Hypoglycemia awareness: Yes  Nocturnal hypoglycemia: None  Hypoglycemia:  None  Pt's treatment of Hypoglycemia  Discussed 15:15 Rule    Lifestyle  Current Exercise - moderate hikes 3 mile hikes 3-4 x week    Dietary - low carb diet  Breakfast - hard boiled egg, cottage cheese, toast ,coffee  Lunch - 1/2 sandwich, salad, fruit  Dinner - veggies, lean meats   Snacks - peanuts  Drinks - water, carbonated water, diet soda    Labs  Lab Results   Component Value Date/Time    HBA1C 5.8 06/21/2024 12:00 AM    HBA1C 6.4 (A) 03/12/2024 09:05 AM    HBA1C 6.8 (H) 12/05/2023 09:44 AM      Lab Results   Component Value Date/Time    SODIUM 141 10/15/2024 09:51 AM    POTASSIUM 5.3 10/15/2024 09:51 AM    CHLORIDE 104 10/15/2024 09:51 AM    CO2 27 10/15/2024 09:51 AM    GLUCOSE 120 (H) 10/15/2024 09:51 AM    BUN 18 10/15/2024 09:51 AM    CREATININE 1.01 10/15/2024 09:51 AM     Lab Results   Component Value Date/Time    ALKPHOSPHAT 79 02/12/2024 08:13 AM    ASTSGOT 25 02/12/2024 08:13 AM    ALTSGPT 29 02/12/2024 08:13 AM    TBILIRUBIN 0.5 02/12/2024 08:13 AM    INR 1.05 10/15/2024 09:51 AM    ALBUMIN 4.0 02/12/2024 08:13 AM      Lab Results   Component Value Date/Time    CHOLSTRLTOT 121 02/12/2024 08:13 AM    LDL  55 02/12/2024 08:13 AM    HDL 57 02/12/2024 08:13 AM    TRIGLYCERIDE 47 02/12/2024 08:13 AM       Lab Results   Component Value Date/Time    MALBCRT 9 02/12/2024 08:12 AM    MICROALBUR 1.7 02/12/2024 08:12 AM     Preventative Management  BP regimen (ACE/ARB) - Telmisartan 40mg QD  BP < 140/90 - yes  Statin - Rosuvastatin 20mg QD   LDL <100 - yes  Last Retinal Scan - attempted at PCP but referred to optometrist   Last Microalbumin/Cr - due 2/2025  Monofilament - due 3/2025    Physical Examination:  Vital signs: /78   Pulse 88  There is no height or weight on file to calculate BMI.    Assessment and Plan:    1. DM2  Patient presents to clinic today to follow up on DM management   Discussed Goals: FBG 80 - 130, 2hPP < 180, a1c < 7.0%  Last a1c drawn again TODAY (11/1/24) and it was 5.7%, which is at goal and has improved since the previous reading of 5.8% (6/21/24)  Patient reports that his FBG ranges about 100-120 which is also in range  Patient has been on Ozempic 2mg Q7d for several weeks and is tolerating it just fine with no issues or complaints   Patient filled out renewal PAP application today in clinic as well with help of [employer].     - Medication changes:  NONE   - Continue:  CONTINUE with Ozempic 2mg Q7d     - Lifestyle changes:  Exercise Goal - Continue with current active regimen and increase as tolerable   Dietary Goal - continue to eat low carb intake and healthy choices maximizing lean meats/protein and veggies.    - Preventative management:  REC DM Score: 1  Care gaps addressed:   Retinal exam due: Reminded patient to complete retinal exam  Care gaps updated in Health Maintenance    Follow Up:  3 months    Jona Andino  PharmD      CC:   Lisa Otero M.D.

## 2024-11-20 ENCOUNTER — HOSPITAL ENCOUNTER (OUTPATIENT)
Dept: RADIOLOGY | Facility: MEDICAL CENTER | Age: 69
End: 2024-11-20
Attending: UROLOGY
Payer: MEDICARE

## 2024-11-20 DIAGNOSIS — C67.9 MALIGNANT PAPILLARY CARCINOMA OF BLADDER (HCC): ICD-10-CM

## 2024-11-20 PROCEDURE — 700117 HCHG RX CONTRAST REV CODE 255: Performed by: UROLOGY

## 2024-11-20 PROCEDURE — 74178 CT ABD&PLV WO CNTR FLWD CNTR: CPT

## 2024-11-20 RX ADMIN — IOHEXOL 100 ML: 350 INJECTION, SOLUTION INTRAVENOUS at 12:11

## 2024-12-09 ENCOUNTER — PHARMACY VISIT (OUTPATIENT)
Dept: PHARMACY | Facility: MEDICAL CENTER | Age: 69
End: 2024-12-09
Payer: COMMERCIAL

## 2024-12-09 PROCEDURE — RXMED WILLOW AMBULATORY MEDICATION CHARGE

## 2024-12-19 ENCOUNTER — TELEPHONE (OUTPATIENT)
Dept: VASCULAR LAB | Facility: MEDICAL CENTER | Age: 69
End: 2024-12-19
Payer: MEDICARE

## 2024-12-20 NOTE — TELEPHONE ENCOUNTER
Received pt's Ozempic PAP shipment. Called and notified pt. Instructed to p/u at [facility] anytime Mon-Fri from 8-4, away for lunch 12-1.    Baltazar Holley, AaronD, BCACP

## 2025-01-29 DIAGNOSIS — E78.49 OTHER HYPERLIPIDEMIA: ICD-10-CM

## 2025-01-29 DIAGNOSIS — I71.41 PARARENAL ABDOMINAL AORTIC ANEURYSM (AAA) WITHOUT RUPTURE (HCC): Chronic | ICD-10-CM

## 2025-01-29 RX ORDER — ROSUVASTATIN CALCIUM 20 MG/1
20 TABLET, COATED ORAL EVERY EVENING
Qty: 100 TABLET | Refills: 3 | Status: SHIPPED | OUTPATIENT
Start: 2025-01-29

## 2025-01-31 ENCOUNTER — OFFICE VISIT (OUTPATIENT)
Dept: VASCULAR LAB | Facility: MEDICAL CENTER | Age: 70
End: 2025-01-31
Payer: MEDICARE

## 2025-01-31 VITALS
WEIGHT: 240 LBS | HEART RATE: 82 BPM | HEIGHT: 69 IN | BODY MASS INDEX: 35.55 KG/M2 | DIASTOLIC BLOOD PRESSURE: 78 MMHG | SYSTOLIC BLOOD PRESSURE: 121 MMHG

## 2025-01-31 DIAGNOSIS — E78.49 OTHER HYPERLIPIDEMIA: ICD-10-CM

## 2025-01-31 DIAGNOSIS — I77.811 ABDOMINAL AORTIC ECTASIA (HCC): ICD-10-CM

## 2025-01-31 DIAGNOSIS — Z79.02 ANTIPLATELET OR ANTITHROMBOTIC LONG-TERM USE: ICD-10-CM

## 2025-01-31 DIAGNOSIS — E11.9 TYPE 2 DIABETES MELLITUS WITHOUT COMPLICATION, WITHOUT LONG-TERM CURRENT USE OF INSULIN (HCC): ICD-10-CM

## 2025-01-31 DIAGNOSIS — I70.0 ABDOMINAL AORTIC ATHEROSCLEROSIS (HCC): ICD-10-CM

## 2025-01-31 DIAGNOSIS — I10 PRIMARY HYPERTENSION: ICD-10-CM

## 2025-01-31 PROCEDURE — 99212 OFFICE O/P EST SF 10 MIN: CPT

## 2025-01-31 ASSESSMENT — ENCOUNTER SYMPTOMS
MYALGIAS: 0
CHILLS: 0
ABDOMINAL PAIN: 0
CLAUDICATION: 0
HEMOPTYSIS: 0
SHORTNESS OF BREATH: 0
PALPITATIONS: 0
COUGH: 0
DIZZINESS: 0
FEVER: 0
FOCAL WEAKNESS: 0
SPEECH CHANGE: 0
HEADACHES: 0
BRUISES/BLEEDS EASILY: 0
BLOOD IN STOOL: 0
WEAKNESS: 0

## 2025-01-31 ASSESSMENT — FIBROSIS 4 INDEX: FIB4 SCORE: 1.48

## 2025-01-31 NOTE — PROGRESS NOTES
FOLLOW-UP VASCULAR MEDICINE VISIT  2025    Nando Nelson is a 68 yo male  who presents for f/u   initially referred by Lisa Otero M.D. for eval and med mgmt of AAA, est 2024     Subjective       Interval hx/concerns:   Last seen 2024,   Doing well  Doing last chemo of 6 rounds today for bladder cancer.  Had surgery in October to remove lesion.  And will have another exam in feb which will determine next steps.  Otherwise doing well, no concerns  Continues to follow with DM clinic - has upcoming appt   On ozempic and tolerating  Has lost another 14lbs since July!  Decreased portions - watching sugars/carbs  Less exercise than prior since chemo treatments, but is still going to gym 2-3x/week  No current labs      Abdominal aortic dilation (prior thought to be aneurysm):  Current CTA imaging shows 2.8cm, down from prior duplex showing 3.4cm.  no new sx.   Pertinent pmhx:  Initial sizing: 3.4cm on duplex 2023   Prior interventions: none   Prior ASCVD events: none   Fam hx of aneurysmal disease: no   reports that he quit smoking about 35 years ago. His smoking use included cigarettes. He has never used smokeless tobacco.     HTN: Stable,   tolerating telmisartan,   good adherence,   Home BP lo-low 130s/70s - good record keeping  Denies dizziness, lightheadedness, no HA or vision changes  Occasional dizziness with rapid position changes  No CP, SOB, palpitations, or leg swelling  Watching salt in diet - little more lax recently    Hyperlipidemia:   Stable,   Current treatment: rosuvastatin 20mg - tolerating     Antiplatelet/anticoagulation:   No bleeding on ASA        Current Outpatient Medications:     rosuvastatin, 20 mg, Oral, Q EVENING, Taking    vitamin D2 (Ergocalciferol), 50,000 Units, Oral, Q7 DAYS, Taking    Ozempic (2 MG/DOSE), 2 mg, Subcutaneous, Q7 DAYS, Taking    telmisartan, 40 mg, Oral, DAILY, Taking    aspirin, 81 mg, Oral, DAILY, Taking    Multiple Vitamin (MULTIVITAMIN ADULT PO),  "Take  by mouth., Taking    Azelastine HCl, Administer 2 Sprays into each nostril., Taking    Fexofenadine HCl (ALLEGRA ALLERGY PO), Take  by mouth as needed., Taking    B Complex, Take  by mouth every day., Taking      Social History     Tobacco Use    Smoking status: Former     Current packs/day: 0.00     Types: Cigarettes     Quit date:      Years since quittin.1    Smokeless tobacco: Never   Vaping Use    Vaping status: Never Used   Substance Use Topics    Alcohol use: Yes     Comment: Occ    Drug use: Yes     Types: Marijuana     Review of Systems   Constitutional:  Negative for chills, fever and malaise/fatigue.   HENT:  Negative for nosebleeds.    Respiratory:  Negative for cough, hemoptysis and shortness of breath.    Cardiovascular:  Negative for chest pain, palpitations, claudication and leg swelling.   Gastrointestinal:  Negative for abdominal pain and blood in stool.   Genitourinary:  Negative for hematuria.   Musculoskeletal:  Negative for myalgias.   Neurological:  Negative for dizziness, speech change, focal weakness, weakness and headaches.   Endo/Heme/Allergies:  Does not bruise/bleed easily.       Objective   Vitals:    25 0947 25 0951   BP: 131/82 121/78   BP Location: Left arm Left arm   Patient Position: Sitting Sitting   BP Cuff Size: Large adult Large adult   Pulse: 79 82   Weight: 109 kg (240 lb)    Height: 1.753 m (5' 9\")       BP Readings from Last 10 Encounters:   25 121/78   24 133/78   24 118/66   24 128/74   24 128/77   24 130/77   24 118/69   24 (!) 146/72   24 118/60   24 (!) 150/81      Body mass index is 35.44 kg/m².  Wt Readings from Last 3 Encounters:   25 109 kg (240 lb)   24 112 kg (247 lb 3.2 oz)   24 115 kg (253 lb)      Physical Exam  Vitals reviewed.   Constitutional:       General: He is not in acute distress.     Appearance: He is well-developed. He is not diaphoretic.   HENT: " "     Head: Normocephalic and atraumatic.   Neck:      Thyroid: No thyromegaly.      Vascular: No carotid bruit or JVD.   Cardiovascular:      Rate and Rhythm: Normal rate and regular rhythm.      Pulses: Normal pulses.      Heart sounds: No murmur heard.  Pulmonary:      Effort: No respiratory distress.      Breath sounds: Normal breath sounds. No wheezing or rales.   Musculoskeletal:         General: No swelling or tenderness.      Right lower leg: No edema.      Left lower leg: No edema.   Skin:     General: Skin is warm and dry.      Coloration: Skin is not pale.   Neurological:      General: No focal deficit present.      Mental Status: He is oriented to person, place, and time.      Coordination: Coordination normal.      Gait: Gait normal.   Psychiatric:         Mood and Affect: Mood normal.         Behavior: Behavior normal.       Lab Results   Component Value Date    CHOLSTRLTOT 121 02/12/2024    LDL 55 02/12/2024    HDL 57 02/12/2024    TRIGLYCERIDE 47 02/12/2024      Lab Results   Component Value Date/Time    LIPOPROTA <6 02/12/2024 08:13 AM      No results found for: \"APOB\"    Lab Results   Component Value Date    PROTHROMBTM 13.7 10/15/2024    INR 1.05 10/15/2024       Lab Results   Component Value Date    HBA1C 5.7 11/01/2024      Lab Results   Component Value Date    SODIUM 141 10/15/2024    POTASSIUM 5.3 10/15/2024    CHLORIDE 104 10/15/2024    CO2 27 10/15/2024    GLUCOSE 120 (H) 10/15/2024    BUN 18 10/15/2024    CREATININE 1.01 10/15/2024        Lab Results   Component Value Date    WBC 7.5 10/15/2024    RBC 5.12 10/15/2024    HEMOGLOBIN 15.1 10/15/2024    HEMATOCRIT 46.4 10/15/2024    MCV 90.6 10/15/2024    MCH 29.5 10/15/2024    MCHC 32.5 10/15/2024    MPV 10.1 10/15/2024     Lab Results   Component Value Date    HBA1C 5.7 11/01/2024      Lab Results   Component Value Date/Time    MALBCRT 9 02/12/2024 08:12 AM    MICROALBUR 1.7 02/12/2024 08:12 AM      VASCULAR IMAGING:    AAA screening duplex " 8/2023   Mild prominence of the proximal abdominal aorta, measuring up to 3.4 cm.     CTA abd 3/1/24  Vascular findings: Abdominal aorta is nonaneurysmal, measuring 2.8 cm maximally. No dissection. Renal, mesenteric, and iliac arteries are patent. Mild scattered plaque throughout these vessels.   Adrenal glands: Normal   1. Abdominal aorta is nonaneurysmal, measuring 2.8 cm in diameter. This represents a more accurate measurement when compared with the previous ultrasound.  2. Tiny liver cyst and hepatic steatosis are noted incidentally.    ABPM 4/2024  Mean daytime: 125 or 69 consistent with well-controlled out of office blood pressure     Nocturnal dip: Well-maintained       Medical Decision Making:  Today's Assessment / Status / Plan:     1. Other hyperlipidemia        2. Type 2 diabetes mellitus without complication, without long-term current use of insulin (HCC)        3. Abdominal aortic ectasia (HCC)        4. Abdominal aortic atherosclerosis (HCC)        5. Primary hypertension        6. Antiplatelet or antithrombotic long-term use                Patient Type: Secondary Prevention    Etiology of Established CVD if Present:     1) mild abd aortic dilation, non-aneurysmal - asymptomatic   8/2023 duplex - initial sizing = 3.4cm, considered small   3/2024 CTA = 2.8cm indicating minimal dilation only, not aneurysmal  - as reviewed duplex scanning can be less accurate and CTA is true measure modality   - AAA when 3.0+cm   - at initial visit, reviewed pathophys and gave informational handouts   Plan:  - continue secondary prevention med mgmt including antiplatelet therapy  - consider repeat Ao/iliac duplex vs CTA in 3yrs (2027)    BLOOD PRESSURE CONTROL   Office BP Goal ACC/AHA (2017) goal <130/80  Home BP at goal:  yes  Office BP at goal:  yes  24h ABPM:  4/2024 125/69 daytime average  RDN candidate? NO  Contributing factors: n/a   LVH on prior EKG   Plan:   Monitoring:   - start/continue home BP monitoring,  reviewed correct technique, provide BP log and instructions  - order 24h ABPM:  yes  - monitor lytes/gfr routinely - order at next appt  Medications:  - continue telmisartan to 40mg daily    LIPID MANAGEMENT:   Qualifies for Statin Therapy Based on 2018 ACC/AHA Guidelines: yes, Secondary Prevention - Very high risk ASCVD - 2 major events or 1 event with other high-risk conditions  The ASCVD Risk score (Jacques MERCER, et al., 2019) failed to calculate.  Major ASCVD events: Aortic aneurysm due to atherosclerosis   High-risk condition: >64yr old  and Hypertension   Currently on Statin: Started at this visit  Tx goals: LDL-C <55   At goal? Yes 2/2024  Plan:   - reinforced ongoing TLC measures as noted   - monitor labs prior to next appt - reprinted order, pt to do prior to PCP appt   Meds:   - continue rosuvastatin 20mg daily   - add zetia next     ANTITHROMBOTIC/ANTIPLATELET THERAPY: continue ASA 81mg daily     GLYCEMIC STATUS: Diabetic  Goal A1c < 7.0  Lab Results   Component Value Date    HBA1C 5.7 11/01/2024      Lab Results   Component Value Date/Time    MALBCRT 9 02/12/2024 08:12 AM    MICROALBUR 1.7 02/12/2024 08:12 AM   Excellent control on most recent labs  Follows with DM clinic  Plan:  - continue current medication plan per DM clinic  -A1c per DM clinic  - recommmend for routine care with PCP (or endocrine) to include regular A1c monitoring, annual albumin/creatinine ratio (ACR), annual diabetic retinopathy screening, foot exams, annual flu vaccine, and updates to pneumonia vaccines as appropriate       LIFESTYLE INTERVENTIONS:    SMOKING: Stages of Change: Maintenance (sustained change >6mo)     reports that he quit smoking about 35 years ago. His smoking use included cigarettes. He has never used smokeless tobacco.   - continued complete avoidance of all tobacco products     PHYSICAL ACTIVITY: continue healthy activity to improve CV fitness.  In general, targeting >150min/week of moderate-level activity or as  much as tolerated in light of functional status and co-morbidities   - avoid excessive lifting, limit to body weight, moderate intensity    WEIGHT MANAGEMENT AND NUTRITION: DASH style dietary approach , Focus on reduced sodium to <2,000mg per day , and Provide specific dietary approach handouts      OTHER: none     Instructed to follow-up with PCP for remainder of adult medical needs: yes  We will partner with other providers in the management of established vascular disease and cardiometabolic risk factors.    Studies to Be Obtained: , Ao/iliac duplex 2027   Labs to Be Obtained: cbc, cmp, lipid, ma prior to next, A1c per DM clinic - reprinted, pt to do prior to PCP appt, will order new at next appt    Follow up in:    6 months     BEVERLEY Everett.R.N.  [facility]   [facility]   [phone]

## 2025-02-07 ENCOUNTER — NON-PROVIDER VISIT (OUTPATIENT)
Dept: VASCULAR LAB | Facility: MEDICAL CENTER | Age: 70
End: 2025-02-07
Attending: INTERNAL MEDICINE
Payer: MEDICARE

## 2025-02-07 DIAGNOSIS — E11.9 TYPE 2 DIABETES MELLITUS WITHOUT COMPLICATION, WITHOUT LONG-TERM CURRENT USE OF INSULIN (HCC): ICD-10-CM

## 2025-02-07 LAB
HBA1C MFR BLD: 5.8 % (ref ?–5.8)
POCT INT CON NEG: NEGATIVE
POCT INT CON POS: POSITIVE

## 2025-02-07 PROCEDURE — 99212 OFFICE O/P EST SF 10 MIN: CPT

## 2025-02-07 PROCEDURE — 83036 HEMOGLOBIN GLYCOSYLATED A1C: CPT

## 2025-02-07 NOTE — PROGRESS NOTES
Patient Consult Note        Primary care physician: Lisa Otero M.D.    Reason for consult: Management of Controlled Type 2 Diabetes    HPI:  Nando Nelson is a 69 y.o. old patient who comes in today for evaluation of above stated problem.    Allergies  Sagebrush allergy skin test    Current Diabetes Medication Regimen  GLP-1 or GLP-1/GIP Agent: semaglutide (Ozempic) 2 mg weekly      Previous Diabetes Medications and Reason for Discontinuation  N/A    Potential Barriers to Care:  Adherence: denies missed doses  Side effects: none  Affordability: no issues at this time - pt on PAP    SMBG  Pt has home glucometer and proper testing technique - yes    Pt reports blood sugars:   Before Breakfast: 100-130 (typically under 120)      Hypoglycemia  Hypoglycemia awareness: Yes  Nocturnal hypoglycemia: None  Hypoglycemia:  None  Pt's treatment of Hypoglycemia  Discussed 15:15 Rule    Lifestyle  Current Exercise - moderate hikes 3 mile hikes 3-4 x week    Dietary - low carb diet  Breakfast - hard boiled egg, cottage cheese, toast ,coffee  Lunch - 1/2 sandwich, salad, fruit  Dinner - veggies, lean meats   Snacks - peanuts  Drinks - water, carbonated water, diet soda    Labs  Lab Results   Component Value Date/Time    HBA1C 5.8 02/07/2025 12:00 AM    HBA1C 5.7 11/01/2024 12:00 AM    HBA1C 5.8 06/21/2024 12:00 AM      Lab Results   Component Value Date/Time    SODIUM 141 10/15/2024 09:51 AM    POTASSIUM 5.3 10/15/2024 09:51 AM    CHLORIDE 104 10/15/2024 09:51 AM    CO2 27 10/15/2024 09:51 AM    GLUCOSE 120 (H) 10/15/2024 09:51 AM    BUN 18 10/15/2024 09:51 AM    CREATININE 1.01 10/15/2024 09:51 AM     Lab Results   Component Value Date/Time    ALKPHOSPHAT 79 02/12/2024 08:13 AM    ASTSGOT 25 02/12/2024 08:13 AM    ALTSGPT 29 02/12/2024 08:13 AM    TBILIRUBIN 0.5 02/12/2024 08:13 AM    INR 1.05 10/15/2024 09:51 AM    ALBUMIN 4.0 02/12/2024 08:13 AM      Lab Results   Component Value Date/Time    CHOLSTRLTOT 121 02/12/2024  08:13 AM    LDL 55 02/12/2024 08:13 AM    HDL 57 02/12/2024 08:13 AM    TRIGLYCERIDE 47 02/12/2024 08:13 AM       Lab Results   Component Value Date/Time    MALBCRT 9 02/12/2024 08:12 AM    MICROALBUR 1.7 02/12/2024 08:12 AM     Preventative Management  BP regimen (ACE/ARB) - Telmisartan 40mg QD  BP < 140/90 - yes  Statin - Rosuvastatin 20mg QD   LDL <100 - yes  Last Retinal Scan - attempted at PCP but referred to optometrist   Last Microalbumin/Cr - due 2/2025  Monofilament - due 3/2025    Physical Examination:  Vital signs: There were no vitals taken for this visit. There is no height or weight on file to calculate BMI.    Assessment and Plan:    1. DM2  Patient presents to clinic today to follow up on DM management   Discussed Goals: FBG 80 - 130, 2hPP < 180, a1c < 7.0%  Last a1c drawn again TODAY (2/7/24) and it was 5.8%, which is at goal and has worsened since the previous reading of 5.7% (11/01/24)  Patient reports that his FBG ranges about 100-130 with most readings below 120 which is also in range  Patient has been on Ozempic 2mg Q7d for several months and has been tolerating it well without issue.   Patient filled out renewal PAP application at previous appointment.     - Medication changes:  NONE   - Continue:  CONTINUE with Ozempic 2mg Q7d     - Lifestyle changes:  Exercise Goal - Continue with current active regimen and increase as tolerable   Dietary Goal - continue to eat low carb intake and healthy choices maximizing lean meats/protein and veggies.    - Preventative management:  REC DM Score: 1  Care gaps addressed:   Retinal exam due: Reminded patient to complete retinal exam  Care gaps updated in Health Maintenance    Follow Up:  6 months    Jona Andino  PharmD      CC:   Lisa Otero M.D.

## 2025-03-28 ENCOUNTER — DOCUMENTATION (OUTPATIENT)
Dept: VASCULAR LAB | Facility: MEDICAL CENTER | Age: 70
End: 2025-03-28
Payer: MEDICARE

## 2025-03-28 NOTE — PROGRESS NOTES
[facility] for [facility]      Called the patient to inform him that his PAP for Ozempic arrived to our clinic today and will be available for  from here M - F 8am -4:30pm (except during noon -1pm closed for lunch).   Patient is appreciative for the call and will be by to  soon.       Jona WalkerD  [facility]  Outpatient Ambulatory Care  [facility]  [phone]

## 2025-04-02 ENCOUNTER — APPOINTMENT (OUTPATIENT)
Dept: MEDICAL GROUP | Facility: PHYSICIAN GROUP | Age: 70
End: 2025-04-02
Payer: MEDICARE

## 2025-04-02 VITALS
WEIGHT: 242 LBS | SYSTOLIC BLOOD PRESSURE: 132 MMHG | TEMPERATURE: 98.6 F | BODY MASS INDEX: 35.84 KG/M2 | HEIGHT: 69 IN | RESPIRATION RATE: 16 BRPM | OXYGEN SATURATION: 95 % | DIASTOLIC BLOOD PRESSURE: 74 MMHG | HEART RATE: 94 BPM

## 2025-04-02 DIAGNOSIS — C67.9 MALIGNANT NEOPLASM OF URINARY BLADDER, UNSPECIFIED SITE (HCC): ICD-10-CM

## 2025-04-02 DIAGNOSIS — J00 ACUTE RHINITIS: ICD-10-CM

## 2025-04-02 DIAGNOSIS — Z12.5 SCREENING FOR PROSTATE CANCER: ICD-10-CM

## 2025-04-02 DIAGNOSIS — E55.9 VITAMIN D DEFICIENCY: ICD-10-CM

## 2025-04-02 PROCEDURE — 3075F SYST BP GE 130 - 139MM HG: CPT | Performed by: FAMILY MEDICINE

## 2025-04-02 PROCEDURE — 3078F DIAST BP <80 MM HG: CPT | Performed by: FAMILY MEDICINE

## 2025-04-02 PROCEDURE — 99214 OFFICE O/P EST MOD 30 MIN: CPT | Performed by: FAMILY MEDICINE

## 2025-04-02 RX ORDER — ERGOCALCIFEROL 1.25 MG/1
50000 CAPSULE, LIQUID FILLED ORAL
Qty: 13 CAPSULE | Refills: 1 | Status: SHIPPED | OUTPATIENT
Start: 2025-04-02

## 2025-04-02 RX ORDER — AZELASTINE HYDROCHLORIDE 137 UG/1
SPRAY, METERED NASAL
COMMUNITY
Start: 2025-01-29

## 2025-04-02 ASSESSMENT — PATIENT HEALTH QUESTIONNAIRE - PHQ9: CLINICAL INTERPRETATION OF PHQ2 SCORE: 0

## 2025-04-02 ASSESSMENT — FIBROSIS 4 INDEX: FIB4 SCORE: 1.5

## 2025-04-02 NOTE — PROGRESS NOTES
Subjective:     CC:   Chief Complaint   Patient presents with    Follow-Up     Head cold started yesterday        HPI:   Nando presents today due to the fact that he has had some nasal congestion and drainage for the last 2 to 3 days he is just blowing clear material from his nose.  Patient also is still seeing urology his bladder cancer had recur he just completed chemotherapy in January.  Patient does have another follow-up with urology coming up.  Patient is seen pharmacotherapy for his diabetes last hemoglobin A1c 5.8 patient appears to be doing well on the Ozempic.  Patient's next pharmacotherapy appointment will be in August.    Past Medical History:   Diagnosis Date    Daytime sleepiness     Gasping for breath     sometimes    History of elevated glucose     RICHARD on CPAP 2022    Seasonal allergies 2022    Malachi    Snoring     DX RICHARD    Urinary bladder disorder     Bladder Cancer       Social History     Tobacco Use    Smoking status: Former     Current packs/day: 0.00     Types: Cigarettes     Quit date:      Years since quittin.2    Smokeless tobacco: Never   Vaping Use    Vaping status: Never Used   Substance Use Topics    Alcohol use: Yes     Comment: Occ    Drug use: Yes     Types: Marijuana       Current Outpatient Medications Ordered in Epic   Medication Sig Dispense Refill    Azelastine (ASTELIN) 137 MCG/SPRAY Solution       vitamin D2, Ergocalciferol, (DRISDOL) 1.25 MG (55511 UT) Cap capsule Take 1 Capsule by mouth every 7 days. 13 Capsule 1    rosuvastatin (CRESTOR) 20 MG Tab Take 1 Tablet by mouth every evening. 100 Tablet 3    Semaglutide, 2 MG/DOSE, (OZEMPIC, 2 MG/DOSE,) 8 MG/3ML Solution Pen-injector Inject 2 mg under the skin every 7 days. 12 mL 3    telmisartan (MICARDIS) 40 MG Tab Take 1 Tablet by mouth every day. to lower blood pressure 90 Tablet 3    aspirin 81 MG EC tablet Take 1 Tablet by mouth every day. 30 Tablet     Multiple Vitamin (MULTIVITAMIN ADULT PO) Take   "by mouth.      Fexofenadine HCl (ALLEGRA ALLERGY PO) Take  by mouth as needed.      B Complex Cap Take  by mouth every day.       No current [facility]-ordered facility-administered medications on file.       Allergies:  S[place]brush allergy skin test    Health Maintenance: Completed    ROS:  Gen: no fevers/chills, no changes in weight  Eyes: no changes in vision  ENT: no sore throat, no hearing loss, no bloody nose  Pulm: no sob, no cough  CV: no chest pain, no palpitations  GI: no nausea/vomiting, no diarrhea  : no dysuria  Neuro: no headaches, no numbness/tingling  Heme/Lymph: no easy bruising    Objective:     Exam:  /74 (BP Location: Right arm, Patient Position: Sitting, BP Cuff Size: Adult)   Pulse 94   Temp 37 °C (98.6 °F) (Temporal)   Resp 16   Ht 1.753 m (5' 9\")   Wt 110 kg (242 lb)   SpO2 95%   BMI 35.74 kg/m²  Body mass index is 35.74 kg/m².    Gen: Alert and oriented, No apparent distress.  Skin: Warm and dry.  No obvious lesions.  No filament exam was done patient able to feel equally in both feet  Eyes: Sclera wnl Pupils normal in size  ENT: Canals wnl and TM are not red, there is clear fluid behind both TMs mouth negative redness or exudates.  No sinus tenderness noted on palpation of maxillary or frontal.  Lungs: Normal effort, CTA bilaterally, no wheezes, rhonchi, or rales  CV: Regular rate and rhythm. No murmurs, rubs, or gallops.  Musculoskeletal: Normal gait. No extremity cyanosis, clubbing, or edema.  Neuro: Oriented to person, place and time  Psych: Mood is wnl       Assessment & Plan:     70 y.o. male with the following -     1. Vitamin D deficiency  Requesting refill of his vitamin D we will need to recheck his vitamin D in the next couple of months patient should follow-up then  - VITAMIN D,25 HYDROXY (DEFICIENCY); Future    2. Screening for prostate cancer  Patient agreeable to get a PSA check will order this  - PROSTATE SPECIFIC AG SCREENING; Future    3. Malignant neoplasm of " urinary bladder, unspecified site (HCC)  Commend ordering a CBC  - CBC WITH DIFFERENTIAL; Future    4. Acute rhinitis  Patient has not been using his nasal spray recommend he start back on it.  Patient has seen a allergist in the past who recommend he take Claritin and Zyrtec 1 in the morning and another in the evening.  I would recommend if he starts blowing yellow-green material or having a cough of yellow-green material please let me know.    Other orders  - Azelastine (ASTELIN) 137 MCG/SPRAY Solution       Return in about 3 months (around 7/2/2025), or if symptoms worsen or fail to improve.    Please note that this dictation was created using voice recognition software. I have made every reasonable attempt to correct obvious errors, but I expect that there are errors of grammar and possibly content that I did not discover before finalizing the note.

## 2025-04-07 DIAGNOSIS — I10 PRIMARY HYPERTENSION: ICD-10-CM

## 2025-04-07 RX ORDER — TELMISARTAN 40 MG/1
40 TABLET ORAL DAILY
Qty: 100 TABLET | Refills: 3 | Status: SHIPPED | OUTPATIENT
Start: 2025-04-07

## 2025-07-02 ENCOUNTER — HOSPITAL ENCOUNTER (OUTPATIENT)
Dept: LAB | Facility: MEDICAL CENTER | Age: 70
End: 2025-07-02
Attending: FAMILY MEDICINE
Payer: MEDICARE

## 2025-07-02 ENCOUNTER — HOSPITAL ENCOUNTER (OUTPATIENT)
Dept: LAB | Facility: MEDICAL CENTER | Age: 70
End: 2025-07-02
Payer: MEDICARE

## 2025-07-02 DIAGNOSIS — E78.49 OTHER HYPERLIPIDEMIA: ICD-10-CM

## 2025-07-02 DIAGNOSIS — I10 PRIMARY HYPERTENSION: ICD-10-CM

## 2025-07-02 DIAGNOSIS — E55.9 VITAMIN D DEFICIENCY: ICD-10-CM

## 2025-07-02 DIAGNOSIS — Z12.5 SCREENING FOR PROSTATE CANCER: ICD-10-CM

## 2025-07-02 DIAGNOSIS — E11.9 TYPE 2 DIABETES MELLITUS WITHOUT COMPLICATION, WITHOUT LONG-TERM CURRENT USE OF INSULIN (HCC): ICD-10-CM

## 2025-07-02 DIAGNOSIS — C67.9 MALIGNANT NEOPLASM OF URINARY BLADDER, UNSPECIFIED SITE (HCC): ICD-10-CM

## 2025-07-02 LAB
25(OH)D3 SERPL-MCNC: 45 NG/ML (ref 30–100)
ALBUMIN SERPL BCP-MCNC: 4.2 G/DL (ref 3.2–4.9)
ALBUMIN/GLOB SERPL: 1.6 G/DL
ALP SERPL-CCNC: 69 U/L (ref 30–99)
ALT SERPL-CCNC: 31 U/L (ref 2–50)
ANION GAP SERPL CALC-SCNC: 10 MMOL/L (ref 7–16)
AST SERPL-CCNC: 20 U/L (ref 12–45)
BASOPHILS # BLD AUTO: 0.9 % (ref 0–1.8)
BASOPHILS # BLD: 0.06 K/UL (ref 0–0.12)
BILIRUB SERPL-MCNC: 0.4 MG/DL (ref 0.1–1.5)
BUN SERPL-MCNC: 23 MG/DL (ref 8–22)
CALCIUM ALBUM COR SERPL-MCNC: 9.5 MG/DL (ref 8.5–10.5)
CALCIUM SERPL-MCNC: 9.7 MG/DL (ref 8.5–10.5)
CHLORIDE SERPL-SCNC: 106 MMOL/L (ref 96–112)
CHOLEST SERPL-MCNC: 150 MG/DL (ref 100–199)
CO2 SERPL-SCNC: 25 MMOL/L (ref 20–33)
CREAT SERPL-MCNC: 1.18 MG/DL (ref 0.5–1.4)
CREAT UR-MCNC: 171 MG/DL
EOSINOPHIL # BLD AUTO: 0.16 K/UL (ref 0–0.51)
EOSINOPHIL NFR BLD: 2.3 % (ref 0–6.9)
ERYTHROCYTE [DISTWIDTH] IN BLOOD BY AUTOMATED COUNT: 47.5 FL (ref 35.9–50)
FASTING STATUS PATIENT QL REPORTED: NORMAL
GFR SERPLBLD CREATININE-BSD FMLA CKD-EPI: 67 ML/MIN/1.73 M 2
GLOBULIN SER CALC-MCNC: 2.7 G/DL (ref 1.9–3.5)
GLUCOSE SERPL-MCNC: 99 MG/DL (ref 65–99)
HCT VFR BLD AUTO: 47.5 % (ref 42–52)
HDLC SERPL-MCNC: 70 MG/DL
HGB BLD-MCNC: 15.3 G/DL (ref 14–18)
IMM GRANULOCYTES # BLD AUTO: 0.02 K/UL (ref 0–0.11)
IMM GRANULOCYTES NFR BLD AUTO: 0.3 % (ref 0–0.9)
LDLC SERPL CALC-MCNC: 67 MG/DL
LYMPHOCYTES # BLD AUTO: 1.56 K/UL (ref 1–4.8)
LYMPHOCYTES NFR BLD: 22.4 % (ref 22–41)
MCH RBC QN AUTO: 29.4 PG (ref 27–33)
MCHC RBC AUTO-ENTMCNC: 32.2 G/DL (ref 32.3–36.5)
MCV RBC AUTO: 91.3 FL (ref 81.4–97.8)
MICROALBUMIN UR-MCNC: 1.6 MG/DL
MICROALBUMIN/CREAT UR: 9 MG/G (ref 0–30)
MONOCYTES # BLD AUTO: 0.56 K/UL (ref 0–0.85)
MONOCYTES NFR BLD AUTO: 8 % (ref 0–13.4)
NEUTROPHILS # BLD AUTO: 4.61 K/UL (ref 1.82–7.42)
NEUTROPHILS NFR BLD: 66.1 % (ref 44–72)
NRBC # BLD AUTO: 0 K/UL
NRBC BLD-RTO: 0 /100 WBC (ref 0–0.2)
PLATELET # BLD AUTO: 200 K/UL (ref 164–446)
PMV BLD AUTO: 9.8 FL (ref 9–12.9)
POTASSIUM SERPL-SCNC: 4.6 MMOL/L (ref 3.6–5.5)
PROT SERPL-MCNC: 6.9 G/DL (ref 6–8.2)
PSA SERPL DL<=0.01 NG/ML-MCNC: 0.46 NG/ML (ref 0–4)
RBC # BLD AUTO: 5.2 M/UL (ref 4.7–6.1)
SODIUM SERPL-SCNC: 141 MMOL/L (ref 135–145)
TRIGL SERPL-MCNC: 63 MG/DL (ref 0–149)
WBC # BLD AUTO: 7 K/UL (ref 4.8–10.8)

## 2025-07-02 PROCEDURE — 82306 VITAMIN D 25 HYDROXY: CPT

## 2025-07-02 PROCEDURE — 82043 UR ALBUMIN QUANTITATIVE: CPT

## 2025-07-02 PROCEDURE — 80053 COMPREHEN METABOLIC PANEL: CPT

## 2025-07-02 PROCEDURE — 84153 ASSAY OF PSA TOTAL: CPT | Mod: GA

## 2025-07-02 PROCEDURE — 36415 COLL VENOUS BLD VENIPUNCTURE: CPT

## 2025-07-02 PROCEDURE — 82570 ASSAY OF URINE CREATININE: CPT

## 2025-07-02 PROCEDURE — 85025 COMPLETE CBC W/AUTO DIFF WBC: CPT

## 2025-07-02 PROCEDURE — 80061 LIPID PANEL: CPT

## 2025-07-09 ENCOUNTER — TELEPHONE (OUTPATIENT)
Dept: VASCULAR LAB | Facility: MEDICAL CENTER | Age: 70
End: 2025-07-09
Payer: MEDICARE

## 2025-07-09 NOTE — TELEPHONE ENCOUNTER
Received pt's Ozempic PAP shipment. Called and notified pt. Instructed to p/u at [facility] anytime Mon-Fri from 8-4, away for lunch 12-1.    Baltazar Holley, AaronD, BCACP

## 2025-07-16 ENCOUNTER — OFFICE VISIT (OUTPATIENT)
Dept: MEDICAL GROUP | Facility: PHYSICIAN GROUP | Age: 70
End: 2025-07-16
Payer: MEDICARE

## 2025-07-16 VITALS
BODY MASS INDEX: 35.29 KG/M2 | DIASTOLIC BLOOD PRESSURE: 60 MMHG | TEMPERATURE: 98.2 F | HEART RATE: 92 BPM | WEIGHT: 238.3 LBS | SYSTOLIC BLOOD PRESSURE: 110 MMHG | OXYGEN SATURATION: 95 % | RESPIRATION RATE: 18 BRPM | HEIGHT: 69 IN

## 2025-07-16 DIAGNOSIS — I10 PRIMARY HYPERTENSION: ICD-10-CM

## 2025-07-16 DIAGNOSIS — E11.9 TYPE 2 DIABETES MELLITUS WITHOUT COMPLICATION, WITHOUT LONG-TERM CURRENT USE OF INSULIN (HCC): Primary | ICD-10-CM

## 2025-07-16 DIAGNOSIS — E78.49 OTHER HYPERLIPIDEMIA: ICD-10-CM

## 2025-07-16 DIAGNOSIS — N32.9 LESION OF URINARY BLADDER: ICD-10-CM

## 2025-07-16 DIAGNOSIS — I71.41 PARARENAL ABDOMINAL AORTIC ANEURYSM (AAA) WITHOUT RUPTURE (HCC): Chronic | ICD-10-CM

## 2025-07-16 DIAGNOSIS — E55.9 VITAMIN D DEFICIENCY: ICD-10-CM

## 2025-07-16 LAB
HBA1C MFR BLD: 5.7 % (ref ?–5.8)
POCT INT CON NEG: NEGATIVE
POCT INT CON POS: POSITIVE

## 2025-07-16 PROCEDURE — 83036 HEMOGLOBIN GLYCOSYLATED A1C: CPT | Performed by: FAMILY MEDICINE

## 2025-07-16 PROCEDURE — 3074F SYST BP LT 130 MM HG: CPT | Performed by: FAMILY MEDICINE

## 2025-07-16 PROCEDURE — 3078F DIAST BP <80 MM HG: CPT | Performed by: FAMILY MEDICINE

## 2025-07-16 PROCEDURE — 99214 OFFICE O/P EST MOD 30 MIN: CPT | Performed by: FAMILY MEDICINE

## 2025-07-16 ASSESSMENT — FIBROSIS 4 INDEX: FIB4 SCORE: 1.24

## 2025-07-16 NOTE — PROGRESS NOTES
"Subjective:     CC:   Chief Complaint   Patient presents with    Follow-Up       HPI:   Nando presents today for follow-up.  Patient does have appointment to see pharmacotherapy at vascular.  At this time they are taking care.  Diabetes also tracking his lipid panel.  Patient is seeing urology due to fact he had a bladder cancer he did have chemotherapy treatment done he is getting a cystoscope at this time every 3 months.  Patient also has appointment scheduled with dermatology for skin check which he normally gets once a year    Past Medical History[1]    Social History[2]    Current Medications and Prescriptions Ordered in Epic[3]    Allergies:  [place] allergy skin test    Health Maintenance: Completed    ROS:  Gen: no fevers/chills, patient has lost 4 pounds in the last 3 months  Eyes: no changes in vision  ENT: no sore throat, no hearing loss, no bloody nose  Pulm: no sob, no cough  CV: no chest pain, no palpitations  GI: no nausea/vomiting, no diarrhea  : no dysuria  Neuro: no headaches, no numbness/tingling  Heme/Lymph: no easy bruising    Objective:     Exam:  /60 (BP Location: Left arm, Patient Position: Sitting, BP Cuff Size: Large adult)   Pulse 92   Temp 36.8 °C (98.2 °F)   Resp 18   Ht 1.753 m (5' 9\")   Wt 108 kg (238 lb 4.8 oz)   SpO2 95%   BMI 35.19 kg/m²  Body mass index is 35.19 kg/m².    Gen: Alert and oriented, No apparent distress.  Skin: Warm and dry.  No obvious lesions.  Eyes: Sclera wnl Pupils normal in size  Lungs: Normal effort, CTA bilaterally, no wheezes, rhonchi, or rales  CV: Regular rate and rhythm. No murmurs, rubs, or gallops.  Musculoskeletal: Normal gait. No extremity cyanosis, clubbing, or edema.  Neuro: Oriented to person, place and time  Psych: Mood is wnl         Assessment & Plan:     70 y.o. male with the following -     1. Vitamin D deficiency  Since vitamin D is in good range patient continue his vitamin D3 supplement    2. Type 2 diabetes mellitus without " complication, without long-term current use of insulin (HCC)  Patient's hemoglobin A1c is 5.7 unable to do his retinal we tried last year and unable to do.  I did discuss with patient the need for him to follow-up with optometry patient will call the various office to find out who we can see.  Due to the fact that he has Medicare.  Patient denies any vision issues  - POCT  A1C  - POCT Retinal Eye Exam    3. Primary hypertension  Patient's blood pressure is at goal    4. 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)  Patient is seeing vascular due to the question of a possible aneurysm but he states studies were done it shows that he does not have 1 patient is following them up for his lipid panel which looks at goal.  Patient also is following up for his diabetes I imagine that they will probably release him back to me for continue monitoring his diabetes    5. Lesion of urinary bladder  Patient to follow-up with urology as planned his PSA that I did is in good range    6. Other hyperlipidemia  Lipid panel looks at goal       Return in about 4 months (around 2025), or if symptoms worsen or fail to improve.    Please note that this dictation was created using voice recognition software. I have made every reasonable attempt to correct obvious errors, but I expect that there are errors of grammar and possibly content that I did not discover before finalizing the note.         [1]   Past Medical History:  Diagnosis Date    Daytime sleepiness     Gasping for breath     sometimes    History of elevated glucose     RICHARD on CPAP 2022    Seasonal allergies 2022    [place]    Snoring     DX RICHARD    Urinary bladder disorder     Bladder Cancer   [2]   Social History  Tobacco Use    Smoking status: Former     Current packs/day: 0.00     Types: Cigarettes     Quit date:      Years since quittin.5    Smokeless tobacco: Never   Vaping Use    Vaping status: Never Used   Substance Use  Topics    Alcohol use: Yes     Comment: Occ    Drug use: Yes     Types: Marijuana   [3]   Current Outpatient Medications Ordered in Epic   Medication Sig Dispense Refill    telmisartan (MICARDIS) 40 MG Tab Take 1 Tablet by mouth every day. to lower blood pressure 100 Tablet 3    Azelastine (ASTELIN) 137 MCG/SPRAY Solution       vitamin D2, Ergocalciferol, (DRISDOL) 1.25 MG (57348 UT) Cap capsule Take 1 Capsule by mouth every 7 days. 13 Capsule 1    rosuvastatin (CRESTOR) 20 MG Tab Take 1 Tablet by mouth every evening. 100 Tablet 3    Semaglutide, 2 MG/DOSE, (OZEMPIC, 2 MG/DOSE,) 8 MG/3ML Solution Pen-injector Inject 2 mg under the skin every 7 days. 12 mL 3    aspirin 81 MG EC tablet Take 1 Tablet by mouth every day. 30 Tablet     Multiple Vitamin (MULTIVITAMIN ADULT PO) Take  by mouth.      Fexofenadine HCl (ALLEGRA ALLERGY PO) Take  by mouth as needed.      B Complex Cap Take  by mouth every day.       No current [facility]-ordered facility-administered medications on file.

## 2025-07-17 NOTE — TELEPHONE ENCOUNTER
Pt presented to [facility] to  PAP Ozempic 2 mg shipment. Verified pt name and . Pt did not have any questions at this time. Will have MA's upload signed packing slip into media tab.     Rivas Lipscomb PharmD  Authorized Nuclear Pharmacist (ANP)

## 2025-07-25 DIAGNOSIS — I10 PRIMARY HYPERTENSION: Primary | ICD-10-CM

## 2025-07-25 NOTE — PROGRESS NOTES
Established patient  Chart prep for upcoming appointment.    Any pending/incomplete orders from last visit? No, all orders completed.  Was patient called and reminded to complete pending orders? N/A orders complete  Were any records requested?  No    Referral up to date? Yes renewal ordered, sent to provider to co-sign, AND new referral attached to upcoming appointment.    Referral attached to appointment (renewals and New patients only)? N/A (established with up-to-date referral)  Virtual appointment? No            Marcie Cortes, Medical Assistant   [facility]   Ph: [phone]  Fx: [phone]

## 2025-07-31 ENCOUNTER — OFFICE VISIT (OUTPATIENT)
Dept: VASCULAR LAB | Facility: MEDICAL CENTER | Age: 70
End: 2025-07-31
Payer: MEDICARE

## 2025-07-31 VITALS
WEIGHT: 241 LBS | DIASTOLIC BLOOD PRESSURE: 75 MMHG | HEART RATE: 82 BPM | SYSTOLIC BLOOD PRESSURE: 129 MMHG | BODY MASS INDEX: 35.7 KG/M2 | HEIGHT: 69 IN

## 2025-07-31 DIAGNOSIS — I70.0 ABDOMINAL AORTIC ATHEROSCLEROSIS (HCC): ICD-10-CM

## 2025-07-31 DIAGNOSIS — E78.49 OTHER HYPERLIPIDEMIA: ICD-10-CM

## 2025-07-31 DIAGNOSIS — I10 PRIMARY HYPERTENSION: ICD-10-CM

## 2025-07-31 DIAGNOSIS — I77.811 ABDOMINAL AORTIC ECTASIA (HCC): ICD-10-CM

## 2025-07-31 DIAGNOSIS — I71.41 PARARENAL ABDOMINAL AORTIC ANEURYSM (AAA) WITHOUT RUPTURE (HCC): Primary | Chronic | ICD-10-CM

## 2025-07-31 DIAGNOSIS — E55.9 VITAMIN D DEFICIENCY: ICD-10-CM

## 2025-07-31 DIAGNOSIS — G47.33 OSA ON CPAP: ICD-10-CM

## 2025-07-31 PROCEDURE — 99214 OFFICE O/P EST MOD 30 MIN: CPT

## 2025-07-31 RX ORDER — EZETIMIBE 10 MG/1
10 TABLET ORAL DAILY
Qty: 100 TABLET | Refills: 3 | Status: SHIPPED | OUTPATIENT
Start: 2025-07-31

## 2025-07-31 RX ORDER — CETIRIZINE HYDROCHLORIDE 10 MG/1
10 TABLET ORAL DAILY
COMMUNITY

## 2025-07-31 ASSESSMENT — ENCOUNTER SYMPTOMS
FEVER: 0
PALPITATIONS: 0
CLAUDICATION: 0
HEADACHES: 0
SPEECH CHANGE: 0
FOCAL WEAKNESS: 0
SHORTNESS OF BREATH: 0
CHILLS: 0
BLOOD IN STOOL: 0
WEAKNESS: 0
ABDOMINAL PAIN: 0
DIZZINESS: 0
HEMOPTYSIS: 0
MYALGIAS: 0
BRUISES/BLEEDS EASILY: 0
COUGH: 0

## 2025-07-31 ASSESSMENT — FIBROSIS 4 INDEX: FIB4 SCORE: 1.24

## 2025-07-31 NOTE — PROGRESS NOTES
FOLLOW-UP VASCULAR MEDICINE VISIT  2025    Nando Nelson is a 71 yo male  who presents for f/u   initially referred by Lisa Otero M.D. for eval and med mgmt of AAA, est 2024     Subjective       Interval hx/concerns:   Last seen 2025,  Doing well  On a 3 month surveillance cystoscopy for bladder cancer.  Fortunately has been clear since last surgery and chemo round last year.    Otherwise doing well, no concerns  Continues to follow with DM clinic - has upcoming appt   On ozempic and tolerating  Weight stable   Decreased portions - watching sugars/carbs - but could be better   going to gym 2-3x/week, also walking and hiking [place]  Did fasting  labs - reviewed        Abdominal aortic dilation (prior thought to be aneurysm):  Current CTA imaging shows 2.8cm, down from prior duplex showing 3.4cm.  no new sx.   Pertinent pmhx:  Initial sizing: 3.4cm on duplex 2023   Prior interventions: none   Prior ASCVD events: none   Fam hx of aneurysmal disease: no   reports that he quit smoking about 35 years ago. His smoking use included cigarettes. He has never used smokeless tobacco.     HTN: Stable,   tolerating telmisartan,   good adherence,   Home BP lo-120s/70s - good record keeping  Denies dizziness, lightheadedness, no HA or vision changes  Occasional dizziness with rapid position changes  No CP, SOB, palpitations, or leg swelling  Watching salt in diet - little more lax recently    Hyperlipidemia:   Stable,   Current treatment: rosuvastatin 20mg - tolerating     Antiplatelet/anticoagulation:   No bleeding on ASA        Current Outpatient Medications:     cetirizine, 10 mg, Oral, DAILY, Taking    melatonin, 5 mg, Oral, Nightly, Taking    ezetimibe, 10 mg, Oral, DAILY, Taking    telmisartan, 40 mg, Oral, DAILY, Taking    Azelastine, , Taking    vitamin D2, 50,000 Units, Oral, Q7 DAYS, Taking    rosuvastatin, 20 mg, Oral, Q EVENING, Taking    Ozempic (2 MG/DOSE), 2 mg, Subcutaneous, Q7 DAYS,  "Taking    aspirin, 81 mg, Oral, DAILY, Taking    Multiple Vitamin (MULTIVITAMIN ADULT PO), Take  by mouth., Taking    B Complex, Take  by mouth every day., Taking      Social History     Tobacco Use    Smoking status: Former     Current packs/day: 0.00     Types: Cigarettes     Quit date:      Years since quittin.6    Smokeless tobacco: Never   Vaping Use    Vaping status: Never Used   Substance Use Topics    Alcohol use: Yes     Comment: Occ    Drug use: Yes     Types: Marijuana     Review of Systems   Constitutional:  Negative for chills, fever and malaise/fatigue.   HENT:  Negative for nosebleeds.    Respiratory:  Negative for cough, hemoptysis and shortness of breath.    Cardiovascular:  Negative for chest pain, palpitations, claudication and leg swelling.   Gastrointestinal:  Negative for abdominal pain and blood in stool.   Genitourinary:  Negative for hematuria.   Musculoskeletal:  Negative for myalgias.   Neurological:  Negative for dizziness, speech change, focal weakness, weakness and headaches.   Endo/Heme/Allergies:  Does not bruise/bleed easily.       Objective   Vitals:    25 1027   BP: 129/75   BP Location: Left arm   Patient Position: Sitting   BP Cuff Size: Large adult   Pulse: 82   Weight: 109 kg (241 lb)   Height: 1.753 m (5' 9\")      BP Readings from Last 10 Encounters:   25 129/75   25 110/60   25 132/74   25 121/78   24 133/78   24 118/66   24 128/74   24 128/77   24 130/77   24 118/69      Body mass index is 35.59 kg/m².  Wt Readings from Last 3 Encounters:   25 109 kg (241 lb)   25 108 kg (238 lb 4.8 oz)   25 110 kg (242 lb)      Physical Exam  Vitals reviewed.   Constitutional:       General: He is not in acute distress.     Appearance: He is well-developed. He is not diaphoretic.   HENT:      Head: Normocephalic and atraumatic.   Neck:      Thyroid: No thyromegaly.      Vascular: No carotid bruit or " "JVD.   Cardiovascular:      Rate and Rhythm: Normal rate and regular rhythm.      Pulses: Normal pulses.      Heart sounds: No murmur heard.  Pulmonary:      Effort: No respiratory distress.      Breath sounds: Normal breath sounds. No wheezing or rales.   Musculoskeletal:         General: No swelling or tenderness.      Right lower leg: No edema.      Left lower leg: No edema.   Skin:     General: Skin is warm and dry.      Coloration: Skin is not pale.   Neurological:      General: No focal deficit present.      Mental Status: He is oriented to person, place, and time.      Coordination: Coordination normal.      Gait: Gait normal.   Psychiatric:         Mood and Affect: Mood normal.         Behavior: Behavior normal.       Lab Results   Component Value Date    CHOLSTRLTOT 150 07/02/2025    LDL 67 07/02/2025    HDL 70 07/02/2025    TRIGLYCERIDE 63 07/02/2025      Lab Results   Component Value Date/Time    LIPOPROTA <6 02/12/2024 08:13 AM      No results found for: \"APOB\"    Lab Results   Component Value Date    PROTHROMBTM 13.7 10/15/2024    INR 1.05 10/15/2024       Lab Results   Component Value Date    HBA1C 5.7 07/16/2025      Lab Results   Component Value Date    SODIUM 141 07/02/2025    POTASSIUM 4.6 07/02/2025    CHLORIDE 106 07/02/2025    CO2 25 07/02/2025    GLUCOSE 99 07/02/2025    BUN 23 (H) 07/02/2025    CREATININE 1.18 07/02/2025        Lab Results   Component Value Date    WBC 7.0 07/02/2025    RBC 5.20 07/02/2025    HEMOGLOBIN 15.3 07/02/2025    HEMATOCRIT 47.5 07/02/2025    MCV 91.3 07/02/2025    MCH 29.4 07/02/2025    MCHC 32.2 (L) 07/02/2025    MPV 9.8 07/02/2025     Lab Results   Component Value Date    HBA1C 5.7 07/16/2025      Lab Results   Component Value Date/Time    MALBCRT 9 07/02/2025 07:59 AM    MICROALBUR 1.6 07/02/2025 07:59 AM      VASCULAR IMAGING:    AAA screening duplex 8/2023   Mild prominence of the proximal abdominal aorta, measuring up to 3.4 cm.     CTA abd 3/1/24  Vascular " findings: Abdominal aorta is nonaneurysmal, measuring 2.8 cm maximally. No dissection. Renal, mesenteric, and iliac arteries are patent. Mild scattered plaque throughout these vessels.   Adrenal glands: Normal   1. Abdominal aorta is nonaneurysmal, measuring 2.8 cm in diameter. This represents a more accurate measurement when compared with the previous ultrasound.  2. Tiny liver cyst and hepatic steatosis are noted incidentally.    ABPM 4/2024  Mean daytime: 125 or 69 consistent with well-controlled out of office blood pressure     Nocturnal dip: Well-maintained       Medical Decision Making:  Today's Assessment / Status / Plan:     1. 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)        2. RICHARD on CPAP        3. Other hyperlipidemia  ezetimibe (ZETIA) 10 MG Tab    Lipid Profile    APOLIPOPROTEIN B    Comp Metabolic Panel      4. Abdominal aortic atherosclerosis (HCC)        5. Abdominal aortic ectasia (HCC)        6. Vitamin D deficiency        7. Primary hypertension  Comp Metabolic Panel                Patient Type: Secondary Prevention    Etiology of Established CVD if Present:     1) mild abd aortic dilation, non-aneurysmal - asymptomatic   8/2023 duplex - initial sizing = 3.4cm, considered small   3/2024 CTA = 2.8cm indicating minimal dilation only, not aneurysmal  - as reviewed duplex scanning can be less accurate and CTA is true measure modality   - AAA when 3.0+cm   - at initial visit, reviewed pathophys and gave informational handouts   Plan:  - continue secondary prevention med mgmt including antiplatelet therapy  - consider repeat Ao/iliac duplex vs CTA in 3yrs (2027)    BLOOD PRESSURE CONTROL   Office BP Goal ACC/AHA (2017) goal <130/80  Home BP at goal:  yes  Office BP at goal:  yes  24h ABPM:  4/2024 125/69 daytime average  RDN candidate? NO  Contributing factors: n/a   LVH on prior EKG   Plan:   Monitoring:   - start/continue home BP monitoring, reviewed correct technique, provide BP  log and instructions  - order 24h ABPM:  yes  - monitor lytes/gfr routinely - order at next appt  Medications:  - continue telmisartan to 40mg daily    LIPID MANAGEMENT:   Qualifies for Statin Therapy Based on 2018 ACC/AHA Guidelines: yes, Secondary Prevention - Very high risk ASCVD - 2 major events or 1 event with other high-risk conditions  The 10-year ASCVD risk score (Jacques MERCER, et al., 2019) is: 26.3%  Major ASCVD events: Aortic aneurysm due to atherosclerosis   High-risk condition: >64yr old  and Hypertension   Currently on Statin: Started at this visit  Tx goals: LDL-C <55   At goal? No, 7/2025  Long discussion today on goals and treatment options.  Through shared decision making pt will start zetia and refocus back on diet.    Plan:   - reinforced ongoing TLC measures as noted - refocus   - monitor labs prior to next appt   Meds:   - continue rosuvastatin 20mg daily   - START zetia nightly      ANTITHROMBOTIC/ANTIPLATELET THERAPY: continue ASA 81mg daily     GLYCEMIC STATUS: Diabetic  Goal A1c < 7.0  Lab Results   Component Value Date    HBA1C 5.7 07/16/2025      Lab Results   Component Value Date/Time    MALBCRT 9 07/02/2025 07:59 AM    MICROALBUR 1.6 07/02/2025 07:59 AM   Excellent control on most recent labs  Follows with DM clinic  Plan:  - continue current medication plan per DM clinic  -A1c per DM clinic  - recommmend for routine care with PCP (or endocrine) to include regular A1c monitoring, annual albumin/creatinine ratio (ACR), annual diabetic retinopathy screening, foot exams, annual flu vaccine, and updates to pneumonia vaccines as appropriate       LIFESTYLE INTERVENTIONS:    SMOKING: Stages of Change: Maintenance (sustained change >6mo)     reports that he quit smoking about 35 years ago. His smoking use included cigarettes. He has never used smokeless tobacco.   - continued complete avoidance of all tobacco products     PHYSICAL ACTIVITY: continue healthy activity to improve CV fitness.  In  general, targeting >150min/week of moderate-level activity or as much as tolerated in light of functional status and co-morbidities   - avoid excessive lifting, limit to body weight, moderate intensity    WEIGHT MANAGEMENT AND NUTRITION: DASH style dietary approach , Focus on reduced sodium to <2,000mg per day , and Provide specific dietary approach handouts      OTHER: none     Instructed to follow-up with PCP for remainder of adult medical needs: yes  We will partner with other providers in the management of established vascular disease and cardiometabolic risk factors.    Studies to Be Obtained: , Ao/iliac duplex 2027 - not ordered  Labs to Be Obtained:, cmp, lipid, apob prior to next, A1c per DM clinic    Follow up in:    6 months     BEVERLEY Everett.R.N.  [facility]   [facility]   [phone]

## 2025-08-06 ENCOUNTER — APPOINTMENT (OUTPATIENT)
Dept: URBAN - METROPOLITAN AREA CLINIC 6 | Facility: CLINIC | Age: 70
Setting detail: DERMATOLOGY
End: 2025-08-06

## 2025-08-06 DIAGNOSIS — L57.0 ACTINIC KERATOSIS: ICD-10-CM

## 2025-08-06 DIAGNOSIS — L72.0 EPIDERMAL CYST: ICD-10-CM

## 2025-08-06 DIAGNOSIS — L82.1 OTHER SEBORRHEIC KERATOSIS: ICD-10-CM

## 2025-08-06 DIAGNOSIS — L73.8 OTHER SPECIFIED FOLLICULAR DISORDERS: ICD-10-CM

## 2025-08-06 PROCEDURE — ? COUNSELING

## 2025-08-06 PROCEDURE — ? LIQUID NITROGEN

## 2025-08-06 PROCEDURE — ? ADDITIONAL NOTES

## 2025-08-06 ASSESSMENT — LOCATION DETAILED DESCRIPTION DERM
LOCATION DETAILED: LEFT VENTRAL DISTAL FOREARM
LOCATION DETAILED: RIGHT MEDIAL ZYGOMA
LOCATION DETAILED: LEFT INFERIOR CENTRAL MALAR CHEEK
LOCATION DETAILED: LEFT CENTRAL MALAR CHEEK
LOCATION DETAILED: LEFT MEDIAL FOREHEAD
LOCATION DETAILED: LEFT CENTRAL ZYGOMA
LOCATION DETAILED: RIGHT LATERAL MALAR CHEEK
LOCATION DETAILED: LEFT MEDIAL FRONTAL SCALP
LOCATION DETAILED: LEFT SUPERIOR POSTERIOR HELIX
LOCATION DETAILED: RIGHT PROXIMAL DORSAL FOREARM
LOCATION DETAILED: RIGHT MEDIAL FOREHEAD

## 2025-08-06 ASSESSMENT — LOCATION SIMPLE DESCRIPTION DERM
LOCATION SIMPLE: RIGHT ZYGOMA
LOCATION SIMPLE: RIGHT FOREHEAD
LOCATION SIMPLE: LEFT CHEEK
LOCATION SIMPLE: LEFT ZYGOMA
LOCATION SIMPLE: LEFT FOREHEAD
LOCATION SIMPLE: RIGHT FOREARM
LOCATION SIMPLE: RIGHT CHEEK
LOCATION SIMPLE: LEFT EAR
LOCATION SIMPLE: LEFT FOREARM
LOCATION SIMPLE: LEFT SCALP

## 2025-08-06 ASSESSMENT — LOCATION ZONE DERM
LOCATION ZONE: FACE
LOCATION ZONE: SCALP
LOCATION ZONE: EAR
LOCATION ZONE: ARM

## 2025-08-07 ENCOUNTER — OFFICE VISIT (OUTPATIENT)
Dept: VASCULAR LAB | Facility: MEDICAL CENTER | Age: 70
End: 2025-08-07
Attending: INTERNAL MEDICINE
Payer: MEDICARE

## 2025-08-07 DIAGNOSIS — E11.9 TYPE 2 DIABETES MELLITUS WITHOUT COMPLICATION, WITHOUT LONG-TERM CURRENT USE OF INSULIN (HCC): Primary | ICD-10-CM

## 2025-08-07 PROCEDURE — 99213 OFFICE O/P EST LOW 20 MIN: CPT

## (undated) DEVICE — SENSOR OXIMETER ADULT SPO2 RD SET (20EA/BX)

## (undated) DEVICE — BOVIE  BLADE 6 EXTENDED - (50/PK)

## (undated) DEVICE — CANISTER SUCTION 3000ML MECHANICAL FILTER AUTO SHUTOFF MEDI-VAC NONSTERILE LF DISP  (40EA/CA)

## (undated) DEVICE — SYRINGE DISP. 60 CC LL - (30/BX, 12BX/CA)**WHEN THESE ARE GONE ORDER #500206**

## (undated) DEVICE — GOWN WARMING STANDARD FLEX - (30/CA)

## (undated) DEVICE — ARMREST CRADLE FOAM - (2PR/PK 12PR/CA)

## (undated) DEVICE — DRAPE LAPAROTOMY T SHEET - (12EA/CA)

## (undated) DEVICE — PREP FAST FLOSEAL 5ML (6EA/CA)

## (undated) DEVICE — CHLORAPREP 26 ML APPLICATOR - ORANGE TINT(25/CA)

## (undated) DEVICE — TOOL MR8 14CM MATCH HD SYM-TRI 3MM DIAMETER (1/EA)

## (undated) DEVICE — NEEDLE 20 GA X 1 INCH - NON SAFTEY (100/BX)

## (undated) DEVICE — CLOSURE SKIN STRIP 1/2 X 4 IN - (STERI STRIP) (50/BX 4BX/CA)

## (undated) DEVICE — PACK NEURO - (2EA/CA)

## (undated) DEVICE — TUBING C&T SET FLYING LEADS DRAIN TUBING (10EA/BX)

## (undated) DEVICE — HEADREST PRONEVIEW LARGE - (10/CA)

## (undated) DEVICE — SLEEVE VASO CALF MED - (10PR/CA)

## (undated) DEVICE — LIGHT SOURCE MIS 12FT

## (undated) DEVICE — PACK JACKSON TABLE KIT W/OUT - HR (6EA/CA)

## (undated) DEVICE — SET LEADWIRE 5 LEAD BEDSIDE DISPOSABLE ECG (1SET OF 5/EA)

## (undated) DEVICE — SET EXTENSION WITH 2 PORTS (48EA/CA) ***PART #2C8610 IS A SUBSTITUTE*****

## (undated) DEVICE — SODIUM CHL IRRIGATION 0.9% 1000ML (12EA/CA)

## (undated) DEVICE — DRAPE STRLE REG TOWEL 18X24 - (10/BX 4BX/CA)"

## (undated) DEVICE — SUTURE GENERAL

## (undated) DEVICE — BLADE SURGICAL CLIPPER - (50EA/CA)

## (undated) DEVICE — KIT HEMOSTATIC GELATIN MATRIX FLOSEAL NT 5ML (6EA/CA)

## (undated) DEVICE — LACTATED RINGERS INJ 1000 ML - (14EA/CA 60CA/PF)

## (undated) DEVICE — SET EPIDURAL BURRON NON-SAFETY (12EA/CA)

## (undated) DEVICE — MIDAS LUBRICATOR DIFFUSER PACK (4EA/CA)

## (undated) DEVICE — SUCTION INSTRUMENT YANKAUER BULBOUS TIP W/O VENT (50EA/CA)

## (undated) DEVICE — GLOVE PROTEXIS W/NEU-THERA SZ 8.5 (50PR/BX)

## (undated) DEVICE — SUTURE 1 VICRYL PLUS CT-1 - 18 INCH (12/BX)

## (undated) DEVICE — TUBING CLEARLINK DUO-VENT - C-FLO (48EA/CA)

## (undated) DEVICE — ELECTRODE DUAL RETURN W/ CORD - (50/PK)

## (undated) DEVICE — INTRAOP NEURO IN OR 1:1 PER 15 MIN

## (undated) DEVICE — DRAPE MICROSCOPE ARMATEC 120IN X 46IN (10EA/CA)

## (undated) DEVICE — SUTURE 2-0 VICRYL PLUS CT-1 - 8 X 18 INCH(12/BX)

## (undated) DEVICE — DRAPE SURG STERI-DRAPE 7X11OD - (40EA/CA)

## (undated) DEVICE — SYRINGE NON SAFETY 10 CC 20 GA X 1-1/2 IN (100/BX 4BX/CA)

## (undated) DEVICE — LACTATED RINGERS INJ. 500 ML - (24EA/CA)

## (undated) DEVICE — SPONGE GAUZESTER 4 X 4 4PLY - (128PK/CA)